# Patient Record
Sex: MALE | Race: WHITE | NOT HISPANIC OR LATINO | Employment: FULL TIME | URBAN - METROPOLITAN AREA
[De-identification: names, ages, dates, MRNs, and addresses within clinical notes are randomized per-mention and may not be internally consistent; named-entity substitution may affect disease eponyms.]

---

## 2017-04-25 ENCOUNTER — TRANSFERRED RECORDS (OUTPATIENT)
Dept: HEALTH INFORMATION MANAGEMENT | Facility: CLINIC | Age: 62
End: 2017-04-25

## 2017-05-30 ENCOUNTER — THERAPY VISIT (OUTPATIENT)
Dept: PHYSICAL THERAPY | Facility: CLINIC | Age: 62
End: 2017-05-30
Payer: COMMERCIAL

## 2017-05-30 DIAGNOSIS — M54.2 CERVICALGIA: Primary | ICD-10-CM

## 2017-05-30 PROCEDURE — 97140 MANUAL THERAPY 1/> REGIONS: CPT | Mod: GP | Performed by: PHYSICAL THERAPIST

## 2017-05-30 PROCEDURE — 97112 NEUROMUSCULAR REEDUCATION: CPT | Mod: GP | Performed by: PHYSICAL THERAPIST

## 2017-05-30 PROCEDURE — 97161 PT EVAL LOW COMPLEX 20 MIN: CPT | Mod: GP | Performed by: PHYSICAL THERAPIST

## 2017-05-30 NOTE — LETTER
[facility] ATHLETIC [facility] PHYSICAL THERAPY  [address]  [facility] [address]  [phone]    May 30, 2017    Re: Rasheed Zimmerman   :   1955  MRN:  [id number]   REFERRING PHYSICIAN:   Alvrao Santos    [facility] ATHLETIC [facility] PHYSICAL [facility]  Date of Initial Evaluation:  17  Visits:  Rxs Used: 1  Reason for Referral:  Cervicalgia    EVALUATION SUMMARY    Subjective:  Patient is a 61 year old male presenting with rehab cervical spine hpi. The history is provided by the patient. No  was used.   Rasheed Zimmerman is a 61 year old male with a cervical spine condition.  Condition occurred with:  Degenerative joint disease.  Condition occurred: for unknown reasons.  This is a chronic condition  Pt c/o neck pain of chronic nature (years) with recent flare.  States ordered PT after x-rays showed DDD 17.   Patient reports pain:  Cervical right side, cervical left side, central cervical spine, upper cervical spine, mid cervical spine and lower cervical spine.  Pain is described as shooting, stabbing and aching and is constant   Associated symptoms:  Headache and loss of motion/stiffness. Pain is the same all the time and relieved by rest, NSAID's and muscle relaxants.  Since onset symptoms are unchanged.  General health as reported by patient is good.  Pertinent medical history includes:  Osteoarthritis.  Medical allergies: no.  Other surgeries include:  Orthopedic surgery (R TKA).  Current medications:  Anti-inflammatory.  Current occupation is .  Primary job tasks include:  Prolonged standing, operating a machine and lifting.    Objective:  Cervical/Thoracic Evaluation  Arom wnl cervical: retraction mod loss +/-, rep NE during, increased ROM after.     AROM Cervical:  Flexion:            Min loss +/-  Extension:       Mod loss +/-  Rotation:         Left: min loss +/-     Right: min loss +/-  Side Bend:       Left: mod loss +/-     Right:  Mod/max loss +/-  Headaches: cervical  Cervical Myotomes:  normal  Cervical Dermatomes:  normal              Rasheed Zimmerman         Cervical Palpation:    Tenderness present at Left:    Upper Trap; Levator; Erector Spinae and Suboccipitals  Tenderness present at Right:    Upper Trap; Levator; Erector Spinae and Suboccipitals  Functional Tests:    Core strength and proprioception:  Fair scap stab-UT substitution        Assessment/Plan:    Patient is a 61 year old male with cervical complaints.    Patient has the following significant findings with corresponding treatment plan.                Diagnosis 1:  Neck pain  Pain -  hot/cold therapy, manual therapy, directional preference exercise and home program  Decreased ROM/flexibility - manual therapy and therapeutic exercise  Decreased joint mobility - manual therapy and therapeutic exercise  Decreased strength - therapeutic exercise and therapeutic activities  Impaired muscle performance - neuro re-education  Decreased function - therapeutic activities  Impaired posture - neuro re-education    Therapy Evaluation Codes:   1) History comprised of:   Personal factors that impact the plan of care:      None.    Comorbidity factors that impact the plan of care are:      Asthma and Osteoarthritis.     Medications impacting care: Anti-inflammatory.  2) Examination of Body Systems comprised of:   Body structures and functions that impact the plan of care:      Cervical spine.   Activity limitations that impact the plan of care are:      Driving, Lifting, Reading/Computer work, Working and Sleeping.  3) Clinical presentation characteristics are:   Stable/Uncomplicated.  4) Decision-Making    Low complexity using standardized patient assessment instrument and/or measureable assessment of functional outcome.  Cumulative Therapy Evaluation is: Low complexity.  Previous and current functional limitations:  (See Goal Flow Sheet for this  information)    Short term and Long term goals: (See Goal Flow Sheet for this information)   Communication ability:  Patient appears to be able to clearly communicate and understand verbal and written communication and follow directions correctly.  Treatment Explanation - The following has been discussed with the patient:   RX ordered/plan of care  Anticipated outcomes  Possible risks and side effects      Rasheed Zimmerman         This patient would benefit from PT intervention to resume normal activities.   Rehab potential is excellent.  Frequency:  1 X week, once daily  Duration:  for 8 weeks  Discharge Plan:  Achieve all LTG.  Independent in home treatment program.  Reach maximal therapeutic benefit.    Please refer to the daily flowsheet for treatment today, total treatment time and time spent performing 1:1 timed codes.     Thank you for your referral.    INQUIRIES  Therapist: Liberty Kirby, PT  [facility] PHYSICAL THERAP[facility]  [address] [place] [address]  Phone: [phone]  Fax: [phone]

## 2017-05-30 NOTE — PROGRESS NOTES
Subjective:    Patient is a 61 year old male presenting with rehab left ankle/foot hpi.                                      Pertinent medical history includes:  Osteoarthritis and asthma.    Other surgeries include:  Orthopedic surgery.  Current medications:  Other.  Current occupation is .    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                                Objective:    System    Physical Exam    General     ROS    Assessment/Plan:

## 2017-05-30 NOTE — PROGRESS NOTES
Subjective:    Patient is a 61 year old male presenting with rehab cervical spine hpi. The history is provided by the patient. No  was used.   Rasheed Zimmerman is a 61 year old male with a cervical spine condition.  Condition occurred with:  Degenerative joint disease.  Condition occurred: for unknown reasons.  This is a chronic condition  Pt c/o neck pain of chronic nature (years) with recent flare.  States ordered PT after x-rays showed DDD 4/25/17..    Patient reports pain:  Cervical right side, cervical left side, central cervical spine, upper cervical spine, mid cervical spine and lower cervical spine.    Pain is described as shooting, stabbing and aching and is constant   Associated symptoms:  Headache and loss of motion/stiffness. Pain is the same all the time.   and relieved by rest, NSAID's and muscle relaxants.  Since onset symptoms are unchanged.        General health as reported by patient is good.  Pertinent medical history includes:  Osteoarthritis.  Medical allergies: no.  Other surgeries include:  Orthopedic surgery (R TKA).  Current medications:  Anti-inflammatory.  Current occupation is   .    Primary job tasks include:  Prolonged standing, operating a machine and lifting.                                Objective:    System              Cervical/Thoracic Evaluation  Arom wnl cervical: retraction mod loss +/-, rep NE during, increased ROM after.     AROM:  AROM Cervical:    Flexion:            Min loss +/-  Extension:       Mod loss +/-  Rotation:         Left: min loss +/-     Right: min loss +/-  Side Bend:      Left: mod loss +/-     Right:  Mod/max loss +/-      Headaches: cervical  Cervical Myotomes:  normal                      Cervical Dermatomes:  normal                    Cervical Palpation:    Tenderness present at Left:    Upper Trap; Levator; Erector Spinae and Suboccipitals  Tenderness present at Right:    Upper Trap; Levator; Erector Spinae and  Suboccipitals  Functional Tests:    Core strength and proprioception:  Fair scap stab-UT substitution                                                General     ROS    Assessment/Plan:      Patient is a 61 year old male with cervical complaints.    Patient has the following significant findings with corresponding treatment plan.                Diagnosis 1:  Neck pain  Pain -  hot/cold therapy, manual therapy, directional preference exercise and home program  Decreased ROM/flexibility - manual therapy and therapeutic exercise  Decreased joint mobility - manual therapy and therapeutic exercise  Decreased strength - therapeutic exercise and therapeutic activities  Impaired muscle performance - neuro re-education  Decreased function - therapeutic activities  Impaired posture - neuro re-education    Therapy Evaluation Codes:   1) History comprised of:   Personal factors that impact the plan of care:      None.    Comorbidity factors that impact the plan of care are:      Asthma and Osteoarthritis.     Medications impacting care: Anti-inflammatory.  2) Examination of Body Systems comprised of:   Body structures and functions that impact the plan of care:      Cervical spine.   Activity limitations that impact the plan of care are:      Driving, Lifting, Reading/Computer work, Working and Sleeping.  3) Clinical presentation characteristics are:   Stable/Uncomplicated.  4) Decision-Making    Low complexity using standardized patient assessment instrument and/or measureable assessment of functional outcome.  Cumulative Therapy Evaluation is: Low complexity.    Previous and current functional limitations:  (See Goal Flow Sheet for this information)    Short term and Long term goals: (See Goal Flow Sheet for this information)     Communication ability:  Patient appears to be able to clearly communicate and understand verbal and written communication and follow directions correctly.  Treatment Explanation - The following has  been discussed with the patient:   RX ordered/plan of care  Anticipated outcomes  Possible risks and side effects  This patient would benefit from PT intervention to resume normal activities.   Rehab potential is excellent.    Frequency:  1 X week, once daily  Duration:  for 8 weeks  Discharge Plan:  Achieve all LTG.  Independent in home treatment program.  Reach maximal therapeutic benefit.    Please refer to the daily flowsheet for treatment today, total treatment time and time spent performing 1:1 timed codes.

## 2017-05-30 NOTE — MR AVS SNAPSHOT
After Visit Summary   5/30/2017    Rasheed Zimmerman    MRN: [id number]           Patient Information     Date Of Birth          1955        Visit Information        Provider Department      5/30/2017 3:00 PM Leif Kirby, PT [facility] Physical Therapy        Today's Diagnoses     Cervicalgia    -  1       Follow-ups after your visit        Your next 10 appointments already scheduled     Jun 05, 2017  4:10 PM CDT   NATHAN Spine with Leif Kirby PT   [facility] Physical Therapy ([facility])    [address]  [facility] [address]   [phone]            Jun 13, 2017  3:00 PM CDT   NATHAN Spine with Leif Kirby PT   [facility] Physical Therapy ([facility])    [address]  [facility] [address]   [phone]            Jun 19, 2017  3:40 PM CDT   NATHAN Spine with Alvaro Blackwell PT   [facility] Physical Therapy ([facility])    [address]  [facility] [address]   [phone]            Jun 26, 2017  3:30 PM CDT   NATHAN Spine with Leif Kirby PT   [facility] Physical Therapy ([facility])    [address]  [facility] [address]   [phone]              Who to contact     If you have questions or need follow up information about today's clinic visit or your schedule please contact [facility] ATHLETIC [facility] PHYSICAL THERAPY directly at [phone].  Normal or non-critical lab and imaging results will be communicated to you by MyChart, letter or phone within 4 business days after the clinic has received the results. If you do not hear from us within 7 days, please contact the clinic through MyChart or phone. If you have a critical or abnormal lab result, we will notify you by phone as soon as  possible.  Submit refill requests through [employer] or call your pharmacy and they will forward the refill request to us. Please allow 3 business days for your refill to be completed.          Additional Information About Your Visit        [employer] Information     [employer] gives you secure access to your electronic health record. If you see a primary care provider, you can also send messages to your care team and make appointments. If you have questions, please call your primary care clinic.  If you do not have a primary care provider, please call [phone] and they will assist you.        Care EveryWhere ID     This is your Care EveryWhere ID. This could be used by other organizations to access your [facility] medical records  [id number]         Blood Pressure from Last 3 Encounters:   08/03/16 124/84   05/06/16 138/86   01/30/16 120/80    Weight from Last 3 Encounters:   08/03/16 102.5 kg (226 lb)   05/06/16 98.4 kg (217 lb)   01/30/16 99.8 kg (220 lb)              We Performed the Following     HC PT EVAL, LOW COMPLEXITY     NATHAN INITIAL EVAL REPORT     MANUAL THER TECH,1+REGIONS,EA 15 MIN     NEUROMUSCULAR RE-EDUCATION        Primary Care Provider Office Phone # Fax #    Alvaro Fisher [phone]       [address] [address]        Thank you!     Thank you for choosing [facility] PHYSICAL THERAPY  for your care. Our goal is always to provide you with excellent care. Hearing back from our patients is one way we can continue to improve our services. Please take a few minutes to complete the written survey that you may receive in the mail after your visit with us. Thank you!             Your Updated Medication List - Protect others around you: Learn how to safely use, store and throw away your medicines at www.disposemymeds.org.          This list is accurate as of: 5/30/17  3:49 PM.  Always use your most  recent med list.                   Brand Name Dispense Instructions for use    * albuterol 108 (90 BASE) MCG/ACT Inhaler    PROAIR HFA/PROVENTIL HFA/VENTOLIN HFA     Inhale 2 puffs into the lungs every 6 hours       * albuterol (2.5 MG/3ML) 0.083% neb solution     1 vial    Take 1 vial (2.5 mg) by nebulization every 6 hours as needed for shortness of breath / dyspnea or wheezing Take one vial (2.5mg) by Nebulization once for 1 dose -       BREO ELLIPTA 200-25 MCG/INH oral inhaler   Generic drug:  fluticasone-vilanterol          doxycycline 100 MG capsule    VIBRAMYCIN    20 capsule    Take 1 capsule (100 mg) by mouth 2 times daily       flurbiprofen 100 MG tablet    ANSAID         IBU-200 PO          MONOKET PO          NASONEX 50 MCG/ACT spray   Generic drug:  mometasone     1    1 puff each nostril twice daily       simvastatin 20 MG tablet    ZOCOR    90 tablet    Take 1 tablet (20 mg) by mouth At Bedtime Due for fasting physical 08/2016       SINGULAIR 10 MG tablet   Generic drug:  montelukast     90    ONE TABLET DAILY       VITAMIN D PO      as directed       * Notice:  This list has 2 medication(s) that are the same as other medications prescribed for you. Read the directions carefully, and ask your doctor or other care provider to review them with you.

## 2017-06-15 ENCOUNTER — THERAPY VISIT (OUTPATIENT)
Dept: PHYSICAL THERAPY | Facility: CLINIC | Age: 62
End: 2017-06-15
Payer: COMMERCIAL

## 2017-06-15 DIAGNOSIS — M54.2 CERVICALGIA: ICD-10-CM

## 2017-06-15 PROCEDURE — 97110 THERAPEUTIC EXERCISES: CPT | Mod: GP | Performed by: PHYSICAL THERAPIST

## 2017-06-15 PROCEDURE — 97112 NEUROMUSCULAR REEDUCATION: CPT | Mod: GP | Performed by: PHYSICAL THERAPIST

## 2017-06-15 PROCEDURE — 97140 MANUAL THERAPY 1/> REGIONS: CPT | Mod: GP | Performed by: PHYSICAL THERAPIST

## 2017-06-22 ENCOUNTER — OFFICE VISIT (OUTPATIENT)
Dept: FAMILY MEDICINE | Facility: CLINIC | Age: 62
End: 2017-06-22
Payer: COMMERCIAL

## 2017-06-22 VITALS
SYSTOLIC BLOOD PRESSURE: 124 MMHG | BODY MASS INDEX: 31.78 KG/M2 | HEART RATE: 76 BPM | HEIGHT: 70 IN | DIASTOLIC BLOOD PRESSURE: 82 MMHG | TEMPERATURE: 98 F | RESPIRATION RATE: 14 BRPM | WEIGHT: 222 LBS | OXYGEN SATURATION: 96 %

## 2017-06-22 DIAGNOSIS — J20.9 ACUTE BRONCHITIS, UNSPECIFIED ORGANISM: Primary | ICD-10-CM

## 2017-06-22 LAB
DEPRECATED S PYO AG THROAT QL EIA: NORMAL
MICRO REPORT STATUS: NORMAL
SPECIMEN SOURCE: NORMAL

## 2017-06-22 PROCEDURE — 87081 CULTURE SCREEN ONLY: CPT | Performed by: FAMILY MEDICINE

## 2017-06-22 PROCEDURE — 99213 OFFICE O/P EST LOW 20 MIN: CPT | Performed by: FAMILY MEDICINE

## 2017-06-22 PROCEDURE — 87880 STREP A ASSAY W/OPTIC: CPT | Performed by: FAMILY MEDICINE

## 2017-06-22 RX ORDER — AZITHROMYCIN 250 MG/1
TABLET, FILM COATED ORAL
Qty: 6 TABLET | Refills: 0 | Status: SHIPPED | OUTPATIENT
Start: 2017-06-22 | End: 2017-11-13

## 2017-06-22 RX ORDER — CELECOXIB 200 MG/1
CAPSULE ORAL
Refills: 3 | COMMUNITY
Start: 2017-06-05 | End: 2021-06-18

## 2017-06-22 RX ORDER — PREDNISONE 20 MG/1
40 TABLET ORAL DAILY
Qty: 10 TABLET | Refills: 0 | Status: SHIPPED | OUTPATIENT
Start: 2017-06-22 | End: 2017-06-27

## 2017-06-22 NOTE — MR AVS SNAPSHOT
After Visit Summary   6/22/2017    Rasheed Zimmerman    MRN: [id number]           Patient Information     Date Of Birth          1955        Visit Information        Provider Department      6/22/2017 3:40 PM Siobhan Tapia, DO [facility]        Today's Diagnoses     Acute bronchitis, unspecified organism    -  1       Follow-ups after your visit        Your next 10 appointments already scheduled     Jun 29, 2017  3:40 PM CDT   NATHAN Spine with Leif Kirby, PT   [facility]    [address]  [facility] [id number] [phone]              Who to contact     If you have questions or need follow up information about today's clinic visit or your schedule please contact [facility] directly at [phone].  Normal or non-critical lab and imaging results will be communicated to you by MyChart, letter or phone within 4 business days after the clinic has received the results. If you do not hear from us within 7 days, please contact the clinic through MyChart or phone. If you have a critical or abnormal lab result, we will notify you by phone as soon as possible.  Submit refill requests through [employer] or call your pharmacy and they will forward the refill request to us. Please allow 3 business days for your refill to be completed.          Additional Information About Your Visit        MyChart Information     [employer] gives you secure access to your electronic health record. If you see a primary care provider, you can also send messages to your care team and make appointments. If you have questions, please call your primary care clinic.  If you do not have a primary care provider, please call [phone] and they will assist you.        Care EveryWhere ID     This is your Care EveryWhere ID. This could be used by other organizations to access your [facility]  "records  [id number]        Your Vitals Were     Pulse Temperature Respirations Height Pulse Oximetry BMI (Body Mass Index)    76 98  F (36.7  C) (Oral) 14 5' 10\" (1.778 m) 96% 31.85 kg/m2       Blood Pressure from Last 3 Encounters:   06/22/17 124/82   08/03/16 124/84   05/06/16 138/86    Weight from Last 3 Encounters:   06/22/17 222 lb (100.7 kg)   08/03/16 226 lb (102.5 kg)   05/06/16 217 lb (98.4 kg)              We Performed the Following     Beta strep group A culture     Strep, Rapid Screen          Today's Medication Changes          These changes are accurate as of: 6/22/17  4:10 PM.  If you have any questions, ask your nurse or doctor.               Start taking these medicines.        Dose/Directions    azithromycin 250 MG tablet   Commonly known as:  ZITHROMAX   Used for:  Acute bronchitis, unspecified organism   Started by:  Siobhan Tapia DO        Two tablets first day, then one tablet daily for four days.   Quantity:  6 tablet   Refills:  0       predniSONE 20 MG tablet   Commonly known as:  DELTASONE   Used for:  Acute bronchitis, unspecified organism   Started by:  Siobhan Tapia DO        Dose:  40 mg   Take 2 tablets (40 mg) by mouth daily for 5 days   Quantity:  10 tablet   Refills:  0         Stop taking these medicines if you haven't already. Please contact your care team if you have questions.     flurbiprofen 100 MG tablet   Commonly known as:  ANSAID   Stopped by:  Siobhan Tapia DO                Where to get your medicines      These medications were sent to [facility] [address] [place] [address] [address] AT [facility]  [address] [facility] [address]     Phone:  [phone]     azithromycin 250 MG tablet    predniSONE 20 MG tablet                Primary Care Provider Office Phone # Fax #    Avlaro Fisher [phone]       [address] [address]        Equal " Access to Services     [facility]: Hadii enedina marquez les Sainzali, waaxda luqadaha, qaybta kaalilia emyshiraeric, waxanisha esteban graciajosephwm davis. So [facility] [phone].    ATENCIÓN: Si anisala tricia, tiene a dickey disposición servicios gratuitos de asistencia lingüística. Llame al [phone].    We comply with applicable federal civil rights laws and [place] laws. We do not discriminate on the basis of race, color, national origin, age, disability sex, sexual orientation or gender identity.            Thank you!     Thank you for choosing [facility]  for your care. Our goal is always to provide you with excellent care. Hearing back from our patients is one way we can continue to improve our services. Please take a few minutes to complete the written survey that you may receive in the mail after your visit with us. Thank you!             Your Updated Medication List - Protect others around you: Learn how to safely use, store and throw away your medicines at www.disposemymeds.org.          This list is accurate as of: 6/22/17  4:10 PM.  Always use your most recent med list.                   Brand Name Dispense Instructions for use Diagnosis    * albuterol 108 (90 BASE) MCG/ACT Inhaler    PROAIR HFA/PROVENTIL HFA/VENTOLIN HFA     Inhale 2 puffs into the lungs every 6 hours        * albuterol (2.5 MG/3ML) 0.083% neb solution     1 vial    Take 1 vial (2.5 mg) by nebulization every 6 hours as needed for shortness of breath / dyspnea or wheezing Take one vial (2.5mg) by Nebulization once for 1 dose -    Asthmatic bronchitis, mild intermittent, uncomplicated, Pneumonitis       azithromycin 250 MG tablet    ZITHROMAX    6 tablet    Two tablets first day, then one tablet daily for four days.    Acute bronchitis, unspecified organism       BREO ELLIPTA 200-25 MCG/INH oral inhaler   Generic drug:  fluticasone-vilanterol       Asthmatic bronchitis, mild intermittent, uncomplicated, Pneumonitis        celecoxib 200 MG capsule    celeBREX     TK 1 C PO QD PRN        doxycycline 100 MG capsule    VIBRAMYCIN    20 capsule    Take 1 capsule (100 mg) by mouth 2 times daily    Acute recurrent maxillary sinusitis       IBU-200 PO       Asthmatic bronchitis, mild intermittent, uncomplicated, Pneumonitis       MONOKET PO           NASONEX 50 MCG/ACT spray   Generic drug:  mometasone     1    1 puff each nostril twice daily    Acute conjunctivitis, unspecified       predniSONE 20 MG tablet    DELTASONE    10 tablet    Take 2 tablets (40 mg) by mouth daily for 5 days    Acute bronchitis, unspecified organism       simvastatin 20 MG tablet    ZOCOR    90 tablet    Take 1 tablet (20 mg) by mouth At Bedtime Due for fasting physical 08/2016    Hyperlipidemia LDL goal <130       SINGULAIR 10 MG tablet   Generic drug:  montelukast     90    ONE TABLET DAILY        VITAMIN D PO      as directed        * Notice:  This list has 2 medication(s) that are the same as other medications prescribed for you. Read the directions carefully, and ask your doctor or other care provider to review them with you.

## 2017-06-22 NOTE — PROGRESS NOTES
SUBJECTIVE:                                                    Rasheed Zimmerman is a 61 year old male who presents to clinic today for the following health issues:      RESPIRATORY SYMPTOMS      Duration: 6 days    Description  nasal congestion, rhinorrhea, sore throat, cough, wheezing, fever and ear pain, a little    Severity: mild    Accompanying signs and symptoms: None    History (predisposing factors):  asthma    Precipitating or alleviating factors: None    Therapies tried and outcome:  rest and fluids and Ibuprofen did not help much    Fever of 101 on Sunday.           Problem list and histories reviewed & adjusted, as indicated.  Additional history: as documented    Patient Active Problem List   Diagnosis     Sinusitis, chronic     Thoracic or lumbosacral neuritis or radiculitis, unspecified     Disease of lung     Actinic keratosis     HYPERLIPIDEMIA LDL GOAL <130     Advanced directives, counseling/discussion     Moderate persistent asthma     Microscopic hematuria     Calculus of kidney     Cervicalgia     Past Surgical History:   Procedure Laterality Date     FASCIOTOMY LOWER EXTREMITY      right leg     PHACOEMULSIFICATION CLEAR CORNEA WITH STANDARD INTRAOCULAR LENS IMPLANT  8/23/2012    Procedure: PHACOEMULSIFICATION CLEAR CORNEA WITH STANDARD INTRAOCULAR LENS IMPLANT;  LEFT PHACOEMULSIFICATION CLEAR CORNEA WITH STANDARD INTRAOCULAR LENS IMPLANT ;  Surgeon: Miko Rodríguez MD;  Location: [facility]     PHACOEMULSIFICATION CLEAR CORNEA WITH STANDARD INTRAOCULAR LENS IMPLANT  11/15/2012    Procedure: PHACOEMULSIFICATION CLEAR CORNEA WITH STANDARD INTRAOCULAR LENS IMPLANT;  RIGHT PHACOEMULSIFICATION CLEAR CORNEA WITH STANDARD INTRAOCULAR LENS IMPLANT ;  Surgeon: Miko Rodríguez MD;  Location: [facility]     total knee         Social History   Substance Use Topics     Smoking status: Never Smoker     Smokeless tobacco: Never Used     Alcohol use No     Family History   Problem Relation Age of Onset  "    C.A.SWAPNA. Paternal Grandfather       86 YOA, [place]     DIABETES Paternal Grandfather      Neurologic Disorder Paternal Grandfather      zenobia gehrig's disease,  at 64 YOA     Alzheimer Disease Paternal Grandmother      alive at 90     Asthma Paternal Grandmother      Asthma Father            Reviewed and updated as needed this visit by clinical staff       Reviewed and updated as needed this visit by Provider         ROS:  Constitutional, HEENT, cardiovascular, pulmonary, gi and gu systems are negative, except as otherwise noted.    OBJECTIVE:                                                    /82 (BP Location: Right arm, Patient Position: Chair, Cuff Size: Adult Large)  Pulse 76  Temp 98  F (36.7  C) (Oral)  Resp 14  Ht 5' 10\" (1.778 m)  Wt 222 lb (100.7 kg)  SpO2 96%  BMI 31.85 kg/m2  Body mass index is 31.85 kg/(m^2).  GENERAL: healthy, alert and no distress  EYES: Eyes grossly normal to inspection, PERRL and conjunctivae and sclerae normal  HENT: ear canals and TM's normal, nose and mouth without ulcers or lesions  NECK: bilateral anterior cervical adenopathy  RESP: Diffuse mild exp wheeze.  Mild crackles in RLL.  Decreased breath sounds.    CV: regular rates and rhythm, normal S1 S2, no S3 or S4 and no murmur, click or rub    Results for orders placed or performed in visit on 17   Strep, Rapid Screen   Result Value Ref Range    Specimen Description Throat     Rapid Strep A Screen       NEGATIVE: No Group A streptococcal antigen detected by immunoassay, await   culture report.      Micro Report Status Pending         ASSESSMENT/PLAN:                                                      1. Acute bronchitis, unspecified organism  - azithromycin (ZITHROMAX) 250 MG tablet; Two tablets first day, then one tablet daily for four days.  Dispense: 6 tablet; Refill: 0  - predniSONE (DELTASONE) 20 MG tablet; Take 2 tablets (40 mg) by mouth daily for 5 days  Dispense: 10 tablet; Refill: 0  - " Beta strep group A culture    Follow up if symptoms are worsening or not improving    Siobhan Tapia, DO  [facility]

## 2017-06-23 LAB
BACTERIA SPEC CULT: NORMAL
MICRO REPORT STATUS: NORMAL
SPECIMEN SOURCE: NORMAL

## 2017-06-23 ASSESSMENT — ASTHMA QUESTIONNAIRES: ACT_TOTALSCORE: 21

## 2017-06-29 ENCOUNTER — THERAPY VISIT (OUTPATIENT)
Dept: PHYSICAL THERAPY | Facility: CLINIC | Age: 62
End: 2017-06-29
Payer: COMMERCIAL

## 2017-06-29 DIAGNOSIS — M54.2 CERVICALGIA: ICD-10-CM

## 2017-06-29 PROCEDURE — 97112 NEUROMUSCULAR REEDUCATION: CPT | Mod: GP | Performed by: PHYSICAL THERAPIST

## 2017-06-29 PROCEDURE — 97140 MANUAL THERAPY 1/> REGIONS: CPT | Mod: GP | Performed by: PHYSICAL THERAPIST

## 2017-06-29 PROCEDURE — 97110 THERAPEUTIC EXERCISES: CPT | Mod: GP | Performed by: PHYSICAL THERAPIST

## 2017-06-29 NOTE — MR AVS SNAPSHOT
After Visit Summary   6/29/2017    Rasheed Zimmerman    MRN: [id number]           Patient Information     Date Of Birth          1955        Visit Information        Provider Department      6/29/2017 3:40 PM Leif Kirby, PT [facility] Physical Therapy        Today's Diagnoses     Cervicalgia           Follow-ups after your visit        Your next 10 appointments already scheduled     Jul 14, 2017  3:00 PM CDT   [facility] with Leif Kirby PT   [facility] Physical Therapy ([facility])    [address]  [facility] [id number] [phone]              Who to contact     If you have questions or need follow up information about today's clinic visit or your schedule please contact [facility] directly at [phone].  Normal or non-critical lab and imaging results will be communicated to you by [employer], letter or phone within 4 business days after the clinic has received the results. If you do not hear from us within 7 days, please contact the clinic through [employer] or phone. If you have a critical or abnormal lab result, we will notify you by phone as soon as possible.  Submit refill requests through [employer] or call your pharmacy and they will forward the refill request to us. Please allow 3 business days for your refill to be completed.          Additional Information About Your Visit        MyChart Information     [employer] gives you secure access to your electronic health record. If you see a primary care provider, you can also send messages to your care team and make appointments. If you have questions, please call your primary care clinic.  If you do not have a primary care provider, please call [phone] and they will assist you.        Care EveryWhere ID     This is your Care EveryWhere ID. This could be used by other organizations to  access your [place] medical records  [id number]         Blood Pressure from Last 3 Encounters:   06/22/17 124/82   08/03/16 124/84   05/06/16 138/86    Weight from Last 3 Encounters:   06/22/17 100.7 kg (222 lb)   08/03/16 102.5 kg (226 lb)   05/06/16 98.4 kg (217 lb)              We Performed the Following     MANUAL THER TECH,1+REGIONS,EA 15 MIN     NEUROMUSCULAR RE-EDUCATION     THERAPEUTIC EXERCISES        Primary Care Provider Office Phone # Fax #    Alvaro Fisher [phone]       [facility] [id number] [place] [address]        Equal Access to Services     WILLIAM FRANK : Hadii enedina logano Sojolie, waaxda luqadaha, qaybta kaalmada ademichaelyaeric, cruzito davis. So [facility] [phone].    ATENCIÓN: Si habla español, tiene a dickey disposición servicios gratuitos de asistencia lingüística. Llame al [phone].    We comply with applicable federal civil rights laws and [place] laws. We do not discriminate on the basis of race, color, national origin, age, disability sex, sexual orientation or gender identity.            Thank you!     Thank you for choosing [facility]  for your care. Our goal is always to provide you with excellent care. Hearing back from our patients is one way we can continue to improve our services. Please take a few minutes to complete the written survey that you may receive in the mail after your visit with us. Thank you!             Your Updated Medication List - Protect others around you: Learn how to safely use, store and throw away your medicines at www.disposemymeds.org.          This list is accurate as of: 6/29/17  4:16 PM.  Always use your most recent med list.                   Brand Name Dispense Instructions for use Diagnosis    * albuterol 108 (90 BASE) MCG/ACT Inhaler    PROAIR HFA/PROVENTIL HFA/VENTOLIN HFA     Inhale 2 puffs into the lungs every  6 hours        * albuterol (2.5 MG/3ML) 0.083% neb solution     1 vial    Take 1 vial (2.5 mg) by nebulization every 6 hours as needed for shortness of breath / dyspnea or wheezing Take one vial (2.5mg) by Nebulization once for 1 dose -    Asthmatic bronchitis, mild intermittent, uncomplicated, Pneumonitis       azithromycin 250 MG tablet    ZITHROMAX    6 tablet    Two tablets first day, then one tablet daily for four days.    Acute bronchitis, unspecified organism       BREO ELLIPTA 200-25 MCG/INH oral inhaler   Generic drug:  fluticasone-vilanterol       Asthmatic bronchitis, mild intermittent, uncomplicated, Pneumonitis       celecoxib 200 MG capsule    celeBREX     TK 1 C PO QD PRN        doxycycline 100 MG capsule    VIBRAMYCIN    20 capsule    Take 1 capsule (100 mg) by mouth 2 times daily    Acute recurrent maxillary sinusitis       IBU-200 PO       Asthmatic bronchitis, mild intermittent, uncomplicated, Pneumonitis       MONOKET PO           NASONEX 50 MCG/ACT spray   Generic drug:  mometasone     1    1 puff each nostril twice daily    Acute conjunctivitis, unspecified       simvastatin 20 MG tablet    ZOCOR    90 tablet    Take 1 tablet (20 mg) by mouth At Bedtime Due for fasting physical 08/2016    Hyperlipidemia LDL goal <130       SINGULAIR 10 MG tablet   Generic drug:  montelukast     90    ONE TABLET DAILY        VITAMIN D PO      as directed        * Notice:  This list has 2 medication(s) that are the same as other medications prescribed for you. Read the directions carefully, and ask your doctor or other care provider to review them with you.

## 2017-07-28 NOTE — PROGRESS NOTES
Discharge Note    Progress reporting period is from initial eval to Jun 29, 2017.     Rasheed failed to return for next follow up visit and current status is unknown.  Please see information below for last relevant information on current status.  Patient seen for Rxs Used: 3 visits.  SUBJECTIVE  Subjective changes noted by patient:  Subjective: doing well, some soreness due to URI  .  Current pain level is Current Pain level: 4/10.     Previous pain level was  Initial Pain level: 6/10.   Changes in function:  Yes (See Goal flowsheet attached for changes in current functional level)  Adverse reaction to treatment or activity: None    OBJECTIVE  Changes noted in objective findings: Objective: hypertonicity sub occip, hypomobile C 2-6     ASSESSMENT/PLAN  Diagnosis: Neck pain   DIAGP:  The encounter diagnosis was Cervicalgia.  Updated problem list and treatment plan:   Pain - HEP  Decreased ROM/flexibility - HEP  Decreased function - HEP  Decreased strength - HEP  Impaired muscle performance - HEP  Impaired posture - HEP  STG/LTGs have been met or progress has been made towards goals:  Yes, please see goal flowsheet for most current information  Assessment of Progress: current status is unknown.  Last current status: Assessment of progress: Pt is progressing as expected   Self Management Plans:  HEP  I have re-evaluated this patient and find that the nature, scope, duration and intensity of the therapy is appropriate for the medical condition of the patient.  Rasheed continues to require the following intervention to meet STG and LTG's:  HEP.    Recommendations:  Discharge with current home program.  Patient to follow up with MD as needed.    Please refer to the daily flowsheet for treatment today, total treatment time and time spent performing 1:1 timed codes.

## 2017-09-08 DIAGNOSIS — E78.5 HYPERLIPIDEMIA LDL GOAL <130: ICD-10-CM

## 2017-09-08 RX ORDER — SIMVASTATIN 20 MG
20 TABLET ORAL AT BEDTIME
Qty: 30 TABLET | Refills: 0 | Status: SHIPPED | OUTPATIENT
Start: 2017-09-08 | End: 2017-11-05

## 2017-09-08 NOTE — TELEPHONE ENCOUNTER
Medication is being filled for 1 time refill only due to:  Patient needs labs lipids, Letter sent to schedule physical and labs. Patient needs to be seen because it has been more than one year since last visit.     Adrienne Moore RN, BS  Clinical Nurse Triage.

## 2017-09-08 NOTE — TELEPHONE ENCOUNTER
simvastatin (ZOCOR) 20 MG tablet     Last Written Prescription Date: 9/12/16  Last Fill Quantity: 90, # refills: 3  Last Office Visit with G, P or [facility] prescribing provider: 6/22/2017       Lab Results   Component Value Date    CHOL 171 08/01/2016     Lab Results   Component Value Date    HDL 49 08/01/2016     Lab Results   Component Value Date    LDL 97 08/01/2016     Lab Results   Component Value Date    TRIG 125 08/01/2016     Lab Results   Component Value Date    CHOLHDLRATIO 3.6 08/10/2015     OCLLETTE Peres  September 8, 2017  9:51 AM

## 2017-09-08 NOTE — LETTER
[address] [address]  Phone: [phone]    09/08/17    Rasheed Zimmerman  [address] [place] [address]      Dear Rasheed:     We recently received a call from your pharmacy requesting a refill of your medication.    A review of your chart indicates that an appointment is required with your provider for physical and fasting labs. Please call the clinic at [phone] to schedule your appointment.    We have authorized one refill of your medication to allow time for you to schedule your appointment.    Taking care of your health is important to us, and ongoing visits with your provider are vital to your care.  We look forward to seeing you in the near future.          Sincerely,      Alvaro Fisher MD/ravindra

## 2017-11-05 DIAGNOSIS — E78.5 HYPERLIPIDEMIA LDL GOAL <130: ICD-10-CM

## 2017-11-08 RX ORDER — SIMVASTATIN 20 MG
TABLET ORAL
Status: SHIPPED
Start: 2017-11-08 | End: 2021-02-25

## 2017-11-08 NOTE — TELEPHONE ENCOUNTER
Recent Labs   Lab Test  08/01/16   1458  08/10/15   1608  07/14/14   1522   CHOL  171  149  169   HDL  49  41  47   LDL  97  82  103   TRIG  125  129  96   CHOLHDLRATIO   --   3.6  3.6     Lab Results   Component Value Date    AST 20 08/01/2016     Lab Results   Component Value Date    ALT 30 08/01/2016     Due for annual labs, letter sent, pharmacy message sent, no appointment made, see pharmacy message sent    Yissel Giordano RN, BSN  Message handled by Nurse Triage.

## 2017-11-13 ENCOUNTER — OFFICE VISIT (OUTPATIENT)
Dept: FAMILY MEDICINE | Facility: CLINIC | Age: 62
End: 2017-11-13
Payer: COMMERCIAL

## 2017-11-13 VITALS
SYSTOLIC BLOOD PRESSURE: 132 MMHG | HEART RATE: 76 BPM | RESPIRATION RATE: 14 BRPM | OXYGEN SATURATION: 96 % | BODY MASS INDEX: 31.07 KG/M2 | DIASTOLIC BLOOD PRESSURE: 89 MMHG | TEMPERATURE: 97.9 F | HEIGHT: 70 IN | WEIGHT: 217 LBS

## 2017-11-13 DIAGNOSIS — Z12.5 SCREENING FOR PROSTATE CANCER: ICD-10-CM

## 2017-11-13 DIAGNOSIS — Z23 NEED FOR PROPHYLACTIC VACCINATION AND INOCULATION AGAINST INFLUENZA: ICD-10-CM

## 2017-11-13 DIAGNOSIS — Z11.59 ENCOUNTER FOR HEPATITIS C SCREENING TEST FOR LOW RISK PATIENT: ICD-10-CM

## 2017-11-13 DIAGNOSIS — Z00.00 ROUTINE GENERAL MEDICAL EXAMINATION AT A HEALTH CARE FACILITY: ICD-10-CM

## 2017-11-13 DIAGNOSIS — J45.20 MILD INTERMITTENT ASTHMA WITHOUT COMPLICATION: Primary | ICD-10-CM

## 2017-11-13 PROCEDURE — 36415 COLL VENOUS BLD VENIPUNCTURE: CPT | Performed by: FAMILY MEDICINE

## 2017-11-13 PROCEDURE — G0103 PSA SCREENING: HCPCS | Performed by: FAMILY MEDICINE

## 2017-11-13 PROCEDURE — 86803 HEPATITIS C AB TEST: CPT | Performed by: FAMILY MEDICINE

## 2017-11-13 PROCEDURE — 99396 PREV VISIT EST AGE 40-64: CPT | Mod: 25 | Performed by: FAMILY MEDICINE

## 2017-11-13 PROCEDURE — 80053 COMPREHEN METABOLIC PANEL: CPT | Performed by: FAMILY MEDICINE

## 2017-11-13 PROCEDURE — 80061 LIPID PANEL: CPT | Performed by: FAMILY MEDICINE

## 2017-11-13 PROCEDURE — 90686 IIV4 VACC NO PRSV 0.5 ML IM: CPT | Performed by: FAMILY MEDICINE

## 2017-11-13 PROCEDURE — 90471 IMMUNIZATION ADMIN: CPT | Performed by: FAMILY MEDICINE

## 2017-11-13 NOTE — PROGRESS NOTES
SUBJECTIVE:   CC: Rasheed Zimmerman is an 61 year old male who presents for preventative health visit.     Physical   Annual:     Getting at least 3 servings of Calcium per day::  NO    Bi-annual eye exam::  Yes    Dental care twice a year::  Yes    Sleep apnea or symptoms of sleep apnea::  None    Diet::  Other    Frequency of exercise::  2-3 days/week    Duration of exercise::  15-30 minutes    Taking medications regularly::  Yes    Medication side effects::  None    Additional concerns today::  No                Today's PHQ-2 Score: PHQ-2 ( 1999 [employer]) 11/13/2017   Q1: Little interest or pleasure in doing things 0   Q2: Feeling down, depressed or hopeless 0   PHQ-2 Score 0   Q1: Little interest or pleasure in doing things Not at all   Q2: Feeling down, depressed or hopeless Not at all   PHQ-2 Score 0       Abuse: Current or Past(Physical, Sexual or Emotional)- No  Do you feel safe in your environment - YES    Social History   Substance Use Topics     Smoking status: Never Smoker     Smokeless tobacco: Never Used     Alcohol use No     The patient does not drink >3 drinks per day nor >7 drinks per week.    Last PSA:   PSA   Date Value Ref Range Status   08/01/2016 0.46 0 - 4 ug/L Final     Comment:     Assay Method:  Chemiluminescence using Siemens Vista analyzer       Reviewed orders with patient. Reviewed health maintenance and updated orders accordingly - Yes  BP Readings from Last 3 Encounters:   11/13/17 132/89   06/22/17 124/82   08/03/16 124/84    Wt Readings from Last 3 Encounters:   11/13/17 217 lb (98.4 kg)   06/22/17 222 lb (100.7 kg)   08/03/16 226 lb (102.5 kg)                      Reviewed and updated as needed this visit by clinical staff         Reviewed and updated as needed this visit by Provider        He gets occasional heartburn and doesn't tolerate dairy well  Review of Systems  C: NEGATIVE for fever, chills, change in weight  I: NEGATIVE for worrisome rashes, moles or lesions  E: NEGATIVE  "for vision changes or irritation  ENT: NEGATIVE for ear, mouth and throat problems  R: NEGATIVE for significant cough or SOB  CV: NEGATIVE for chest pain, palpitations or peripheral edema  GI: NEGATIVE for nausea, abdominal pain, heartburn, or change in bowel habits   male: negative for dysuria, hematuria, decreased urinary stream, erectile dysfunction, urethral discharge  M: NEGATIVE for significant arthralgias or myalgia  N: NEGATIVE for weakness, dizziness or paresthesias  E: NEGATIVE for temperature intolerance, skin/hair changes  P: NEGATIVE for changes in mood or affect    OBJECTIVE:   /89 (BP Location: Right arm, Patient Position: Chair, Cuff Size: Adult Large)  Pulse 76  Temp 97.9  F (36.6  C) (Oral)  Resp 14  Ht 5' 10\" (1.778 m)  Wt 217 lb (98.4 kg)  SpO2 96%  BMI 31.14 kg/m2      Physical Exam  GENERAL: healthy, alert and no distress  EYES: Eyes grossly normal to inspection, PERRL and conjunctivae and sclerae normal  HENT: ear canals and TM's normal, nose and mouth without ulcers or lesions  NECK: no adenopathy, no asymmetry, masses, or scars and thyroid normal to palpation  RESP: lungs clear to auscultation - no rales, rhonchi or wheezes  CV: regular rate and rhythm, normal S1 S2, no S3 or S4, no murmur, click or rub, no peripheral edema and peripheral pulses strong  ABDOMEN: soft, nontender, no hepatosplenomegaly, no masses and bowel sounds normal  MS: no gross musculoskeletal defects noted, no edema  SKIN: no suspicious lesions or rashes  NEURO: Normal strength and tone, mentation intact and speech normal  PSYCH: mentation appears normal, affect normal/bright    ASSESSMENT/PLAN:   ..    COUNSELING:   Reviewed preventive health counseling, as reflected in patient instructions       Regular exercise       Healthy diet/nutrition       Vision screening    /89 (BP Location: Right arm, Patient Position: Chair, Cuff Size: Adult Large)  Pulse 76  Temp 97.9  F (36.6  C) (Oral)  Resp 14  " "Ht 5' 10\" (1.778 m)  Wt 217 lb (98.4 kg)  SpO2 96%  BMI 31.14 kg/m2       reports that he has never smoked. He has never used smokeless tobacco.  (J45.20) Mild intermittent asthma without complication  (primary encounter diagnosis)  Comment:   Plan: Asthma Action Plan (AAP)        Doing well    (Z11.59) Encounter for hepatitis C screening test for low risk patient  Comment:   Plan: Hepatitis C Screen Reflex to HCV RNA Quant and         Genotype            (Z00.00) Routine general medical examination at a health care facility  Comment:   Plan: Lipid panel reflex to direct LDL Fasting,         Comprehensive metabolic panel (BMP + Alb, Alk         Phos, ALT, AST, Total. Bili, TP)            (Z12.5) Screening for prostate cancer  Comment:   Plan: PSA, screen            (Z23) Need for prophylactic vaccination and inoculation against influenza  Comment:   Plan: FLU VAC, SPLIT VIRUS IM > 3 YO (QUADRIVALENT)         [53662]                Estimated body mass index is 31.85 kg/(m^2) as calculated from the following:    Height as of 6/22/17: 5' 10\" (1.778 m).    Weight as of 6/22/17: 222 lb (100.7 kg).   Weight management plan: Discussed healthy diet and exercise guidelines and patient will follow up in 6 months in clinic to re-evaluate.    Counseling Resources:  ATP IV Guidelines  Pooled Cohorts Equation Calculator  FRAX Risk Assessment  ICSI Preventive Guidelines  Dietary Guidelines for Americans, 2010  USDA's MyPlate  ASA Prophylaxis  Lung CA Screening    Alvaro Fisher MD  [facility]  Answers for HPI/ROS submitted by the patient on 11/13/2017   PHQ-2 Score: 0    "

## 2017-11-13 NOTE — LETTER
My Asthma Action Plan  Name: Rasheed Zimmerman   YOB: 1955  Date: 11/13/2017   My doctor: Alvaro Fisher MD   My clinic: [facility]        My Control Medicine: none current   My Rescue Medicine: Albuterol (Proair/Ventolin/Proventil) inhaler q4h   My Asthma Severity: intermittent  Avoid your asthma triggers: smoke and pollens               GREEN ZONE   Good Control    I feel good    No cough or wheeze    Can work, sleep and play without asthma symptoms       Take your asthma control medicine every day.     1. If exercise triggers your asthma, take your rescue medication    15 minutes before exercise or sports, and    During exercise if you have asthma symptoms  2. Spacer to use with inhaler: If you have a spacer, make sure to use it with your inhaler             YELLOW ZONE Getting Worse  I have ANY of these:    I do not feel good    Cough or wheeze    Chest feels tight    Wake up at night   1. Keep taking your Green Zone medications  2. Start taking your rescue medicine:    every 20 minutes for up to 1 hour. Then every 4 hours for 24-48 hours.  3. If you stay in the Yellow Zone for more than 12-24 hours, contact your doctor.  4. If you do not return to the Green Zone in 12-24 hours or you get worse, start taking your oral steroid medicine if prescribed by your provider.           RED ZONE Medical Alert - Get Help  I have ANY of these:    I feel awful    Medicine is not helping    Breathing getting harder    Trouble walking or talking    Nose opens wide to breathe       1. Take your rescue medicine NOW  2. If your provider has prescribed an oral steroid medicine, start taking it NOW  3. Call your doctor NOW  4. If you are still in the Red Zone after 20 minutes and you have not reached your doctor:    Take your rescue medicine again and    Call 911 or go to the emergency room right away    See your regular doctor within 2 weeks of an Emergency Room or Urgent Care visit for  follow-up treatment.        Electronically signed by: Alvaro Fisher, November 13, 2017    Annual Reminders:  Meet with Asthma Educator,  Flu Shot in the Fall, consider Pneumonia Vaccination for patients with asthma (aged 19 and older).    Pharmacy: [facility] [address] [place] [address] [address] AT [facility] [address]                    Asthma Triggers  How To Control Things That Make Your Asthma Worse    Triggers are things that make your asthma worse.  Look at the list below to help you find your triggers and what you can do about them.  You can help prevent asthma flare-ups by staying away from your triggers.      Trigger                                                          What you can do   Cigarette Smoke  Tobacco smoke can make asthma worse. Do not allow smoking in your home, car or around you.  Be sure no one smokes at a child s day care or school.  If you smoke, ask your health care provider for ways to help you quit.  Ask family members to quit too.  Ask your health care provider for a referral to Quit Plan to help you quit smoking, or call 1-432-117-PLAN.     Colds, Flu, Bronchitis  These are common triggers of asthma. Wash your hands often.  Don t touch your eyes, nose or mouth.  Get a flu shot every year.     Dust Mites  These are tiny bugs that live in cloth or carpet. They are too small to see. Wash sheets and blankets in hot water every week.   Encase pillows and mattress in dust mite proof covers.  Avoid having carpet if you can. If you have carpet, vacuum weekly.   Use a dust mask and HEPA vacuum.   Pollen and Outdoor Mold  Some people are allergic to trees, grass, or weed pollen, or molds. Try to keep your windows closed.  Limit time out doors when pollen count is high.   Ask you health care provider about taking medicine during allergy season.     Animal Dander  Some people are allergic to skin flakes, urine or saliva from pets with fur or feathers. Keep pets  with fur or feathers out of your home.    If you can t keep the pet outdoors, then keep the pet out of your bedroom.  Keep the bedroom door closed.  Keep pets off cloth furniture and away from stuffed toys.     Mice, Rats, and Cockroaches  Some people are allergic to the waste from these pests.   Cover food and garbage.  Clean up spills and food crumbs.  Store grease in the refrigerator.   Keep food out of the bedroom.   Indoor Mold  This can be a trigger if your home has high moisture. Fix leaking faucets, pipes, or other sources of water.   Clean moldy surfaces.  Dehumidify basement if it is damp and smelly.   Smoke, Strong Odors, and Sprays  These can reduce air quality. Stay away from strong odors and sprays, such as perfume, powder, hair spray, paints, smoke incense, paint, cleaning products, candles and new carpet.   Exercise or Sports  Some people with asthma have this trigger. Be active!  Ask your doctor about taking medicine before sports or exercise to prevent symptoms.    Warm up for 5-10 minutes before and after sports or exercise.     Other Triggers of Asthma  Cold air:  Cover your nose and mouth with a scarf.  Sometimes laughing or crying can be a trigger.  Some medicines and food can trigger asthma.

## 2017-11-13 NOTE — PROGRESS NOTES

## 2017-11-13 NOTE — MR AVS SNAPSHOT
After Visit Summary   11/13/2017    Rasheed Zimmerman    MRN: [id number]           Patient Information     Date Of Birth          1955        Visit Information        Provider Department      11/13/2017 3:15 PM Alvaro Fisher MD [facility]        Today's Diagnoses     Mild intermittent asthma without complication    -  1    Encounter for hepatitis C screening test for low risk patient        Routine general medical examination at a health care facility        Screening for prostate cancer           Follow-ups after your visit        Who to contact     If you have questions or need follow up information about today's clinic visit or your schedule please contact [facility] directly at [phone].  Normal or non-critical lab and imaging results will be communicated to you by MyChart, letter or phone within 4 business days after the clinic has received the results. If you do not hear from us within 7 days, please contact the clinic through ScanSafehart or phone. If you have a critical or abnormal lab result, we will notify you by phone as soon as possible.  Submit refill requests through [employer] or call your pharmacy and they will forward the refill request to us. Please allow 3 business days for your refill to be completed.          Additional Information About Your Visit        MyChart Information     [employer] gives you secure access to your electronic health record. If you see a primary care provider, you can also send messages to your care team and make appointments. If you have questions, please call your primary care clinic.  If you do not have a primary care provider, please call [phone] and they will assist you.        Care EveryWhere ID     This is your Care EveryWhere ID. This could be used by other organizations to access your [facility] medical records  [id number]        Your Vitals Were     Pulse Temperature Respirations Height Pulse  "Oximetry BMI (Body Mass Index)    76 97.9  F (36.6  C) (Oral) 14 5' 10\" (1.778 m) 96% 31.14 kg/m2       Blood Pressure from Last 3 Encounters:   11/13/17 132/89   06/22/17 124/82   08/03/16 124/84    Weight from Last 3 Encounters:   11/13/17 217 lb (98.4 kg)   06/22/17 222 lb (100.7 kg)   08/03/16 226 lb (102.5 kg)              We Performed the Following     Asthma Action Plan (AAP)     Comprehensive metabolic panel (BMP + Alb, Alk Phos, ALT, AST, Total. Bili, TP)     Hepatitis C Screen Reflex to HCV RNA Quant and Genotype     Lipid panel reflex to direct LDL Fasting     PSA, screen        Primary Care Provider Office Phone # Fax #    Alvaro Poncho Fisher [phone] [address] [facility] [address]        Equal Access to Services     WILLIAM FRANK : Hadii aad ku hadasho Soomaali, waaxda luqadaha, qaybta kaalmada adeegyada, waxay danielin haybonyn best plasencia . So [facility] [phone].    ATENCIÓN: Si florian clarke, tiene a dickey disposición servicios gratuitos de asistencia lingüística. Llame al [phone].    We comply with applicable federal civil rights laws and [place] laws. We do not discriminate on the basis of race, color, national origin, age, disability, sex, sexual orientation, or gender identity.            Thank you!     Thank you for choosing [facility]  for your care. Our goal is always to provide you with excellent care. Hearing back from our patients is one way we can continue to improve our services. Please take a few minutes to complete the written survey that you may receive in the mail after your visit with us. Thank you!             Your Updated Medication List - Protect others around you: Learn how to safely use, store and throw away your medicines at www.disposemymeds.org.          This list is accurate as of: 11/13/17  3:54 PM.  Always use your most recent med list.                   Brand Name Dispense Instructions for use Diagnosis    * " albuterol 108 (90 BASE) MCG/ACT Inhaler    PROAIR HFA/PROVENTIL HFA/VENTOLIN HFA     Inhale 2 puffs into the lungs every 6 hours        * albuterol (2.5 MG/3ML) 0.083% neb solution     1 vial    Take 1 vial (2.5 mg) by nebulization every 6 hours as needed for shortness of breath / dyspnea or wheezing Take one vial (2.5mg) by Nebulization once for 1 dose -    Asthmatic bronchitis, mild intermittent, uncomplicated, Pneumonitis       BREO ELLIPTA 200-25 MCG/INH oral inhaler   Generic drug:  fluticasone-vilanterol       Asthmatic bronchitis, mild intermittent, uncomplicated, Pneumonitis       celecoxib 200 MG capsule    celeBREX     TK 1 C PO QD PRN        IBU-200 PO       Asthmatic bronchitis, mild intermittent, uncomplicated, Pneumonitis       MONOKET PO           NASONEX 50 MCG/ACT spray   Generic drug:  mometasone     1    1 puff each nostril twice daily    Acute conjunctivitis, unspecified       simvastatin 20 MG tablet    ZOCOR     TAKE 1 TABLET BY MOUTH EVERY NIGHT AT BEDTIME.    Hyperlipidemia LDL goal <130       SINGULAIR 10 MG tablet   Generic drug:  montelukast     90    ONE TABLET DAILY        VITAMIN D PO      as directed        * Notice:  This list has 2 medication(s) that are the same as other medications prescribed for you. Read the directions carefully, and ask your doctor or other care provider to review them with you.

## 2017-11-14 LAB
ALBUMIN SERPL-MCNC: 4 G/DL (ref 3.4–5)
ALP SERPL-CCNC: 73 U/L (ref 40–150)
ALT SERPL W P-5'-P-CCNC: 28 U/L (ref 0–70)
ANION GAP SERPL CALCULATED.3IONS-SCNC: 11 MMOL/L (ref 3–14)
AST SERPL W P-5'-P-CCNC: 29 U/L (ref 0–45)
BILIRUB SERPL-MCNC: 0.9 MG/DL (ref 0.2–1.3)
BUN SERPL-MCNC: 17 MG/DL (ref 7–30)
CALCIUM SERPL-MCNC: 8.9 MG/DL (ref 8.5–10.1)
CHLORIDE SERPL-SCNC: 106 MMOL/L (ref 94–109)
CHOLEST SERPL-MCNC: 196 MG/DL
CO2 SERPL-SCNC: 23 MMOL/L (ref 20–32)
CREAT SERPL-MCNC: 0.85 MG/DL (ref 0.66–1.25)
GFR SERPL CREATININE-BSD FRML MDRD: >90 ML/MIN/1.7M2
GLUCOSE SERPL-MCNC: 83 MG/DL (ref 70–99)
HCV AB SERPL QL IA: NONREACTIVE
HDLC SERPL-MCNC: 47 MG/DL
LDLC SERPL CALC-MCNC: 124 MG/DL
NONHDLC SERPL-MCNC: 149 MG/DL
POTASSIUM SERPL-SCNC: 4.2 MMOL/L (ref 3.4–5.3)
PROT SERPL-MCNC: 7.2 G/DL (ref 6.8–8.8)
PSA SERPL-ACNC: 0.8 UG/L (ref 0–4)
SODIUM SERPL-SCNC: 140 MMOL/L (ref 133–144)
TRIGL SERPL-MCNC: 123 MG/DL

## 2017-11-14 ASSESSMENT — ASTHMA QUESTIONNAIRES: ACT_TOTALSCORE: 25

## 2017-12-20 DIAGNOSIS — E78.5 HYPERLIPIDEMIA LDL GOAL <130: ICD-10-CM

## 2017-12-21 NOTE — TELEPHONE ENCOUNTER
Requested Prescriptions   Pending Prescriptions Disp Refills     simvastatin (ZOCOR) 20 MG tablet [Pharmacy Med Name: SIMVASTATIN 20MG TABLETS] 90 tablet 0    Last Written Prescription Date:  9/8/17  Last Fill Quantity: 30,  # refills: 0   Last Office Visit with [facility], [facility] or [facility] prescribing provider:  11/13/2017   Future Office Visit:      Sig: TAKE 1 TABLET BY MOUTH AT BEDTIME    Statins Protocol Passed    12/20/2017  4:35 PM       Passed - LDL on file in past 12 months    Recent Labs   Lab Test  11/13/17   1604   LDL  124*          Passed - No abnormal creatine kinase in past 12 months    No lab results found.         Passed - Recent or future visit with authorizing provider    Patient had office visit in the last year or has a visit in the next 30 days with authorizing provider.  See chart review.          Passed - Patient is age 18 or older

## 2017-12-22 RX ORDER — SIMVASTATIN 20 MG
TABLET ORAL
Qty: 90 TABLET | Refills: 3 | Status: SHIPPED | OUTPATIENT
Start: 2017-12-22 | End: 2018-12-24

## 2017-12-22 NOTE — TELEPHONE ENCOUNTER
Prescription approved per [facility] Refill Protocol.  Arely Keller RN

## 2018-12-24 DIAGNOSIS — E78.5 HYPERLIPIDEMIA LDL GOAL <130: ICD-10-CM

## 2018-12-24 NOTE — TELEPHONE ENCOUNTER
"Requested Prescriptions   Pending Prescriptions Disp Refills     simvastatin (ZOCOR) 20 MG tablet [Pharmacy Med Name: SIMVASTATIN 20MG TABLETS]  Last Written Prescription Date:  12/22/2017  Last Fill Quantity: 90 tablet,  # refills: 3   Last office visit: 11/13/2017 with prescribing provider:  Natalia   Future Office Visit:     90 tablet 0     Sig: TAKE 1 TABLET BY MOUTH AT BEDTIME    Statins Protocol Failed - 12/24/2018  3:25 AM       Failed - LDL on file in past 12 months    Recent Labs   Lab Test 11/13/17  1604   *            Failed - Recent (12 mo) or future (30 days) visit within the authorizing provider's specialty    Patient had office visit in the last 12 months or has a visit in the next 30 days with authorizing provider or within the authorizing provider's specialty.  See \"Patient Info\" tab in inbasket, or \"Choose Columns\" in Meds & Orders section of the refill encounter.             Passed - No abnormal creatine kinase in past 12 months    No lab results found.            Passed - Patient is age 18 or older          "

## 2018-12-24 NOTE — LETTER
December 26, 2018      Rasheed Zimmerman  [address] [place] [address]    We recently received a call from your pharmacy requesting a refill of your medication (simvastatin).     A review of your chart indicates that an appointment is required with your provider for annual visit and fasting labs. Please call the clinic at [phone] to schedule your appointment.     We have authorized one refill of your medication to allow time for you to schedule your appointment.     Taking care of your health is important to us, and ongoing visits with your provider are vital to your care.  We look forward to seeing you in the near future.              Sincerely,        Alvaro Fisher MD/Yissel SHEA

## 2018-12-26 DIAGNOSIS — E78.5 HYPERLIPIDEMIA LDL GOAL <130: ICD-10-CM

## 2018-12-26 RX ORDER — SIMVASTATIN 20 MG
TABLET ORAL
Qty: 30 TABLET | Refills: 0 | Status: SHIPPED | OUTPATIENT
Start: 2018-12-26 | End: 2019-01-14

## 2018-12-26 NOTE — TELEPHONE ENCOUNTER
Apt letter sent  Prescription approved per [facility] Refill Protocol.  Yissel Giordano RN, BSN

## 2018-12-28 RX ORDER — SIMVASTATIN 20 MG
TABLET ORAL
Qty: 90 TABLET | Refills: 0 | OUTPATIENT
Start: 2018-12-28

## 2018-12-28 NOTE — TELEPHONE ENCOUNTER
"Requested Prescriptions   Pending Prescriptions Disp Refills     simvastatin (ZOCOR) 20 MG tablet [Pharmacy Med Name: SIMVASTATIN 20MG TABLETS]  Medication may not be due for refill  Last Written Prescription Date:  12/26/2018  Last Fill Quantity: 30 tablet,  # refills: 0   Last office visit: 11/13/2017 with prescribing provider:  Natalia   Future Office Visit:     90 tablet 0     Sig: TAKE 1 TABLET BY MOUTH EVERY NIGHT AT BEDTIME    Statins Protocol Failed - 12/26/2018 12:46 PM       Failed - LDL on file in past 12 months    Recent Labs   Lab Test 11/13/17  1604   *            Failed - Recent (12 mo) or future (30 days) visit within the authorizing provider's specialty    Patient had office visit in the last 12 months or has a visit in the next 30 days with authorizing provider or within the authorizing provider's specialty.  See \"Patient Info\" tab in inbasket, or \"Choose Columns\" in Meds & Orders section of the refill encounter.             Passed - No abnormal creatine kinase in past 12 months    No lab results found.            Passed - Patient is age 18 or older          "

## 2019-01-14 ENCOUNTER — OFFICE VISIT (OUTPATIENT)
Dept: FAMILY MEDICINE | Facility: CLINIC | Age: 64
End: 2019-01-14
Payer: COMMERCIAL

## 2019-01-14 VITALS
SYSTOLIC BLOOD PRESSURE: 144 MMHG | DIASTOLIC BLOOD PRESSURE: 84 MMHG | HEIGHT: 69 IN | WEIGHT: 211.8 LBS | BODY MASS INDEX: 31.37 KG/M2 | OXYGEN SATURATION: 98 % | TEMPERATURE: 98 F | HEART RATE: 59 BPM

## 2019-01-14 DIAGNOSIS — Z00.00 WELL ADULT EXAM: ICD-10-CM

## 2019-01-14 DIAGNOSIS — Z13.220 SCREENING FOR LIPID DISORDERS: ICD-10-CM

## 2019-01-14 DIAGNOSIS — E78.5 HYPERLIPIDEMIA LDL GOAL <130: ICD-10-CM

## 2019-01-14 DIAGNOSIS — M15.0 PRIMARY OSTEOARTHRITIS INVOLVING MULTIPLE JOINTS: ICD-10-CM

## 2019-01-14 DIAGNOSIS — Z12.5 SCREENING FOR PROSTATE CANCER: Primary | ICD-10-CM

## 2019-01-14 PROCEDURE — 80061 LIPID PANEL: CPT | Performed by: FAMILY MEDICINE

## 2019-01-14 PROCEDURE — 80053 COMPREHEN METABOLIC PANEL: CPT | Performed by: FAMILY MEDICINE

## 2019-01-14 PROCEDURE — 99396 PREV VISIT EST AGE 40-64: CPT | Performed by: FAMILY MEDICINE

## 2019-01-14 PROCEDURE — 36415 COLL VENOUS BLD VENIPUNCTURE: CPT | Performed by: FAMILY MEDICINE

## 2019-01-14 PROCEDURE — G0103 PSA SCREENING: HCPCS | Performed by: FAMILY MEDICINE

## 2019-01-14 RX ORDER — SIMVASTATIN 20 MG
20 TABLET ORAL AT BEDTIME
Qty: 90 TABLET | Refills: 3 | Status: SHIPPED | OUTPATIENT
Start: 2019-01-14 | End: 2020-01-20

## 2019-01-14 ASSESSMENT — ENCOUNTER SYMPTOMS
HEADACHES: 0
EYE PAIN: 0
SHORTNESS OF BREATH: 0
ABDOMINAL PAIN: 0
ARTHRALGIAS: 1
HEMATURIA: 0
PALPITATIONS: 0
JOINT SWELLING: 0
DYSURIA: 0
DIZZINESS: 0
FREQUENCY: 0
MYALGIAS: 0
SORE THROAT: 0
FEVER: 0
PARESTHESIAS: 0
CHILLS: 0
HEMATOCHEZIA: 0
CONSTIPATION: 0
HEARTBURN: 0
NERVOUS/ANXIOUS: 0
DIARRHEA: 0
COUGH: 0
WEAKNESS: 0
NAUSEA: 0

## 2019-01-14 ASSESSMENT — MIFFLIN-ST. JEOR: SCORE: 1746.1

## 2019-01-14 NOTE — PROGRESS NOTES
"SUBJECTIVE:   CC: Rasheed Zimmerman is an 63 year old male who presents for preventative health visit.     Physical   Annual:     Getting at least 3 servings of Calcium per day:  Yes    Bi-annual eye exam:  Yes    Dental care twice a year:  Yes    Sleep apnea or symptoms of sleep apnea:  None    Diet:  Regular (no restrictions)    Frequency of exercise:  2-3 days/week    Duration of exercise:  30-45 minutes    Taking medications regularly:  Yes    Medication side effects:  None    Additional concerns today:  No    PHQ-2 Total Score: 0      /84 (BP Location: Right arm, Patient Position: Chair, Cuff Size: Adult Large)   Pulse 59   Temp 98  F (36.7  C) (Oral)   Ht 1.753 m (5' 9\")   Wt 96.1 kg (211 lb 12.8 oz)   SpO2 98%   BMI 31.28 kg/m            Today's PHQ-2 Score:   PHQ-2 ( 1999 Pfizer) 1/14/2019   Q1: Little interest or pleasure in doing things 0   Q2: Feeling down, depressed or hopeless 0   PHQ-2 Score 0   Q1: Little interest or pleasure in doing things Not at all   Q2: Feeling down, depressed or hopeless Not at all   PHQ-2 Score 0       Abuse: Current or Past(Physical, Sexual or Emotional)- No  Do you feel safe in your environment? Yes    Social History     Tobacco Use     Smoking status: Never Smoker     Smokeless tobacco: Never Used   Substance Use Topics     Alcohol use: No     Alcohol Use 1/14/2019   If you drink alcohol do you typically have greater than 3 drinks per day OR greater than 7 drinks per week? Not Applicable       Last PSA:   PSA   Date Value Ref Range Status   11/13/2017 0.80 0 - 4 ug/L Final     Comment:     Assay Method:  Chemiluminescence using Siemens Vista analyzer       Reviewed orders with patient. Reviewed health maintenance and updated orders accordingly - Yes  BP Readings from Last 3 Encounters:   01/14/19 153/87   11/13/17 132/89   06/22/17 124/82    Wt Readings from Last 3 Encounters:   01/14/19 96.1 kg (211 lb 12.8 oz)   11/13/17 98.4 kg (217 lb)   06/22/17 100.7 kg " "(222 lb)                    Reviewed and updated as needed this visit by clinical staff         Reviewed and updated as needed this visit by Provider            Review of Systems   Constitutional: Negative for chills and fever.   HENT: Negative for congestion, ear pain, hearing loss and sore throat.    Eyes: Negative for pain and visual disturbance.   Respiratory: Negative for cough and shortness of breath.    Cardiovascular: Negative for chest pain, palpitations and peripheral edema.   Gastrointestinal: Negative for abdominal pain, constipation, diarrhea, heartburn, hematochezia and nausea.   Genitourinary: Negative for discharge, dysuria, frequency, genital sores, hematuria, impotence and urgency.   Musculoskeletal: Positive for arthralgias. Negative for joint swelling and myalgias.   Skin: Negative for rash.   Neurological: Negative for dizziness, weakness, headaches and paresthesias.   Psychiatric/Behavioral: Negative for mood changes. The patient is not nervous/anxious.      CONSTITUTIONAL: NEGATIVE for fever, chills, change in weight  INTEGUMENTARY/SKIN: NEGATIVE for worrisome rashes, moles or lesions  EYES: NEGATIVE for vision changes or irritation  ENT: NEGATIVE for ear, mouth and throat problems  RESP: NEGATIVE for significant cough or SOB  CV: NEGATIVE for chest pain, palpitations or peripheral edema  GI: NEGATIVE for nausea, abdominal pain, heartburn, or change in bowel habits   male: negative for dysuria, hematuria, decreased urinary stream, erectile dysfunction, urethral discharge  MUSCULOSKELETAL: NEGATIVE for significant arthralgias or myalgia  NEURO: NEGATIVE for weakness, dizziness or paresthesias  ENDOCRINE: NEGATIVE for temperature intolerance, skin/hair changes  PSYCHIATRIC: NEGATIVE for changes in mood or affect    OBJECTIVE:   /84 (BP Location: Right arm, Patient Position: Chair, Cuff Size: Adult Large)   Pulse 59   Temp 98  F (36.7  C) (Oral)   Ht 1.753 m (5' 9\")   Wt 96.1 kg (211 " "lb 12.8 oz)   SpO2 98%   BMI 31.28 kg/m      Physical Exam  GENERAL: healthy, alert and no distress  EYES: Eyes grossly normal to inspection, PERRL and conjunctivae and sclerae normal  HENT: ear canals and TM's normal, nose and mouth without ulcers or lesions  NECK: no adenopathy, no asymmetry, masses, or scars and thyroid normal to palpation  RESP: lungs clear to auscultation - no rales, rhonchi or wheezes  CV: regular rate and rhythm, normal S1 S2, no S3 or S4, no murmur, click or rub, no peripheral edema and peripheral pulses strong  ABDOMEN: soft, nontender, no hepatosplenomegaly, no masses and bowel sounds normal  MS: no gross musculoskeletal defects noted, no edema  SKIN: no suspicious lesions or rashes  NEURO: Normal strength and tone, mentation intact and speech normal  PSYCH: mentation appears normal, affect normal/bright        ASSESSMENT/PLAN:   He gets hand pain and works with his hands for many years   Bp more labile than in the past:   Get Pharmacy check next month   COUNSELING:   Reviewed preventive health counseling, as reflected in patient instructions       Regular exercise       Healthy diet/nutrition work on weight and bp   .diuag    BP Readings from Last 1 Encounters:   11/13/17 132/89     Estimated body mass index is 31.14 kg/m  as calculated from the following:    Height as of 11/13/17: 1.778 m (5' 10\").    Weight as of 11/13/17: 98.4 kg (217 lb).  WELL ADULT EXAM     (Z12.5) Screening for prostate cancer  (primary encounter diagnosis)  Comment:   Plan: PSA, screen            (Z13.220) Screening for lipid disorders  Comment:   Plan: Lipid panel reflex to direct LDL Fasting            (M15.0) Primary osteoarthritis involving multiple joints  Comment:   Plan: Comprehensive metabolic panel            (E78.5) Hyperlipidemia LDL goal <130  Comment:   Plan: simvastatin (ZOCOR) 20 MG tablet               reports that  has never smoked. he has never used smokeless tobacco.      Counseling " Resources:  ATP IV Guidelines  Pooled Cohorts Equation Calculator  FRAX Risk Assessment  ICSI Preventive Guidelines  Dietary Guidelines for Americans, 2010  [employer]'s MyPlate  ASA Prophylaxis  Lung CA Screening    Alvaro Fisher MD  [facility]

## 2019-01-14 NOTE — LETTER
My Asthma Action Plan  Name: Rasheed Zimmerman   YOB: 1955  Date: 1/14/2019   My doctor: Alvaro Fisher MD   My clinic: [facility]        My Control Medicine: Breo Ellipta   My Rescue Medicine: Albuterol   My Asthma Severity: well controlled  Avoid your asthma triggers: illness               GREEN ZONE   Good Control    I feel good    No cough or wheeze    Can work, sleep and play without asthma symptoms       Take your asthma control medicine every day.     1. If exercise triggers your asthma, take your rescue medication    15 minutes before exercise or sports, and    During exercise if you have asthma symptoms  2. Spacer to use with inhaler: If you have a spacer, make sure to use it with your inhaler             YELLOW ZONE Getting Worse  I have ANY of these:    I do not feel good    Cough or wheeze    Chest feels tight    Wake up at night   1. Keep taking your Green Zone medications  2. Start taking your rescue medicine:    every 20 minutes for up to 1 hour. Then every 4 hours for 24-48 hours.  3. If you stay in the Yellow Zone for more than 12-24 hours, contact your doctor.  4. If you do not return to the Green Zone in 12-24 hours or you get worse, start taking your oral steroid medicine if prescribed by your provider.           RED ZONE Medical Alert - Get Help  I have ANY of these:    I feel awful    Medicine is not helping    Breathing getting harder    Trouble walking or talking    Nose opens wide to breathe       1. Take your rescue medicine NOW  2. If your provider has prescribed an oral steroid medicine, start taking it NOW  3. Call your doctor NOW  4. If you are still in the Red Zone after 20 minutes and you have not reached your doctor:    Take your rescue medicine again and    Call 911 or go to the emergency room right away    See your regular doctor within 2 weeks of an Emergency Room or Urgent Care visit for follow-up treatment.          Annual Reminders:   Meet with Asthma Educator,  Flu Shot in the Fall, consider Pneumonia Vaccination for patients with asthma (aged 19 and older).    Pharmacy: [facility] [address] [place] [address] [address] AT [facility] [address]                      Asthma Triggers  How To Control Things That Make Your Asthma Worse    Triggers are things that make your asthma worse.  Look at the list below to help you find your triggers and what you can do about them.  You can help prevent asthma flare-ups by staying away from your triggers.      Trigger                                                          What you can do   Cigarette Smoke  Tobacco smoke can make asthma worse. Do not allow smoking in your home, car or around you.  Be sure no one smokes at a child s day care or school.  If you smoke, ask your health care provider for ways to help you quit.  Ask family members to quit too.  Ask your health care provider for a referral to Quit Plan to help you quit smoking, or call 2-781-531-PLAN.     Colds, Flu, Bronchitis  These are common triggers of asthma. Wash your hands often.  Don t touch your eyes, nose or mouth.  Get a flu shot every year.     Dust Mites  These are tiny bugs that live in cloth or carpet. They are too small to see. Wash sheets and blankets in hot water every week.   Encase pillows and mattress in dust mite proof covers.  Avoid having carpet if you can. If you have carpet, vacuum weekly.   Use a dust mask and HEPA vacuum.   Pollen and Outdoor Mold  Some people are allergic to trees, grass, or weed pollen, or molds. Try to keep your windows closed.  Limit time out doors when pollen count is high.   Ask you health care provider about taking medicine during allergy season.     Animal Dander  Some people are allergic to skin flakes, urine or saliva from pets with fur or feathers. Keep pets with fur or feathers out of your home.    If you can t keep the pet outdoors, then keep the pet out of your bedroom.   Keep the bedroom door closed.  Keep pets off cloth furniture and away from stuffed toys.     Mice, Rats, and Cockroaches  Some people are allergic to the waste from these pests.   Cover food and garbage.  Clean up spills and food crumbs.  Store grease in the refrigerator.   Keep food out of the bedroom.   Indoor Mold  This can be a trigger if your home has high moisture. Fix leaking faucets, pipes, or other sources of water.   Clean moldy surfaces.  Dehumidify basement if it is damp and smelly.   Smoke, Strong Odors, and Sprays  These can reduce air quality. Stay away from strong odors and sprays, such as perfume, powder, hair spray, paints, smoke incense, paint, cleaning products, candles and new carpet.   Exercise or Sports  Some people with asthma have this trigger. Be active!  Ask your doctor about taking medicine before sports or exercise to prevent symptoms.    Warm up for 5-10 minutes before and after sports or exercise.     Other Triggers of Asthma  Cold air:  Cover your nose and mouth with a scarf.  Sometimes laughing or crying can be a trigger.  Some medicines and food can trigger asthma.

## 2019-01-15 LAB
ALBUMIN SERPL-MCNC: 4.3 G/DL (ref 3.4–5)
ALP SERPL-CCNC: 68 U/L (ref 40–150)
ALT SERPL W P-5'-P-CCNC: 29 U/L (ref 0–70)
ANION GAP SERPL CALCULATED.3IONS-SCNC: 9 MMOL/L (ref 3–14)
AST SERPL W P-5'-P-CCNC: 16 U/L (ref 0–45)
BILIRUB SERPL-MCNC: 1 MG/DL (ref 0.2–1.3)
BUN SERPL-MCNC: 20 MG/DL (ref 7–30)
CALCIUM SERPL-MCNC: 9.4 MG/DL (ref 8.5–10.1)
CHLORIDE SERPL-SCNC: 105 MMOL/L (ref 94–109)
CHOLEST SERPL-MCNC: 162 MG/DL
CO2 SERPL-SCNC: 24 MMOL/L (ref 20–32)
CREAT SERPL-MCNC: 0.95 MG/DL (ref 0.66–1.25)
GFR SERPL CREATININE-BSD FRML MDRD: 85 ML/MIN/{1.73_M2}
GLUCOSE SERPL-MCNC: 87 MG/DL (ref 70–99)
HDLC SERPL-MCNC: 55 MG/DL
LDLC SERPL CALC-MCNC: 88 MG/DL
NONHDLC SERPL-MCNC: 107 MG/DL
POTASSIUM SERPL-SCNC: 4.2 MMOL/L (ref 3.4–5.3)
PROT SERPL-MCNC: 7.4 G/DL (ref 6.8–8.8)
PSA SERPL-ACNC: 1.04 UG/L (ref 0–4)
SODIUM SERPL-SCNC: 138 MMOL/L (ref 133–144)
TRIGL SERPL-MCNC: 97 MG/DL

## 2019-01-15 ASSESSMENT — ASTHMA QUESTIONNAIRES: ACT_TOTALSCORE: 24

## 2019-05-01 ENCOUNTER — OFFICE VISIT (OUTPATIENT)
Dept: URGENT CARE | Facility: URGENT CARE | Age: 64
End: 2019-05-01
Payer: COMMERCIAL

## 2019-05-01 VITALS
OXYGEN SATURATION: 96 % | DIASTOLIC BLOOD PRESSURE: 80 MMHG | TEMPERATURE: 98.6 F | HEART RATE: 92 BPM | SYSTOLIC BLOOD PRESSURE: 120 MMHG

## 2019-05-01 DIAGNOSIS — J45.20 MILD INTERMITTENT ASTHMATIC BRONCHITIS WITHOUT COMPLICATION: Primary | ICD-10-CM

## 2019-05-01 DIAGNOSIS — R50.9 FEVER IN ADULT: ICD-10-CM

## 2019-05-01 DIAGNOSIS — R05.9 COUGH: ICD-10-CM

## 2019-05-01 LAB
BASOPHILS # BLD AUTO: 0 10E9/L (ref 0–0.2)
BASOPHILS NFR BLD AUTO: 0.6 %
DIFFERENTIAL METHOD BLD: ABNORMAL
EOSINOPHIL # BLD AUTO: 0 10E9/L (ref 0–0.7)
EOSINOPHIL NFR BLD AUTO: 0.2 %
ERYTHROCYTE [DISTWIDTH] IN BLOOD BY AUTOMATED COUNT: 14 % (ref 10–15)
FLUAV+FLUBV AG SPEC QL: NEGATIVE
FLUAV+FLUBV AG SPEC QL: NEGATIVE
HCT VFR BLD AUTO: 50.2 % (ref 40–53)
HGB BLD-MCNC: 17.6 G/DL (ref 13.3–17.7)
LYMPHOCYTES # BLD AUTO: 0.7 10E9/L (ref 0.8–5.3)
LYMPHOCYTES NFR BLD AUTO: 10.1 %
MCH RBC QN AUTO: 29.5 PG (ref 26.5–33)
MCHC RBC AUTO-ENTMCNC: 35.1 G/DL (ref 31.5–36.5)
MCV RBC AUTO: 84 FL (ref 78–100)
MONOCYTES # BLD AUTO: 1 10E9/L (ref 0–1.3)
MONOCYTES NFR BLD AUTO: 14.3 %
NEUTROPHILS # BLD AUTO: 5 10E9/L (ref 1.6–8.3)
NEUTROPHILS NFR BLD AUTO: 74.8 %
PLATELET # BLD AUTO: 153 10E9/L (ref 150–450)
RBC # BLD AUTO: 5.97 10E12/L (ref 4.4–5.9)
SPECIMEN SOURCE: NORMAL
WBC # BLD AUTO: 6.6 10E9/L (ref 4–11)

## 2019-05-01 PROCEDURE — 99214 OFFICE O/P EST MOD 30 MIN: CPT | Performed by: PHYSICIAN ASSISTANT

## 2019-05-01 PROCEDURE — 36415 COLL VENOUS BLD VENIPUNCTURE: CPT | Performed by: PHYSICIAN ASSISTANT

## 2019-05-01 PROCEDURE — 85025 COMPLETE CBC W/AUTO DIFF WBC: CPT | Performed by: PHYSICIAN ASSISTANT

## 2019-05-01 PROCEDURE — 87804 INFLUENZA ASSAY W/OPTIC: CPT | Mod: 59 | Performed by: PHYSICIAN ASSISTANT

## 2019-05-01 RX ORDER — METHYLPREDNISOLONE 4 MG
TABLET, DOSE PACK ORAL
Qty: 21 TABLET | Refills: 0 | Status: SHIPPED | OUTPATIENT
Start: 2019-05-01 | End: 2021-02-25

## 2019-05-01 RX ORDER — MONTELUKAST SODIUM 10 MG/1
10 TABLET ORAL AT BEDTIME
COMMUNITY
End: 2021-02-25

## 2019-05-01 RX ORDER — AZITHROMYCIN 250 MG/1
TABLET, FILM COATED ORAL
Qty: 6 TABLET | Refills: 0 | Status: SHIPPED | OUTPATIENT
Start: 2019-05-01 | End: 2019-05-06

## 2019-05-02 ASSESSMENT — ENCOUNTER SYMPTOMS
DIARRHEA: 0
WHEEZING: 1
SINUS PRESSURE: 0
FEVER: 1
SINUS PAIN: 0
FATIGUE: 1
VOMITING: 1
COUGH: 1
SORE THROAT: 0

## 2019-05-02 NOTE — PROGRESS NOTES
SUBJECTIVE:   Rasheed Zimmerman is a 63 year old male presenting with a chief complaint of   Chief Complaint   Patient presents with     Urgent Care     URI     Productive cough, colds, fatigue, myalgia, cold and hot flashes, HA, sore throat from coughing, wheezing. Sx x4 days. Pt had been Dx with asthma, albuterol isn't helping       He is an established patient of [facility].    URI Adult    Onset of symptoms was few days ago   Course of illness is worsening.    Severity moderate  Current and Associated symptoms: fever, cough - productive, wheezing, headache, and fatigue  Treatment measures tried include Inhaler (name: Albuterol).  Predisposing factors include HX of asthma. Uses Breo-Ellipta inhaler as maintenance inhaler and albuterol inhaler as rescue inhaler.        Review of Systems   Constitutional: Positive for fatigue and fever.   HENT: Negative for sinus pressure, sinus pain and sore throat.    Respiratory: Positive for cough and wheezing.    Gastrointestinal: Positive for vomiting (once today). Negative for diarrhea.       Past Medical History:   Diagnosis Date     Asthma      Microscopic hematuria 2013     Other and unspecified hyperlipidemia      Other chronic sinusitis      Polyarthropathy      Family History   Problem Relation Age of Onset     C.A.D. Paternal Grandfather          86 YOA, [place]     Diabetes Paternal Grandfather      Neurologic Disorder Paternal Grandfather         zenobia gehrig's disease,  at 64 YOA     Alzheimer Disease Paternal Grandmother         alive at 90     Asthma Paternal Grandmother      Asthma Father      Current Outpatient Medications   Medication Sig Dispense Refill     albuterol (2.5 MG/3ML) 0.083% nebulizer solution Take 1 vial (2.5 mg) by nebulization every 6 hours as needed for shortness of breath / dyspnea or wheezing Take one vial (2.5mg) by Nebulization once for 1 dose - 1 vial 1     albuterol (PROAIR HFA, PROVENTIL HFA, VENTOLIN HFA) 108 (90 BASE)  MCG/ACT inhaler Inhale 2 puffs into the lungs every 6 hours       azithromycin (ZITHROMAX Z-LIZA) 250 MG tablet Take 2 tablets today then 1 daily times 4 days 6 tablet 0     BREO ELLIPTA 200-25 MCG/INH AEPB   6     celecoxib (CELEBREX) 200 MG capsule TK 1 C PO QD PRN  3     Ibuprofen (IBU-200 PO)        methylPREDNISolone (MEDROL DOSEPAK) 4 MG tablet therapy pack Follow Package Directions 21 tablet 0     montelukast (SINGULAIR) 10 MG tablet Take 10 mg by mouth At Bedtime       NASONEX 50 MCG/ACT NA SUSP 1 puff each nostril twice daily 1 12     simvastatin (ZOCOR) 20 MG tablet TAKE 1 TABLET BY MOUTH EVERY NIGHT AT BEDTIME.       VITAMIN D PO as directed        simvastatin (ZOCOR) 20 MG tablet Take 1 tablet (20 mg) by mouth At Bedtime 90 tablet 3     Social History     Tobacco Use     Smoking status: Never Smoker     Smokeless tobacco: Never Used   Substance Use Topics     Alcohol use: No       OBJECTIVE  /80 (BP Location: Right arm, Patient Position: Chair, Cuff Size: Adult Large)   Pulse 92   Temp 98.6  F (37  C) (Oral)   SpO2 96%     Physical Exam   Constitutional: He appears well-developed and well-nourished. No distress.   HENT:   Head: Normocephalic and atraumatic.   Right Ear: Tympanic membrane normal.   Left Ear: Tympanic membrane normal.   Mouth/Throat: Oropharynx is clear and moist.   Eyes: Conjunctivae are normal.   Neck: Normal range of motion.   Cardiovascular: Regular rhythm and normal heart sounds.   Pulmonary/Chest: Effort normal. No respiratory distress. He has wheezes. He has no rales.   Mild diffuse expiratory wheezes and some rhonchi. No crackles.   Neurological: He is alert.   Skin: Skin is warm and dry.   Psychiatric: He has a normal mood and affect.   Nursing note and vitals reviewed.      Labs:  Results for orders placed or performed in visit on 05/01/19 (from the past 24 hour(s))   Influenza A/B antigen   Result Value Ref Range    Influenza A/B Agn Specimen Nasal     Influenza A  Negative NEG^Negative    Influenza B Negative NEG^Negative   CBC with platelets and differential   Result Value Ref Range    WBC 6.6 4.0 - 11.0 10e9/L    RBC Count 5.97 (H) 4.4 - 5.9 10e12/L    Hemoglobin 17.6 13.3 - 17.7 g/dL    Hematocrit 50.2 40.0 - 53.0 %    MCV 84 78 - 100 fl    MCH 29.5 26.5 - 33.0 pg    MCHC 35.1 31.5 - 36.5 g/dL    RDW 14.0 10.0 - 15.0 %    Platelet Count 153 150 - 450 10e9/L    % Neutrophils 74.8 %    % Lymphocytes 10.1 %    % Monocytes 14.3 %    % Eosinophils 0.2 %    % Basophils 0.6 %    Absolute Neutrophil 5.0 1.6 - 8.3 10e9/L    Absolute Lymphocytes 0.7 (L) 0.8 - 5.3 10e9/L    Absolute Monocytes 1.0 0.0 - 1.3 10e9/L    Absolute Eosinophils 0.0 0.0 - 0.7 10e9/L    Absolute Basophils 0.0 0.0 - 0.2 10e9/L    Diff Method Automated Method        X-Ray was not done.    ASSESSMENT:      ICD-10-CM    1. Mild intermittent asthmatic bronchitis without complication J45.20 azithromycin (ZITHROMAX Z-LIZA) 250 MG tablet     methylPREDNISolone (MEDROL DOSEPAK) 4 MG tablet therapy pack   2. Cough R05    3. Fever in adult R50.9 CBC with platelets and differential     Influenza A/B antigen          PLAN:    Asthmatic bronchitis: Zithromax Rx. Medrol dose pack Rx. Continue albuterol inhaler prn. CBC reassuring. Rapid influenza is negative. Follow up if any worsening symptoms. Patient agrees.    Followup:    If not improving or if condition worsens, follow up with your Primary Care Provider

## 2019-07-01 ENCOUNTER — OFFICE VISIT (OUTPATIENT)
Dept: URGENT CARE | Facility: URGENT CARE | Age: 64
End: 2019-07-01
Payer: COMMERCIAL

## 2019-07-01 VITALS
TEMPERATURE: 98.2 F | HEART RATE: 77 BPM | SYSTOLIC BLOOD PRESSURE: 120 MMHG | DIASTOLIC BLOOD PRESSURE: 72 MMHG | OXYGEN SATURATION: 96 %

## 2019-07-01 DIAGNOSIS — J45.901 EXACERBATION OF ASTHMA, UNSPECIFIED ASTHMA SEVERITY, UNSPECIFIED WHETHER PERSISTENT: Primary | ICD-10-CM

## 2019-07-01 PROCEDURE — 99214 OFFICE O/P EST MOD 30 MIN: CPT | Performed by: FAMILY MEDICINE

## 2019-07-01 RX ORDER — AZITHROMYCIN 250 MG/1
TABLET, FILM COATED ORAL
Qty: 6 TABLET | Refills: 0 | Status: SHIPPED | OUTPATIENT
Start: 2019-07-01 | End: 2019-07-06

## 2019-07-01 RX ORDER — PREDNISONE 20 MG/1
40 TABLET ORAL DAILY
Qty: 10 TABLET | Refills: 1 | Status: SHIPPED | OUTPATIENT
Start: 2019-07-01 | End: 2019-07-06

## 2019-07-01 RX ORDER — BENZONATATE 100 MG/1
100 CAPSULE ORAL 3 TIMES DAILY PRN
Qty: 21 CAPSULE | Refills: 1 | Status: SHIPPED | OUTPATIENT
Start: 2019-07-01 | End: 2021-02-25

## 2019-07-01 NOTE — PATIENT INSTRUCTIONS
For cough:   1) daytime take dextromethorphan (robitussin/dayquil)  2) take with tessalon (Every 8 hours as needed)      Prednisone 40mg a day for 5 days    Azithromycin antibiotic for 5 days    Albuterol 2 puffs every 4 hours as needed for shortness of breath/wheezing  -- use this with your spacer

## 2019-07-01 NOTE — PROGRESS NOTES
Subjective:   Rasheed Zimmerman is a 63 year old male who presents for   Chief Complaint   Patient presents with     Urgent Care     URI     Severe cold, productive cough, wheezing, fatigue. Sx x2 weeks. Pt has asthma. Pt has been taking prednisone and has 2 doses of 20mg- had been taking 40 mg for 5 day then 20mg for 2 days     2 weeks of symptoms - no antibiotic during this time. He has taken some prednisone 40mg for 5 days then 20mg for 2 days. No fevers. Cough is manageable and he is able to sleep with Nyquil. Denies sinus pressure. Albuteorl inhaler 2 puffs about 2-3 times a day. Helps a little. Adherent with is breo elipta.     About to go to trip to [place] and leaves in 2 days.       Patient Active Problem List    Diagnosis Date Noted     Mild intermittent asthma without complication 11/13/2017     Priority: Medium     Cervicalgia 05/30/2017     Priority: Medium     Calculus of kidney 01/10/2016     Priority: Medium     Microscopic hematuria 11/14/2013     Priority: Medium     Advanced directives, counseling/discussion 08/22/2011     Priority: Medium     Advance Directive Problem List Overview:   Name Relationship Phone    Primary Health Care Agent            Alternative Health Care Agent          Discussed advance care planning with patient; information given to patient to review. 8/22/2011        HYPERLIPIDEMIA LDL GOAL <130 10/31/2010     Priority: Medium     Actinic keratosis 12/26/2006     Priority: Medium     Disease of lung 06/01/2006     Priority: Medium     Problem list name updated by automated process. Provider to review       Thoracic or lumbosacral neuritis or radiculitis, unspecified 06/21/2005     Priority: Medium     Sinusitis, chronic 05/17/2004     Priority: Medium     Problem list name updated by automated process. Provider to review         Current Outpatient Medications   Medication     albuterol (2.5 MG/3ML) 0.083% nebulizer solution     albuterol (PROAIR HFA, PROVENTIL HFA, VENTOLIN  HFA) 108 (90 BASE) MCG/ACT inhaler     azithromycin (ZITHROMAX) 250 MG tablet     benzonatate (TESSALON) 100 MG capsule     BREO ELLIPTA 200-25 MCG/INH AEPB     celecoxib (CELEBREX) 200 MG capsule     Ibuprofen (IBU-200 PO)     methylPREDNISolone (MEDROL DOSEPAK) 4 MG tablet therapy pack     montelukast (SINGULAIR) 10 MG tablet     predniSONE (DELTASONE) 20 MG tablet     simvastatin (ZOCOR) 20 MG tablet     VITAMIN D PO     NASONEX 50 MCG/ACT NA SUSP     simvastatin (ZOCOR) 20 MG tablet     No current facility-administered medications for this visit.        ROS:  As above per HPI    Objective:   /72 (BP Location: Right arm, Patient Position: Chair, Cuff Size: Adult Large)   Pulse 77   Temp 98.2  F (36.8  C) (Oral)   SpO2 96% , There is no height or weight on file to calculate BMI.  Gen:  NAD, well-nourished, sitting in chair comfortably  HEENT: EOMI, sclera anicteric, Head normocephalic, ; nares patent; moist mucous membranes  Neck: trachea midline, no thyromegaly  CV:  Hemodynamically stable, RRR  Pulm:  no increased work of breathing , diffuse expiratory wheezes  Extrem: no cyanosis, edema or clubbing  Skin: no obvious rashes or abnormalities  Psych: Euthymic, linear thoughts, normal rate of speech      Assessment & Plan:   Rasheed ZEE Zimmerman, 63 year old male who presents with:  Exacerbation of asthma, unspecified asthma severity, unspecified whether persistent  Patient presenting with exacerbation of his asthma recommending another burst of steroids at 40 mg for 5 more days.  Proceed with azithromycin therapy given the duration of his symptoms and questionable crackles on lung exam although not prominent on this left side.  For cough I am recommending Tessalon with robittusin  - predniSONE (DELTASONE) 20 MG tablet  Dispense: 10 tablet; Refill: 1  - azithromycin (ZITHROMAX) 250 MG tablet  Dispense: 6 tablet; Refill: 0  - benzonatate (TESSALON) 100 MG capsule  Dispense: 21 capsule; Refill: 1      Alden  MD Gelacio   [facility]EDUL[facility]    The use of Dragon/App Press dictation services may have been used to construct the content in this note; any grammatical or spelling errors are non-intentional. Please contact the author of this note directly if you are in need of any clarification.

## 2019-08-19 ENCOUNTER — OFFICE VISIT (OUTPATIENT)
Dept: URGENT CARE | Facility: URGENT CARE | Age: 64
End: 2019-08-19
Payer: COMMERCIAL

## 2019-08-19 VITALS
TEMPERATURE: 99.1 F | BODY MASS INDEX: 31.25 KG/M2 | WEIGHT: 211 LBS | SYSTOLIC BLOOD PRESSURE: 120 MMHG | HEART RATE: 70 BPM | DIASTOLIC BLOOD PRESSURE: 76 MMHG | HEIGHT: 69 IN | OXYGEN SATURATION: 96 %

## 2019-08-19 DIAGNOSIS — S01.311A: Primary | ICD-10-CM

## 2019-08-19 PROCEDURE — 99213 OFFICE O/P EST LOW 20 MIN: CPT | Mod: 25 | Performed by: FAMILY MEDICINE

## 2019-08-19 PROCEDURE — 12013 RPR F/E/E/N/L/M 2.6-5.0 CM: CPT | Performed by: FAMILY MEDICINE

## 2019-08-19 RX ORDER — CEPHALEXIN 500 MG/1
500 CAPSULE ORAL 3 TIMES DAILY
Qty: 30 CAPSULE | Refills: 0 | Status: SHIPPED | OUTPATIENT
Start: 2019-08-19 | End: 2019-08-29

## 2019-08-19 ASSESSMENT — MIFFLIN-ST. JEOR: SCORE: 1742.47

## 2019-08-20 NOTE — PROGRESS NOTES
SUBJECTIVE:     Chief Complaint   Patient presents with     Laceration     right ear cut x 5pm today, , cut while working in the garage, applied bandage, cut on wood, took Ibuprofen at 630pm     Rasheed Zimmerman is a 63 year old male who presents to the clinic with a laceration on the right  pinna sustained 1 hour(s) ago.  This is a non-work related and accidental injury.    Mechanism of injury: hit by a piece of wood- he was breaking wood pieces to go in the garbage.    Associated symptoms: Denies loss of consciousness, vomiting or confusion.  Denies numbness, weakness, or loss of function  Last tetanus booster within 10 years: yes    Past Medical History:   Diagnosis Date     Asthma      Microscopic hematuria 11/14/2013     Other and unspecified hyperlipidemia      Other chronic sinusitis      Polyarthropathy      Patient Active Problem List   Diagnosis     Sinusitis, chronic     Thoracic or lumbosacral neuritis or radiculitis, unspecified     Disease of lung     Actinic keratosis     HYPERLIPIDEMIA LDL GOAL <130     Advanced directives, counseling/discussion     Microscopic hematuria     Calculus of kidney     Cervicalgia     Mild intermittent asthma without complication       ALLERGIES:  No known drug allergies      Current Outpatient Medications on File Prior to Visit:  albuterol (2.5 MG/3ML) 0.083% nebulizer solution Take 1 vial (2.5 mg) by nebulization every 6 hours as needed for shortness of breath / dyspnea or wheezing Take one vial (2.5mg) by Nebulization once for 1 dose -   albuterol (PROAIR HFA, PROVENTIL HFA, VENTOLIN HFA) 108 (90 BASE) MCG/ACT inhaler Inhale 2 puffs into the lungs every 6 hours   BREO ELLIPTA 200-25 MCG/INH AEPB    celecoxib (CELEBREX) 200 MG capsule TK 1 C PO QD PRN   Ibuprofen (IBU-200 PO)    montelukast (SINGULAIR) 10 MG tablet Take 10 mg by mouth At Bedtime   NASONEX 50 MCG/ACT NA SUSP 1 puff each nostril twice daily   simvastatin (ZOCOR) 20 MG tablet TAKE 1 TABLET BY MOUTH EVERY  "NIGHT AT BEDTIME.   VITAMIN D PO as directed    [] azithromycin (ZITHROMAX Z-LIZA) 250 MG tablet Take 2 tablets today then 1 daily times 4 days   [] azithromycin (ZITHROMAX) 250 MG tablet Take 2 tablets (500 mg) by mouth daily for 1 day, THEN 1 tablet (250 mg) daily for 4 days.   benzonatate (TESSALON) 100 MG capsule Take 1 capsule (100 mg) by mouth 3 times daily as needed for cough (Patient not taking: Reported on 2019)   methylPREDNISolone (MEDROL DOSEPAK) 4 MG tablet therapy pack Follow Package Directions (Patient not taking: Reported on 2019)   [] predniSONE (DELTASONE) 20 MG tablet Take 2 tablets (40 mg) by mouth daily for 5 days   simvastatin (ZOCOR) 20 MG tablet Take 1 tablet (20 mg) by mouth At Bedtime (Patient not taking: Reported on 2019)     No current facility-administered medications on file prior to visit.     Social History     Tobacco Use     Smoking status: Never Smoker     Smokeless tobacco: Never Used   Substance Use Topics     Alcohol use: No       Family History   Problem Relation Age of Onset     C.A.D. Paternal Grandfather          86 YOA, [place]     Diabetes Paternal Grandfather      Neurologic Disorder Paternal Grandfather         zenobia gehrig's disease,  at 64 YOA     Alzheimer Disease Paternal Grandmother         alive at 90     Asthma Paternal Grandmother      Asthma Father          ROS:  CONSTITUTIONAL:NEGATIVE for fever, chills,   EYES: NEGATIVE for vision changes or irritation  ENT/MOUTH: NEGATIVE for   mouth and throat problems  RESP:NEGATIVE for significant cough or SOB  GI: NEGATIVE for nausea, abdominal pain, , or change in bowel habits    EXAM:      VITALS: /76 (BP Location: Right arm, Patient Position: Chair, Cuff Size: Adult Regular)   Pulse 70   Temp 99.1  F (37.3  C) (Oral)   Ht 1.753 m (5' 9\")   Wt 95.7 kg (211 lb)   SpO2 96%   BMI 31.16 kg/m    Site of wound:  right   Pinna of ear in the bobbi  Size of laceration: 4 " centimeters  Depth of laceration 4 millimeters- to the level of the cartilege  Characteristics of the laceration: bleeding- mild, clean, straight and diagonal through the bobbi     Sensation to light touch intact: yes           EYES:   EOMI,   conjunctiva clear  HENT:     Nose and lips without  Swelling, ulcers, erythema or lesions  NECK:   normal pain free ROM  RESP:   no labored respirations, no tachypnea  EXTREMITIES:   Full ROM without expression of pain or limitation x 4 extremities  NEURO:   Normal strength and tone, ambulation without difficulty,   normal speech and mentation  SKIN:  no suspicious lesions or rashes  PSYCH:   mentation and affect appears normal and patient appearance--appropriately groomed       Assessment:  Laceration of pinna, right, initial encounter     - cephALEXin (KEFLEX) 500 MG capsule; Take 1 capsule (500 mg) by mouth 3 times daily for 10 days  - OTOLARYNGOLOGY REFERRAL    Discussed that the pinna has increased risk of infection and difficulty healing compared to other areas of the skin.  Also discussed the poor blood flow to the cartilage and risk of deformity with healing.    Given referral for ongoing management with ENT to assure complete healing      :  Patient gave verbal informed consent to have laceration repaired-  Discussed that I tried to do minimal infiltration of the skin with lidocaine of the pinna, to try to prevent separation of the skin from the cartilage        Symptomatic pain relief with acetaminophen and/ or ibuprofen  Patient was advised to monitor for signs of redness, swelling, streaking and have re-evaluation if the wound appears to be worsening  Keep wound clean and dry for the next 24-48 hours  Sutures out in 10 days  Apply anti-bacterial ointment over the wound         PROCEDURE NOTE:         Anesthesia:Wound was locally injected with 2 cc's of Lidocaine 1% plain -  Only injected in the areas of thickest skin - on the convex surfaces, none on the concave  surfaces of the bobbi of the pinna of the right ear  Prepped and draped in the usual sterile fashion  Wound and surrounding skin was cleaned with antiseptic soap and sterile water     Wound was explored for any foreign bodies and evaluated for  vessel  involvement.    Surrounding skin was painted with povidone prior to the closure  Closure was simple  Laceration was closed with 4 X 5.0 Nylon interrupted sutures with good hemostasis  Only sutured convex surfaces, not concave surfaces  Wound and local skin was again cleaned with  Sterile water and dried    Bandage was applied  Patient tolerated the procedure well

## 2019-09-06 ENCOUNTER — TRANSFERRED RECORDS (OUTPATIENT)
Dept: HEALTH INFORMATION MANAGEMENT | Facility: CLINIC | Age: 64
End: 2019-09-06

## 2019-09-30 ENCOUNTER — HEALTH MAINTENANCE LETTER (OUTPATIENT)
Age: 64
End: 2019-09-30

## 2019-12-26 ENCOUNTER — TELEPHONE (OUTPATIENT)
Dept: FAMILY MEDICINE | Facility: CLINIC | Age: 64
End: 2019-12-26

## 2019-12-26 NOTE — TELEPHONE ENCOUNTER
Summary:    Patient is due/failing the following:   COLONOSCOPY and PHYSICAL    Reviewed:  [x] CARE EVERYWHERE  [x] LAST OV NOTE INCLUDING ENDO  [x] FYI TAB  [x] LAST PANEL ENCOUNTER  [x] FUTURE APTS  [x] MYCHART STATUS   [x] IMMUNIZATIONS  Action needed:   Patient needs referral/order: colonoscopy    Type of outreach:    Phone, spoke to patient.  Pt moved not longer will be coming to [facility].                                                                               Juli Cruz

## 2020-01-19 DIAGNOSIS — E78.5 HYPERLIPIDEMIA LDL GOAL <130: ICD-10-CM

## 2020-01-20 RX ORDER — SIMVASTATIN 20 MG
20 TABLET ORAL AT BEDTIME
Qty: 90 TABLET | Refills: 0 | Status: SHIPPED | OUTPATIENT
Start: 2020-01-20 | End: 2021-02-25

## 2020-01-20 NOTE — TELEPHONE ENCOUNTER
"Medication is being filled for 1 time refill only due to:  Patient needs to be seen because it has been more than one year since last visit.   Rin refill given, per [facility] [place] protocol for [facility] sites.    Anaya Egan RN  [facility]      Requested Prescriptions   Pending Prescriptions Disp Refills     simvastatin (ZOCOR) 20 MG tablet [Pharmacy Med Name: SIMVASTATIN 20MG TABLETS] 90 tablet 3     Sig: TAKE 1 TABLET(20 MG) BY MOUTH AT BEDTIME       Statins Protocol Failed - 1/19/2020  3:35 AM        Failed - LDL on file in past 12 months     Recent Labs   Lab Test 01/14/19  1615   LDL 88             Failed - Recent (12 mo) or future (30 days) visit within the authorizing provider's specialty     Patient has had an office visit with the authorizing provider or a provider within the authorizing providers department within the previous 12 mos or has a future within next 30 days. See \"Patient Info\" tab in inbasket, or \"Choose Columns\" in Meds & Orders section of the refill encounter.              Passed - No abnormal creatine kinase in past 12 months     No lab results found.             Passed - Medication is active on med list        Passed - Patient is age 18 or older        Last Written Prescription Date:  1/14/19  Last Fill Quantity: 90,  # refills: 3   Last office visit: 1/14/2019 with prescribing provider:     Future Office Visit:      "

## 2020-03-22 ENCOUNTER — HEALTH MAINTENANCE LETTER (OUTPATIENT)
Age: 65
End: 2020-03-22

## 2021-01-15 ENCOUNTER — HEALTH MAINTENANCE LETTER (OUTPATIENT)
Age: 66
End: 2021-01-15

## 2021-02-25 ENCOUNTER — E-VISIT (OUTPATIENT)
Dept: URGENT CARE | Facility: URGENT CARE | Age: 66
End: 2021-02-25
Payer: COMMERCIAL

## 2021-02-25 ENCOUNTER — OFFICE VISIT (OUTPATIENT)
Dept: URGENT CARE | Facility: URGENT CARE | Age: 66
End: 2021-02-25
Attending: PHYSICIAN ASSISTANT
Payer: COMMERCIAL

## 2021-02-25 DIAGNOSIS — R06.2 WHEEZING: Primary | ICD-10-CM

## 2021-02-25 DIAGNOSIS — Z20.822 SUSPECTED COVID-19 VIRUS INFECTION: ICD-10-CM

## 2021-02-25 LAB
LABORATORY COMMENT REPORT: NORMAL
SARS-COV-2 RNA RESP QL NAA+PROBE: NEGATIVE
SARS-COV-2 RNA RESP QL NAA+PROBE: NORMAL
SPECIMEN SOURCE: NORMAL
SPECIMEN SOURCE: NORMAL

## 2021-02-25 PROCEDURE — 99421 OL DIG E/M SVC 5-10 MIN: CPT | Performed by: PHYSICIAN ASSISTANT

## 2021-02-25 PROCEDURE — U0005 INFEC AGEN DETEC AMPLI PROBE: HCPCS | Performed by: PHYSICIAN ASSISTANT

## 2021-02-25 PROCEDURE — U0003 INFECTIOUS AGENT DETECTION BY NUCLEIC ACID (DNA OR RNA); SEVERE ACUTE RESPIRATORY SYNDROME CORONAVIRUS 2 (SARS-COV-2) (CORONAVIRUS DISEASE [COVID-19]), AMPLIFIED PROBE TECHNIQUE, MAKING USE OF HIGH THROUGHPUT TECHNOLOGIES AS DESCRIBED BY CMS-2020-01-R: HCPCS | Performed by: PHYSICIAN ASSISTANT

## 2021-02-25 RX ORDER — ALBUTEROL SULFATE 90 UG/1
2 AEROSOL, METERED RESPIRATORY (INHALATION) EVERY 6 HOURS
Qty: 18 G | Refills: 0 | Status: SHIPPED | OUTPATIENT
Start: 2021-02-25 | End: 2021-03-21

## 2021-02-25 RX ORDER — ALBUTEROL SULFATE 90 UG/1
2 AEROSOL, METERED RESPIRATORY (INHALATION) EVERY 4 HOURS PRN
Qty: 1 INHALER | Refills: 0 | Status: SHIPPED | OUTPATIENT
Start: 2021-02-25 | End: 2021-02-26

## 2021-02-25 NOTE — PATIENT INSTRUCTIONS
Dear Rasheed Zimmerman,    Your symptoms show that you may have coronavirus (COVID-19). This illness can cause fever, cough and trouble breathing. Many people get a mild case and get better on their own. Some people can get very sick.    Will I be tested for COVID-19?  We would like to test you for Covid-19 virus. I have placed orders for this test.     To schedule: go to your [employer] home page and scroll down to the section that says  You have an appointment that needs to be scheduled  and click the large green button that says  Schedule Now  and follow the steps to find the next available openings.    If you are unable to complete these [employer] scheduling steps, please call [phone] to schedule your testing.     Return to work/school/ guidance:  Please let your workplace manager and staffing office know when your quarantine ends     We can t give you an exact date as it depends on the above. You can calculate this on your own or work with your manager/staffing office to calculate this. (For example if you were exposed on 10/4, you would have to quarantine for 14 full days. That would be through 10/18. You could return on 10/19.)      If you receive a positive COVID-19 test result, follow the guidance of the those who are giving you the results. Usually the return to work is 10 (or in some cases 20 days from symptom onset.) If you work at [employer], you must also be cleared by Employee Occupational Health and Safety to return to work.        If you receive a negative COVID-19 test result and did not have a high risk exposure to someone with a known positive COVID-19 test, you can return to work once you're free of fever for 24 hours without fever-reducing medication and your symptoms are improving or resolved.      If you receive a negative COVID-19 test and If you had a high risk exposure to someone who has tested positive for COVID-19 then you can return to work 14 days after your last contact  with the positive individual    Note: If you have ongoing exposure to the covid positive person, this quarantine period may be more than 14 days. (For example, if you are continued to be exposed to your child who tested positive and cannot isolate from them, then the quarantine of 7-14 days can't start until your child is no longer contagious. This is typically 10 days from onset of the child's symptoms. So the total duration may be 17-24 days in this case.)    Sign up for [employer].   We know it's scary to hear that you might have COVID-19. We want to track your symptoms to make sure you're okay over the next 2 weeks. Please look for an email from [employer]--this is a free, online program that we'll use to keep in touch. To sign up, follow the link in the email you will receive. Learn more at [email or web address]    How can I take care of myself?    Get lots of rest. Drink extra fluids (unless a doctor has told you not to)    Take Tylenol (acetaminophen) or ibuprofen for fever or pain. If you have liver or kidney problems, ask your family doctor if it's okay to take Tylenol o ibuprofen    If you have other health problems (like cancer, heart failure, an organ transplant or severe kidney disease): Call your specialty clinic if you don't feel better in the next 2 days.    Know when to call 911. Emergency warning signs include:  o Trouble breathing or shortness of breath  o Pain or pressure in the chest that doesn't go away  o Feeling confused like you haven't felt before, or not being able to wake up  o Bluish-colored lips or face    Where can I get more information?  [facility] - About COVID-19:   [email or web address]    CDC - What to Do If You're Sick:   www.cdc.gov/coronavirus/2019-ncov/about/steps-when-sick.html    February 25, 2021  RE:  Rasheed Zimmerman                                                                                                                  [id number]  [facility] [address]      To whom it may concern:    I evaluated Rasheed Zimmerman on February 25, 2021. Rasheed Zimmerman should be excused from work/school.     They should let their workplace manager and staffing office know when their quarantine ends.    We can not give an exact date as it depends on the information below. They can calculate this on their own or work with their manager/staffing office to calculate this. (For example if they were exposed on 10/04, they would have to quarantine for 14 full days. That would be through 10/18. They could return on 10/19.)    Quarantine Guidelines:      If patient receives a positive COVID-19 test result, they should follow the guidance of those who are giving the results. Usually the return to work is 10 (or in some cases 20 days from symptom onset.) If they work at [employer], they must be cleared by Employee Occupational Health and Safety to return to work.        If patient receives a negative COVID-19 test result and did not have a high risk exposure to someone with a known positive COVID-19 test, they can return to work once they're free of fever for 24 hours without fever-reducing medication and their symptoms are improving or resolved.      If patient receives a negative COVID-19 test and if they had a high risk exposure to someone who has tested positive for COVID-19 then they can return to work 14 days after their last contact with the positive individual    Note: If there is ongoing exposure to the covid positive person, this quarantine period may be longer than 14 days. (For example, if they are continually exposed to their child, who tested positive and cannot isolate from them, then the quarantine of 7-14 days can't start until their child is no longer contagious. This is typically 10 days from onset to the child's symptoms. So the total duration may be 17-24 days in this case.)     Sincerely,  Kofi Gibson PA-C

## 2021-02-26 ENCOUNTER — OFFICE VISIT (OUTPATIENT)
Dept: FAMILY MEDICINE | Facility: CLINIC | Age: 66
End: 2021-02-26
Payer: COMMERCIAL

## 2021-02-26 VITALS
BODY MASS INDEX: 31.79 KG/M2 | TEMPERATURE: 97.9 F | SYSTOLIC BLOOD PRESSURE: 155 MMHG | HEART RATE: 68 BPM | WEIGHT: 215.3 LBS | DIASTOLIC BLOOD PRESSURE: 97 MMHG

## 2021-02-26 DIAGNOSIS — J45.41 MODERATE PERSISTENT ASTHMA WITH EXACERBATION: ICD-10-CM

## 2021-02-26 DIAGNOSIS — R03.0 ELEVATED BLOOD PRESSURE READING WITHOUT DIAGNOSIS OF HYPERTENSION: ICD-10-CM

## 2021-02-26 DIAGNOSIS — J20.9 ACUTE BRONCHITIS WITH COEXISTING CONDITION REQUIRING PROPHYLACTIC TREATMENT: Primary | ICD-10-CM

## 2021-02-26 DIAGNOSIS — E78.5 HYPERLIPIDEMIA LDL GOAL <130: ICD-10-CM

## 2021-02-26 PROCEDURE — 99214 OFFICE O/P EST MOD 30 MIN: CPT | Performed by: INTERNAL MEDICINE

## 2021-02-26 RX ORDER — PREDNISONE 20 MG/1
40 TABLET ORAL DAILY
Qty: 10 TABLET | Refills: 0 | Status: SHIPPED | OUTPATIENT
Start: 2021-02-26 | End: 2021-03-25

## 2021-02-26 RX ORDER — AZITHROMYCIN 250 MG/1
TABLET, FILM COATED ORAL
Qty: 6 TABLET | Refills: 0 | Status: SHIPPED | OUTPATIENT
Start: 2021-02-26 | End: 2021-03-25

## 2021-02-26 NOTE — PROGRESS NOTES
Patient complains of symptoms 6 days ago, tired, runny nose with yellow phlegm, fatigue, weak.  Sinus pressure, moving into chest now.   Watery eyes, denies shortness of breath.  Cough is waking him up a couple of times per night,     Denies fever, feels chilled sometimes.  Denies chest pain or heaviness on chest.      Patient had negative Covid test yesterday.

## 2021-02-26 NOTE — PROGRESS NOTES
Assessment & Plan     Rasheed was seen today for nasal congestion.    Diagnoses and all orders for this visit:    Acute bronchitis with coexisting condition requiring prophylactic treatment    Moderate persistent asthma with exacerbation  -     predniSONE (DELTASONE) 20 MG tablet; Take 2 tablets (40 mg) by mouth daily  -     azithromycin (ZITHROMAX) 250 MG tablet; 2 tablets on day one, then 1 tablet daily for 4 more days.    Elevated blood pressure reading without diagnosis of hypertension  -     Basic metabolic panel; Future  -     TSH; Future    Hyperlipidemia LDL goal <130  -     Lipid panel reflex to direct LDL Fasting; Future       Acute bronchitis with asthma exacerbation.  Z-Kenneth and prednisone.  He has albuterol inhaler to use on as-needed basis.    Elevated blood pressure: During an acute illness.  We will have him return in 3 weeks for physical and recheck.  Fasting labs were ordered        Return in about 3 weeks (around 3/19/2021) for Physical Exam, Fasting Lab appointment.    Chiara Waterman MD PhD  [facility] ADRIANA Wilkins is a 65 year old who presents for the following health issues     HPI     Patient is new to our clinic.  He has a history of moderate persistent asthma.  In the past has had asthma exacerbation after a URI episode.  Also has history of sinus infection.  He developed respiratory symptoms 5 days ago.  Typical for his sinus pressure infection/asthma exacerbation.  He has had increased mild productive cough with yellowish sputum.  This is typical of asthma exacerbation in the past.  He did do an ED visit yesterday and had Covid tested, which came back negative.  In the past when he has asthma exacerbation he needed to go on prednisone.  He feels his chest is tight now, can use prednisone.    His blood pressure is elevated today.  No prior diagnosis of hypertension.  Patient has history of hyperlipidemia, was on simvastatin in the past, but has not taken it for  over a year.  He has not seen a doctor after he moved to [facility] last year.    Acute Illness  Acute illness concerns:   Onset/Duration: Sunday  Symptoms:  Fever: no  Chills/Sweats: no  Headache (location?): YES frontal sinus headache  Sinus Pressure: YES  Conjunctivitis:  no  Ear Pain: no  Rhinorrhea: YES  Congestion: YES  Sore Throat: YES  Cough: YES-productive of yellow sputum  Wheeze: YES  Decreased Appetite: no  Nausea: no  Vomiting: no  Diarrhea: no  Dysuria/Freq.: no  Dysuria or Hematuria: YES  Fatigue/Achiness: YES  Sick/Strep Exposure: YES  Therapies tried and outcome: albuterol inhaler      Review of Systems   Constitutional, HEENT, cardiovascular, pulmonary, gi and gu systems are negative, except as otherwise noted.      Objective    BP (!) 155/97 (BP Location: Right arm, Patient Position: Sitting, Cuff Size: Adult Regular)   Pulse 68   Temp 97.9  F (36.6  C) (Oral)   Wt 97.7 kg (215 lb 4.8 oz)   BMI 31.79 kg/m    Body mass index is 31.79 kg/m .  Physical Exam   GENERAL: healthy, alert and no distress  EYES: Eyes grossly normal to inspection, PERRL and conjunctivae and sclerae normal  RESP: expiratory wheezes L lower posterior and otherwise clear  CV: regular rate and rhythm, normal S1 S2, no S3 or S4, no murmur, click or rub, no peripheral edema and peripheral pulses strong  PSYCH: mentation appears normal, affect normal/bright    Office Visit on 02/25/2021   Component Date Value Ref Range Status     COVID-19 Virus PCR to [facility] - So* 02/25/2021 Nasopharyngeal   Final     COVID-19 Virus PCR to [facility] - Re* 02/25/2021 Test received-See reflex to [facility] test SARS CoV2 (COVID-19) Virus RT-PCR   Final     SARS-CoV-2 Virus Specimen Source 02/25/2021 Nasopharyngeal   Final     SARS-CoV-2 PCR Result 02/25/2021 NEGATIVE   Final    SARS-CoV2 (COVID-19) RNA not detected, presumed negative.     SARS-CoV-2 PCR Comment 02/25/2021    Final                    Value:Testing was performed using the [employer]  SARS-CoV-2 Assay on the [facility] Instrument System.   Additional information about this Emergency Use Authorization (EUA) assay can be found via   the Lab Guide.      Comment: This test should be ordered for the detection of SARS-CoV-2 in individuals who   meet SARS-CoV-2 clinical and/or epidemiological criteria. Test performance is   unknown in asymptomatic patients.  This test is for in vitro diagnostic use under the FDA EUA for laboratories   certified under CLIA to perform high complexity testing. This test has not   been FDA cleared or approved.  A negative result does not rule out the presence of PCR inhibitors in the   specimen or target RNA in concentration below the limit of detection for the   assay. The possibility of a false negative should be considered if the   patient's recent exposure or clinical presentation suggests COVID-19.  This test was validated by the [facility]ious [facility]   Diagnostic Laboratory. This laboratory is certified under the Clinical   Laboratory Improvement Amendments of 1988 (CLIA-88) as qualified to perform   high complexity laboratory testing.

## 2021-03-11 ENCOUNTER — IMMUNIZATION (OUTPATIENT)
Dept: PEDIATRICS | Facility: CLINIC | Age: 66
End: 2021-03-11
Payer: COMMERCIAL

## 2021-03-11 PROCEDURE — 91300 PR COVID VAC PFIZER DIL RECON 30 MCG/0.3 ML IM: CPT

## 2021-03-11 PROCEDURE — 0001A PR COVID VAC PFIZER DIL RECON 30 MCG/0.3 ML IM: CPT

## 2021-03-12 DIAGNOSIS — R03.0 ELEVATED BLOOD PRESSURE READING WITHOUT DIAGNOSIS OF HYPERTENSION: ICD-10-CM

## 2021-03-12 DIAGNOSIS — E78.5 HYPERLIPIDEMIA LDL GOAL <130: ICD-10-CM

## 2021-03-12 LAB
ANION GAP SERPL CALCULATED.3IONS-SCNC: 6 MMOL/L (ref 3–14)
BUN SERPL-MCNC: 14 MG/DL (ref 7–30)
CALCIUM SERPL-MCNC: 9.1 MG/DL (ref 8.5–10.1)
CHLORIDE SERPL-SCNC: 108 MMOL/L (ref 94–109)
CHOLEST SERPL-MCNC: 243 MG/DL
CO2 SERPL-SCNC: 26 MMOL/L (ref 20–32)
CREAT SERPL-MCNC: 0.95 MG/DL (ref 0.66–1.25)
GFR SERPL CREATININE-BSD FRML MDRD: 84 ML/MIN/{1.73_M2}
GLUCOSE SERPL-MCNC: 83 MG/DL (ref 70–99)
HDLC SERPL-MCNC: 56 MG/DL
LDLC SERPL CALC-MCNC: 163 MG/DL
NONHDLC SERPL-MCNC: 187 MG/DL
POTASSIUM SERPL-SCNC: 3.9 MMOL/L (ref 3.4–5.3)
SODIUM SERPL-SCNC: 140 MMOL/L (ref 133–144)
TRIGL SERPL-MCNC: 119 MG/DL
TSH SERPL DL<=0.005 MIU/L-ACNC: 0.88 MU/L (ref 0.4–4)

## 2021-03-12 PROCEDURE — 80061 LIPID PANEL: CPT | Performed by: INTERNAL MEDICINE

## 2021-03-12 PROCEDURE — 36415 COLL VENOUS BLD VENIPUNCTURE: CPT | Performed by: INTERNAL MEDICINE

## 2021-03-12 PROCEDURE — 80048 BASIC METABOLIC PNL TOTAL CA: CPT | Performed by: INTERNAL MEDICINE

## 2021-03-12 PROCEDURE — 84443 ASSAY THYROID STIM HORMONE: CPT | Performed by: INTERNAL MEDICINE

## 2021-03-13 NOTE — RESULT ENCOUNTER NOTE
Dear Franky,   Your recent test results showed the following:  -- electrolytes panel is normal.  -- lipid panel elevated. We'll discuss these at your visit on 3/25/2021.     Please call or Mychart to our office if you have further questions.     Chiara Waterman MD-PhD

## 2021-03-13 NOTE — RESULT ENCOUNTER NOTE
Dear Franky,   Your recent test result are within acceptable range or at baseline. Please continue with your current plan of care.       Please call or Mychart to our office if you have further questions.     Chiara Waterman MD-PhD

## 2021-03-20 ENCOUNTER — HEALTH MAINTENANCE LETTER (OUTPATIENT)
Age: 66
End: 2021-03-20

## 2021-03-21 DIAGNOSIS — R06.2 WHEEZING: ICD-10-CM

## 2021-03-21 RX ORDER — ALBUTEROL SULFATE 90 UG/1
AEROSOL, METERED RESPIRATORY (INHALATION)
Qty: 18 G | Refills: 0 | Status: SHIPPED | OUTPATIENT
Start: 2021-03-21 | End: 2021-04-27

## 2021-03-25 ENCOUNTER — OFFICE VISIT (OUTPATIENT)
Dept: FAMILY MEDICINE | Facility: CLINIC | Age: 66
End: 2021-03-25
Payer: COMMERCIAL

## 2021-03-25 VITALS
RESPIRATION RATE: 14 BRPM | SYSTOLIC BLOOD PRESSURE: 136 MMHG | HEIGHT: 70 IN | TEMPERATURE: 97.8 F | HEART RATE: 69 BPM | BODY MASS INDEX: 30.78 KG/M2 | OXYGEN SATURATION: 96 % | WEIGHT: 215 LBS | DIASTOLIC BLOOD PRESSURE: 94 MMHG

## 2021-03-25 DIAGNOSIS — Z12.11 SPECIAL SCREENING FOR MALIGNANT NEOPLASMS, COLON: ICD-10-CM

## 2021-03-25 DIAGNOSIS — Z12.5 SCREENING PSA (PROSTATE SPECIFIC ANTIGEN): ICD-10-CM

## 2021-03-25 DIAGNOSIS — Z11.4 SCREENING FOR HIV (HUMAN IMMUNODEFICIENCY VIRUS): ICD-10-CM

## 2021-03-25 DIAGNOSIS — Z12.11 SCREEN FOR COLON CANCER: ICD-10-CM

## 2021-03-25 DIAGNOSIS — E78.5 HYPERLIPIDEMIA LDL GOAL <130: ICD-10-CM

## 2021-03-25 DIAGNOSIS — Z00.00 ROUTINE HISTORY AND PHYSICAL EXAMINATION OF ADULT: Primary | ICD-10-CM

## 2021-03-25 DIAGNOSIS — R03.0 ELEVATED BLOOD PRESSURE READING WITHOUT DIAGNOSIS OF HYPERTENSION: ICD-10-CM

## 2021-03-25 DIAGNOSIS — Z11.4 ENCOUNTER FOR SCREENING FOR HIV: ICD-10-CM

## 2021-03-25 DIAGNOSIS — J45.40 MODERATE PERSISTENT ASTHMA IN ADULT WITHOUT COMPLICATION: ICD-10-CM

## 2021-03-25 PROCEDURE — 99214 OFFICE O/P EST MOD 30 MIN: CPT | Mod: 25 | Performed by: INTERNAL MEDICINE

## 2021-03-25 PROCEDURE — 87389 HIV-1 AG W/HIV-1&-2 AB AG IA: CPT | Performed by: INTERNAL MEDICINE

## 2021-03-25 PROCEDURE — 99397 PER PM REEVAL EST PAT 65+ YR: CPT | Performed by: INTERNAL MEDICINE

## 2021-03-25 PROCEDURE — 36415 COLL VENOUS BLD VENIPUNCTURE: CPT | Performed by: INTERNAL MEDICINE

## 2021-03-25 PROCEDURE — G0103 PSA SCREENING: HCPCS | Performed by: INTERNAL MEDICINE

## 2021-03-25 RX ORDER — SIMVASTATIN 20 MG
20 TABLET ORAL AT BEDTIME
Qty: 90 TABLET | Refills: 3 | Status: SHIPPED | OUTPATIENT
Start: 2021-03-25 | End: 2021-06-24

## 2021-03-25 ASSESSMENT — MIFFLIN-ST. JEOR: SCORE: 1758.54

## 2021-03-25 ASSESSMENT — ACTIVITIES OF DAILY LIVING (ADL): CURRENT_FUNCTION: NO ASSISTANCE NEEDED

## 2021-03-25 NOTE — PROGRESS NOTES
"SUBJECTIVE:   Rasheed Zimmerman is a 65 year old male who presents for Preventive Visit.    HPI:  Patient is new to our healthcare system.  He has a history of moderate persistent asthma, sees a lung specialist.  He is on Breo Ellipta twice a day, albuterol as needed.    History of hyperlipidemia on simvastatin, has been off for some time.    Patient has been advised of split billing requirements and indicates understanding: Yes   Are you in the first 12 months of your Medicare coverage?  No    Healthy Habits:    In general, how would you rate your overall health?  Very good    Frequency of exercise:  2-3 days/week    Duration of exercise:  30-45 minutes    Do you usually eat at least 4 servings of fruit and vegetables a day, include whole grains    & fiber and avoid regularly eating high fat or \"junk\" foods?  No    Taking medications regularly:  No    Barriers to taking medications:  None    Medication side effects:  None    Ability to successfully perform activities of daily living:  No assistance needed    Home Safety:  No safety concerns identified    Hearing Impairment:  No hearing concerns    In the past 6 months, have you been bothered by leaking of urine?  No    In general, how would you rate your overall mental or emotional health?  Very good      PHQ-2 Total Score:    Additional concerns today:  No    Do you feel safe in your environment? Yes    Have you ever done Advance Care Planning? (For example, a Health Directive, POLST, or a discussion with a medical provider or your loved ones about your wishes): No, advance care planning information given to patient to review.  Patient plans to discuss their wishes with loved ones or provider.         Fall risk  Fallen 2 or more times in the past year?: No  Any fall with injury in the past year?: No    Cognitive Screening   1) Repeat 3 items (Leader, Season, Table)    2) Clock draw: NORMAL  3) 3 item recall: Recalls 3 objects  Results: 3 items recalled: COGNITIVE " "IMPAIRMENT LESS LIKELY    Mini-CogTM Copyright S Randy. Licensed by the author for use in [facility]; reprinted with permission (candi@.[facility]). All rights reserved.      Do you have sleep apnea, excessive snoring or daytime drowsiness?: no    Reviewed and updated as needed this visit by clinical staff  Tobacco  Allergies  Meds   Med Hx  Surg Hx  Fam Hx  Soc Hx        Reviewed and updated as needed this visit by Provider                Social History     Tobacco Use     Smoking status: Never Smoker     Smokeless tobacco: Never Used   Substance Use Topics     Alcohol use: No     If you drink alcohol do you typically have >3 drinks per day or >7 drinks per week? No    No flowsheet data found.        -------------------------------------    Current providers sharing in care for this patient include:   Patient Care Team:  Chiara Waterman MD PhD as PCP - General (Internal Medicine - Pediatrics)  Chiara Waterman MD PhD as Assigned PCP    The following health maintenance items are reviewed in Epic and correct as of today:  Health Maintenance Due   Topic Date Due     Pneumococcal Vaccine: Pediatrics (0 to 5 Years) and At-Risk Patients (6 to 64 Years) (1 of 2 - PPSV23) Never done     Pneumococcal Vaccine: 65+ Years (1 of 2 - PPSV23) Never done     ZOSTER IMMUNIZATION (1 of 2) Never done     COLORECTAL CANCER SCREENING  07/29/2019     Labs reviewed in EPIC        Review of Systems  Constitutional, HEENT, cardiovascular, pulmonary, gi and gu systems are negative, except as otherwise noted.    OBJECTIVE:   BP (!) 136/94   Pulse 69   Temp 97.8  F (36.6  C) (Oral)   Resp 14   Ht 1.765 m (5' 9.5\")   Wt 97.5 kg (215 lb)   SpO2 96%   BMI 31.29 kg/m   Estimated body mass index is 31.29 kg/m  as calculated from the following:    Height as of this encounter: 1.765 m (5' 9.5\").    Weight as of this encounter: 97.5 kg (215 lb).  Physical Exam  GENERAL: healthy, alert and no distress  EYES: Eyes grossly normal to " inspection, PERRL and conjunctivae and sclerae normal  HENT: ear canals and TM's normal, nose and mouth without ulcers or lesions  NECK: no adenopathy, no asymmetry, masses, or scars and thyroid normal to palpation  RESP: lungs clear to auscultation - no rales, rhonchi or wheezes  CV: regular rate and rhythm, normal S1 S2, no S3 or S4, no murmur, click or rub, no peripheral edema and peripheral pulses strong  ABDOMEN: soft, nontender, no hepatosplenomegaly, no masses and bowel sounds normal  MS: no gross musculoskeletal defects noted, no edema  SKIN: no suspicious lesions or rashes  NEURO: Normal strength and tone, mentation intact and speech normal  PSYCH: mentation appears normal, affect normal/bright    Diagnostic Test Results:  Results for orders placed or performed in visit on 03/25/21   HIV Antigen Antibody Combo     Status: None   Result Value Ref Range    HIV Antigen Antibody Combo Nonreactive NR^Nonreactive       Prostate spec antigen screen     Status: None   Result Value Ref Range    PSA 1.25 0 - 4 ug/L     Orders Only on 03/12/2021   Component Date Value Ref Range Status     TSH 03/12/2021 0.88  0.40 - 4.00 mU/L Final     Sodium 03/12/2021 140  133 - 144 mmol/L Final     Potassium 03/12/2021 3.9  3.4 - 5.3 mmol/L Final     Chloride 03/12/2021 108  94 - 109 mmol/L Final     Carbon Dioxide 03/12/2021 26  20 - 32 mmol/L Final     Anion Gap 03/12/2021 6  3 - 14 mmol/L Final     Glucose 03/12/2021 83  70 - 99 mg/dL Final    Fasting specimen     Urea Nitrogen 03/12/2021 14  7 - 30 mg/dL Final     Creatinine 03/12/2021 0.95  0.66 - 1.25 mg/dL Final     GFR Estimate 03/12/2021 84  >60 mL/min/[1.73_m2] Final    Comment: Non  GFR Calc  Starting 12/18/2018, serum creatinine based estimated GFR (eGFR) will be   calculated using the Chronic Kidney Disease Epidemiology Collaboration   (CKD-EPI) equation.       GFR Estimate If Black 03/12/2021 >90  >60 mL/min/[1.73_m2] Final    Comment:   GFR Calc  Starting 12/18/2018, serum creatinine based estimated GFR (eGFR) will be   calculated using the Chronic Kidney Disease Epidemiology Collaboration   (CKD-EPI) equation.       Calcium 03/12/2021 9.1  8.5 - 10.1 mg/dL Final     Cholesterol 03/12/2021 243* <200 mg/dL Final    Desirable:       <200 mg/dl     Triglycerides 03/12/2021 119  <150 mg/dL Final    Fasting specimen     HDL Cholesterol 03/12/2021 56  >39 mg/dL Final     LDL Cholesterol Calculated 03/12/2021 163* <100 mg/dL Final    Comment: Above desirable:  100-129 mg/dl  Borderline High:  130-159 mg/dL  High:             160-189 mg/dL  Very high:       >189 mg/dl       Non HDL Cholesterol 03/12/2021 187* <130 mg/dL Final    Comment: Above Desirable:  130-159 mg/dl  Borderline high:  160-189 mg/dl  High:             190-219 mg/dl  Very high:       >219 mg/dl         ASSESSMENT / PLAN:   Rasheed was seen today for physical.    Diagnoses and all orders for this visit:    Routine history and physical examination of adult    Screening for HIV (human immunodeficiency virus)    Screen for colon cancer    Hyperlipidemia LDL goal <130  -    Not at goal. Restarted simvastatin   -  simvastatin (ZOCOR) 20 MG tablet; Take 1 tablet (20 mg) by mouth At Bedtime    Moderate persistent asthma in adult without complication   - doing well. ACT 25. Follows with pulmoanry.     Special screening for malignant neoplasms, colon  -     GASTROENTEROLOGY ADULT REF PROCEDURE ONLY; Future    Encounter for screening for HIV  -     HIV Antigen Antibody Combo    Screening PSA (prostate specific antigen)  -     Prostate spec antigen screen    Elevated blood pressure reading without diagnosis of hypertension   - given DASH diet   - follow up in 3 months.       Return for follow up in 3 months to recheck BP and lipids.     Patient has been advised of split billing requirements and indicates understanding: No  COUNSELING:  Reviewed preventive health counseling, as reflected in patient  "instructions    Estimated body mass index is 31.29 kg/m  as calculated from the following:    Height as of this encounter: 1.765 m (5' 9.5\").    Weight as of this encounter: 97.5 kg (215 lb).    Weight management plan: Discussed healthy diet and exercise guidelines    He reports that he has never smoked. He has never used smokeless tobacco.      Appropriate preventive services were discussed with this patient, including applicable screening as appropriate for cardiovascular disease, diabetes, osteopenia/osteoporosis, and glaucoma.  As appropriate for age/gender, discussed screening for colorectal cancer, prostate cancer, breast cancer, and cervical cancer. Checklist reviewing preventive services available has been given to the patient.    Reviewed patients plan of care and provided an AVS. The Intermediate Care Plan ( asthma action plan, low back pain action plan, and migraine action plan) for Rasheed meets the Care Plan requirement. This Care Plan has been established and reviewed with the Patient.    Counseling Resources:  ATP IV Guidelines  Pooled Cohorts Equation Calculator  Breast Cancer Risk Calculator  Breast Cancer: Medication to Reduce Risk  FRAX Risk Assessment  ICSI Preventive Guidelines  Dietary Guidelines for Americans, 2010  USDA's MyPlate  ASA Prophylaxis  Lung CA Screening    Chiara Waterman MD PhD  [facility]    Identified Health Risks:    "

## 2021-03-26 LAB
HIV 1+2 AB+HIV1 P24 AG SERPL QL IA: NONREACTIVE
PSA SERPL-ACNC: 1.25 UG/L (ref 0–4)

## 2021-03-26 ASSESSMENT — ASTHMA QUESTIONNAIRES: ACT_TOTALSCORE: 25

## 2021-03-29 ENCOUNTER — TELEPHONE (OUTPATIENT)
Dept: FAMILY MEDICINE | Facility: CLINIC | Age: 66
End: 2021-03-29

## 2021-03-29 DIAGNOSIS — Z12.11 SPECIAL SCREENING FOR MALIGNANT NEOPLASMS, COLON: Primary | ICD-10-CM

## 2021-03-29 NOTE — TELEPHONE ENCOUNTER
[facility]    Phone Message    May a detailed message be left on voicemail: yes     Reason for Call: Order(s): Other: Colonoscopy  Reason for requested: Special screening for malignant neoplasms, colon [Z12.11]  Date needed: ASAP  Provider name: Chiara Waterman    Sedation type is missing from the order. Please add to order. Thank you.    Action Taken: Message routed to:  Other: [facility]    Travel Screening: Not Applicable

## 2021-03-30 NOTE — TELEPHONE ENCOUNTER
Called pt and LVM asking him to call back to discuss what type of sedation he needs/requests. I do not see that he has an appt set up for the colonoscopy yet so I assume that the patient called needing this.

## 2021-03-30 NOTE — TELEPHONE ENCOUNTER
Patient states last colonoscopy was 12 years ago, doesn't recall exactly what he had. Remembers being awake then slowly falling asleep for the procedure  Would be ok trying something like prescription for Valium if possible    Nathaly GREENN, RN

## 2021-03-30 NOTE — TELEPHONE ENCOUNTER
No options to choose sedation on the order.  What sedation is being considered or patient requested?

## 2021-04-01 ENCOUNTER — IMMUNIZATION (OUTPATIENT)
Dept: PEDIATRICS | Facility: CLINIC | Age: 66
End: 2021-04-01
Attending: INTERNAL MEDICINE
Payer: COMMERCIAL

## 2021-04-01 PROCEDURE — 91300 PR COVID VAC PFIZER DIL RECON 30 MCG/0.3 ML IM: CPT

## 2021-04-01 PROCEDURE — 0002A PR COVID VAC PFIZER DIL RECON 30 MCG/0.3 ML IM: CPT

## 2021-04-27 DIAGNOSIS — R06.2 WHEEZING: ICD-10-CM

## 2021-04-27 RX ORDER — ALBUTEROL SULFATE 90 UG/1
AEROSOL, METERED RESPIRATORY (INHALATION)
Qty: 18 G | Refills: 0 | Status: SHIPPED | OUTPATIENT
Start: 2021-04-27 | End: 2022-02-10

## 2021-04-30 DIAGNOSIS — Z11.59 ENCOUNTER FOR SCREENING FOR OTHER VIRAL DISEASES: ICD-10-CM

## 2021-05-06 RX ORDER — SODIUM, POTASSIUM,MAG SULFATES 17.5-3.13G
2 SOLUTION, RECONSTITUTED, ORAL ORAL SEE ADMIN INSTRUCTIONS
Qty: 354 ML | Refills: 0 | Status: SHIPPED | OUTPATIENT
Start: 2021-05-06 | End: 2021-06-18

## 2021-05-10 DIAGNOSIS — Z11.59 ENCOUNTER FOR SCREENING FOR OTHER VIRAL DISEASES: ICD-10-CM

## 2021-05-10 LAB
SARS-COV-2 RNA RESP QL NAA+PROBE: NORMAL
SPECIMEN SOURCE: NORMAL

## 2021-05-10 PROCEDURE — U0005 INFEC AGEN DETEC AMPLI PROBE: HCPCS | Performed by: INTERNAL MEDICINE

## 2021-05-10 PROCEDURE — U0003 INFECTIOUS AGENT DETECTION BY NUCLEIC ACID (DNA OR RNA); SEVERE ACUTE RESPIRATORY SYNDROME CORONAVIRUS 2 (SARS-COV-2) (CORONAVIRUS DISEASE [COVID-19]), AMPLIFIED PROBE TECHNIQUE, MAKING USE OF HIGH THROUGHPUT TECHNOLOGIES AS DESCRIBED BY CMS-2020-01-R: HCPCS | Performed by: INTERNAL MEDICINE

## 2021-05-11 LAB
LABORATORY COMMENT REPORT: NORMAL
SARS-COV-2 RNA RESP QL NAA+PROBE: NEGATIVE
SPECIMEN SOURCE: NORMAL

## 2021-05-13 ENCOUNTER — HOSPITAL ENCOUNTER (OUTPATIENT)
Facility: AMBULATORY SURGERY CENTER | Age: 66
Discharge: HOME | End: 2021-05-13
Attending: INTERNAL MEDICINE | Admitting: INTERNAL MEDICINE
Payer: COMMERCIAL

## 2021-05-13 VITALS
TEMPERATURE: 98.5 F | SYSTOLIC BLOOD PRESSURE: 138 MMHG | OXYGEN SATURATION: 98 % | HEART RATE: 63 BPM | RESPIRATION RATE: 16 BRPM | DIASTOLIC BLOOD PRESSURE: 90 MMHG

## 2021-05-13 DIAGNOSIS — Z12.11 COLON CANCER SCREENING: Primary | ICD-10-CM

## 2021-05-13 LAB — COLONOSCOPY: NORMAL

## 2021-05-13 PROCEDURE — G8918 PT W/O PREOP ORDER IV AB PRO: HCPCS

## 2021-05-13 PROCEDURE — 45380 COLONOSCOPY AND BIOPSY: CPT | Mod: XS

## 2021-05-13 PROCEDURE — 45385 COLONOSCOPY W/LESION REMOVAL: CPT

## 2021-05-13 PROCEDURE — 88305 TISSUE EXAM BY PATHOLOGIST: CPT | Mod: GC | Performed by: PATHOLOGY

## 2021-05-13 PROCEDURE — G8907 PT DOC NO EVENTS ON DISCHARG: HCPCS

## 2021-05-13 RX ORDER — ONDANSETRON 2 MG/ML
4 INJECTION INTRAMUSCULAR; INTRAVENOUS EVERY 6 HOURS PRN
Status: DISCONTINUED | OUTPATIENT
Start: 2021-05-13 | End: 2021-05-14 | Stop reason: HOSPADM

## 2021-05-13 RX ORDER — PROCHLORPERAZINE MALEATE 5 MG
5 TABLET ORAL EVERY 6 HOURS PRN
Status: DISCONTINUED | OUTPATIENT
Start: 2021-05-13 | End: 2021-05-14 | Stop reason: HOSPADM

## 2021-05-13 RX ORDER — NALOXONE HYDROCHLORIDE 0.4 MG/ML
0.4 INJECTION, SOLUTION INTRAMUSCULAR; INTRAVENOUS; SUBCUTANEOUS
Status: DISCONTINUED | OUTPATIENT
Start: 2021-05-13 | End: 2021-05-14 | Stop reason: HOSPADM

## 2021-05-13 RX ORDER — ONDANSETRON 4 MG/1
4 TABLET, ORALLY DISINTEGRATING ORAL EVERY 6 HOURS PRN
Status: DISCONTINUED | OUTPATIENT
Start: 2021-05-13 | End: 2021-05-14 | Stop reason: HOSPADM

## 2021-05-13 RX ORDER — NALOXONE HYDROCHLORIDE 0.4 MG/ML
0.2 INJECTION, SOLUTION INTRAMUSCULAR; INTRAVENOUS; SUBCUTANEOUS
Status: DISCONTINUED | OUTPATIENT
Start: 2021-05-13 | End: 2021-05-14 | Stop reason: HOSPADM

## 2021-05-13 RX ORDER — LIDOCAINE 40 MG/G
CREAM TOPICAL
Status: DISCONTINUED | OUTPATIENT
Start: 2021-05-13 | End: 2021-05-14 | Stop reason: HOSPADM

## 2021-05-13 RX ORDER — FENTANYL CITRATE 50 UG/ML
INJECTION, SOLUTION INTRAMUSCULAR; INTRAVENOUS PRN
Status: DISCONTINUED | OUTPATIENT
Start: 2021-05-13 | End: 2021-05-13 | Stop reason: HOSPADM

## 2021-05-13 RX ORDER — FLUMAZENIL 0.1 MG/ML
0.2 INJECTION, SOLUTION INTRAVENOUS
Status: ACTIVE | OUTPATIENT
Start: 2021-05-13 | End: 2021-05-13

## 2021-05-13 RX ORDER — ONDANSETRON 2 MG/ML
4 INJECTION INTRAMUSCULAR; INTRAVENOUS
Status: DISCONTINUED | OUTPATIENT
Start: 2021-05-13 | End: 2021-05-14 | Stop reason: HOSPADM

## 2021-05-17 LAB — COPATH REPORT: NORMAL

## 2021-06-18 ENCOUNTER — OFFICE VISIT (OUTPATIENT)
Dept: FAMILY MEDICINE | Facility: CLINIC | Age: 66
End: 2021-06-18
Payer: COMMERCIAL

## 2021-06-18 VITALS
OXYGEN SATURATION: 96 % | HEART RATE: 72 BPM | SYSTOLIC BLOOD PRESSURE: 132 MMHG | WEIGHT: 206.5 LBS | RESPIRATION RATE: 16 BRPM | BODY MASS INDEX: 30.06 KG/M2 | TEMPERATURE: 97.9 F | DIASTOLIC BLOOD PRESSURE: 84 MMHG

## 2021-06-18 DIAGNOSIS — Z78.9 HISTORY OF MEASLES, MUMPS, RUBELLA (MMR) VACCINATION UNKNOWN: ICD-10-CM

## 2021-06-18 DIAGNOSIS — J45.40 MODERATE PERSISTENT ASTHMA IN ADULT WITHOUT COMPLICATION: ICD-10-CM

## 2021-06-18 DIAGNOSIS — M47.22 OSTEOARTHRITIS OF SPINE WITH RADICULOPATHY, CERVICAL REGION: ICD-10-CM

## 2021-06-18 DIAGNOSIS — E78.5 HYPERLIPIDEMIA LDL GOAL <130: Primary | ICD-10-CM

## 2021-06-18 DIAGNOSIS — Z23 NEED FOR PNEUMOCOCCAL VACCINE: ICD-10-CM

## 2021-06-18 LAB
CHOLEST SERPL-MCNC: 175 MG/DL
HDLC SERPL-MCNC: 51 MG/DL
LDLC SERPL CALC-MCNC: 106 MG/DL
NONHDLC SERPL-MCNC: 124 MG/DL
TRIGL SERPL-MCNC: 90 MG/DL

## 2021-06-18 PROCEDURE — 80061 LIPID PANEL: CPT | Performed by: INTERNAL MEDICINE

## 2021-06-18 PROCEDURE — 90670 PCV13 VACCINE IM: CPT | Performed by: INTERNAL MEDICINE

## 2021-06-18 PROCEDURE — 86735 MUMPS ANTIBODY: CPT | Mod: 90 | Performed by: INTERNAL MEDICINE

## 2021-06-18 PROCEDURE — 90471 IMMUNIZATION ADMIN: CPT | Performed by: INTERNAL MEDICINE

## 2021-06-18 PROCEDURE — 86762 RUBELLA ANTIBODY: CPT | Performed by: INTERNAL MEDICINE

## 2021-06-18 PROCEDURE — 99000 SPECIMEN HANDLING OFFICE-LAB: CPT | Performed by: INTERNAL MEDICINE

## 2021-06-18 PROCEDURE — 86765 RUBEOLA ANTIBODY: CPT | Performed by: INTERNAL MEDICINE

## 2021-06-18 PROCEDURE — 36415 COLL VENOUS BLD VENIPUNCTURE: CPT | Performed by: INTERNAL MEDICINE

## 2021-06-18 PROCEDURE — 99214 OFFICE O/P EST MOD 30 MIN: CPT | Mod: 25 | Performed by: INTERNAL MEDICINE

## 2021-06-18 RX ORDER — CELECOXIB 200 MG/1
200 CAPSULE ORAL DAILY
Qty: 90 CAPSULE | Refills: 3 | Status: SHIPPED | OUTPATIENT
Start: 2021-06-18 | End: 2022-04-12

## 2021-06-18 NOTE — LETTER
My Asthma Action Plan    Name: Rasheed Zimmerman   YOB: 1955  Date: 6/18/2021   My doctor: Chiara Waterman MD PhD   My clinic: [facility]        My Control Medicine: Fluticasone furoate + vilanterol (Breo Ellipta)  -  200/25mcg 1 puff daily  My Rescue Medicine: Albuterol (Proair/Ventolin/Proventil HFA) 2-4 puffs EVERY 4 HOURS as needed. Use a spacer if recommended by your provider.  My Oral Steroid Medicine: none My Asthma Severity:   Moderate Persistent  Know your asthma triggers: upper respiratory infections, dust mites and mold               GREEN ZONE   Good Control    I feel good    No cough or wheeze    Can work, sleep and play without asthma symptoms       Take your asthma control medicine every day.     1. If exercise triggers your asthma, take your rescue medication    15 minutes before exercise or sports, and    During exercise if you have asthma symptoms  2. Spacer to use with inhaler: If you have a spacer, make sure to use it with your inhaler             YELLOW ZONE Getting Worse  I have ANY of these:    I do not feel good    Cough or wheeze    Chest feels tight    Wake up at night   1. Keep taking your Green Zone medications  2. Start taking your rescue medicine:    every 20 minutes for up to 1 hour. Then every 4 hours for 24-48 hours.  3. If you stay in the Yellow Zone for more than 12-24 hours, contact your doctor.  4. If you do not return to the Green Zone in 12-24 hours or you get worse, start taking your oral steroid medicine if prescribed by your provider.           RED ZONE Medical Alert - Get Help  I have ANY of these:    I feel awful    Medicine is not helping    Breathing getting harder    Trouble walking or talking    Nose opens wide to breathe       1. Take your rescue medicine NOW  2. If your provider has prescribed an oral steroid medicine, start taking it NOW  3. Call your doctor NOW  4. If you are still in the Red Zone after 20 minutes and you have not  reached your doctor:    Take your rescue medicine again and    Call 911 or go to the emergency room right away    See your regular doctor within 2 weeks of an Emergency Room or Urgent Care visit for follow-up treatment.          Annual Reminders:  Meet with Asthma Educator,  Flu Shot in the Fall, consider Pneumonia Vaccination for patients with asthma (aged 19 and older).    Pharmacy: [id number] - [facility] [address] AT [facility] [address]    Electronically signed by Chiara Waterman MD PhD   Date: 06/18/21                      Asthma Triggers  How To Control Things That Make Your Asthma Worse    Triggers are things that make your asthma worse.  Look at the list below to help you find your triggers and what you can do about them.  You can help prevent asthma flare-ups by staying away from your triggers.      Trigger                                                          What you can do   Cigarette Smoke  Tobacco smoke can make asthma worse. Do not allow smoking in your home, car or around you.  Be sure no one smokes at a child s day care or school.  If you smoke, ask your health care provider for ways to help you quit.  Ask family members to quit too.  Ask your health care provider for a referral to Quit Plan to help you quit smoking, or call 0-545-392-PLAN.     Colds, Flu, Bronchitis  These are common triggers of asthma. Wash your hands often.  Don t touch your eyes, nose or mouth.  Get a flu shot every year.     Dust Mites  These are tiny bugs that live in cloth or carpet. They are too small to see. Wash sheets and blankets in hot water every week.   Encase pillows and mattress in dust mite proof covers.  Avoid having carpet if you can. If you have carpet, vacuum weekly.   Use a dust mask and HEPA vacuum.   Pollen and Outdoor Mold  Some people are allergic to trees, grass, or weed pollen, or molds. Try to keep your windows closed.  Limit time out doors when pollen count  is high.   Ask you health care provider about taking medicine during allergy season.     Animal Dander  Some people are allergic to skin flakes, urine or saliva from pets with fur or feathers. Keep pets with fur or feathers out of your home.    If you can t keep the pet outdoors, then keep the pet out of your bedroom.  Keep the bedroom door closed.  Keep pets off cloth furniture and away from stuffed toys.     Mice, Rats, and Cockroaches   Some people are allergic to the waste from these pests.   Cover food and garbage.  Clean up spills and food crumbs.  Store grease in the refrigerator.   Keep food out of the bedroom.   Indoor Mold  This can be a trigger if your home has high moisture. Fix leaking faucets, pipes, or other sources of water.   Clean moldy surfaces.  Dehumidify basement if it is damp and smelly.   Smoke, Strong Odors, and Sprays  These can reduce air quality. Stay away from strong odors and sprays, such as perfume, powder, hair spray, paints, smoke incense, paint, cleaning products, candles and new carpet.   Exercise or Sports  Some people with asthma have this trigger. Be active!  Ask your doctor about taking medicine before sports or exercise to prevent symptoms.    Warm up for 5-10 minutes before and after sports or exercise.     Other Triggers of Asthma  Cold air:  Cover your nose and mouth with a scarf.  Sometimes laughing or crying can be a trigger.  Some medicines and food can trigger asthma.

## 2021-06-18 NOTE — PROGRESS NOTES
Assessment & Plan     Rasheed was seen today for lipids.    Diagnoses and all orders for this visit:    Hyperlipidemia LDL goal <130  -     Lipid panel reflex to direct LDL Fasting    Need for pneumococcal vaccine  -     PCV13, IM (6+ WK) - [id number]    History of measles, mumps, rubella (MMR) vaccination unknown  -     Rubeola Antibody IgG  -     Rubella Antibody IgG Quantitative  -     Mumps Immune Status, IgG    Moderate persistent asthma in adult without complication  -     BREO ELLIPTA 200-25 MCG/INH Inhaler; Inhale 1 puff into the lungs daily    Osteoarthritis of spine with radiculopathy, cervical region  Comments:  affecting the right side.   Orders:  -     celecoxib (CELEBREX) 200 MG capsule; Take 1 capsule (200 mg) by mouth daily    Other orders  -     REVIEW OF HEALTH MAINTENANCE PROTOCOL ORDERS      I agree to take over the prescription of Celebrex and Breo Ellipta inhaler.  ACT score is 25 today.  AAP updated.    Check MMR titers.  If positive, does not need another booster dose.    He is eligible for Shingrix.  We will have patient check with his insurance for coverage.  If this is covered, patient will return for nurse visit for Shingrix.    Return in about 9 months (around 3/18/2022) for Physical Exam.    Chiara Waterman MD PhD  [facility]    Manjinder Wilkins is a 65 year old who presents for the following health issues  accompanied by his spouse:    Patient was seen 3 months ago for physical.  At that time he had elevated blood pressure hyperlipidemia.  Simvastatin was started.  He wanted to pursue DASH diet and exercise.  Before starting on blood pressure medication.  He had lost some weight by following a DASH diet.  His blood pressure is now normal.    He has a history of moderate persistent asthma on Breo Ellipta.  His asthma has been completely controlled.  He would like me to take over the refill of Breo.  He does not want to go see his pulmonologist anymore.    He also  sees  For neck arthritis, diagnosed with DJD.  Sometimes he has radiculopathy towards the right arm.  Currently there is no flareup.  He was given Celebrex 200 mg daily.  That has helped control his pain.  He was told that when he has a bad flareup he can take it twice a day.  He has not been needing to take that.  He would like me to take over the refill of Celebrex as well.    He brought in an immunization record to review.  Last DTaP on the record was from 1988.  He indicated that he had 1 dose of MMR.       Hyperlipidemia Follow-Up      Are you regularly taking any medication or supplement to lower your cholesterol?   Yes- simvastatin    Are you having muscle aches or other side effects that you think could be caused by your cholesterol lowering medication?  No      How many servings of fruits and vegetables do you eat daily?  4 or more    On average, how many sweetened beverages do you drink each day (Examples: soda, juice, sweet tea, etc.  Do NOT count diet or artificially sweetened beverages)?   0    How many days per week do you exercise enough to make your heart beat faster? 3 or less    How many minutes a day do you exercise enough to make your heart beat faster? 30 - 60    How many days per week do you miss taking your medication? 0        Review of Systems   Constitutional, HEENT, cardiovascular, pulmonary, gi and gu systems are negative, except as otherwise noted.      Objective    /84   Pulse 72   Temp 97.9  F (36.6  C) (Oral)   Resp 16   Wt 93.7 kg (206 lb 8 oz)   SpO2 96%   BMI 30.06 kg/m    Body mass index is 30.06 kg/m .  Physical Exam   GENERAL: healthy, alert and no distress  PSYCH: mentation appears normal, affect normal/bright    Results for orders placed or performed in visit on 06/18/21 (from the past 24 hour(s))   Lipid panel reflex to direct LDL Fasting   Result Value Ref Range    Cholesterol 175 <200 mg/dL    Triglycerides 90 <150 mg/dL    HDL Cholesterol 51 >39 mg/dL    LDL  Cholesterol Calculated 106 (H) <100 mg/dL    Non HDL Cholesterol 124 <130 mg/dL

## 2021-06-19 ASSESSMENT — ASTHMA QUESTIONNAIRES: ACT_TOTALSCORE: 25

## 2021-06-19 NOTE — RESULT ENCOUNTER NOTE
Dear Franky,   Your recent test results showed the following:  --Lipid panel much improved compared to 2 months ago.  LDL the bad cholesterol is still borderline elevated.  --I recommend you continue with simvastatin at the current dose.  Continue to work on following diet, working on weight loss.  We can recheck next year at your annual physical.    Please call or Mychart to our office if you have further questions.     Chiara Waterman MD-PhD

## 2021-06-21 LAB
MEV IGG SER QL IA: >8 AI (ref 0–0.8)
MUV IGG SER QL IA: 4.9 AI (ref 0–0.8)
RUBV IGG SERPL IA-ACNC: 158 IU/ML

## 2021-06-24 DIAGNOSIS — E78.5 HYPERLIPIDEMIA LDL GOAL <130: ICD-10-CM

## 2021-06-24 DIAGNOSIS — E78.5 HYPERLIPIDEMIA LDL GOAL <100: Primary | ICD-10-CM

## 2021-06-24 RX ORDER — SIMVASTATIN 40 MG
40 TABLET ORAL AT BEDTIME
Qty: 90 TABLET | Refills: 2 | Status: SHIPPED | OUTPATIENT
Start: 2021-06-24 | End: 2022-04-12

## 2021-06-24 NOTE — RESULT ENCOUNTER NOTE
Dear Franky,   Your recent test results showed the following:  -- you have antibody titers for measles mumps and rubella. You do not need booster dose for MMR.     Please call or Mychart to our office if you have further questions.     Chiara Waterman MD-PhD

## 2021-06-24 NOTE — RESULT ENCOUNTER NOTE
Dear Franky,   Your recent test results showed the following:  -- lipid panel improved but the bad cholesterol LDL is still elevated. This puts your 10 year heart risk at 12%, which is considered high. I recommend increasing simvastatin to 40 mg and recheck lipids in 3 months. The goal is to get LDL < 100.     Please call or Mychart to our office if you have further questions.     Chiara Waterman MD-PhD

## 2021-10-24 ENCOUNTER — HEALTH MAINTENANCE LETTER (OUTPATIENT)
Age: 66
End: 2021-10-24

## 2022-02-10 ENCOUNTER — OFFICE VISIT (OUTPATIENT)
Dept: FAMILY MEDICINE | Facility: CLINIC | Age: 67
End: 2022-02-10
Payer: COMMERCIAL

## 2022-02-10 VITALS
HEART RATE: 70 BPM | TEMPERATURE: 97.8 F | OXYGEN SATURATION: 96 % | SYSTOLIC BLOOD PRESSURE: 150 MMHG | HEIGHT: 70 IN | WEIGHT: 214 LBS | BODY MASS INDEX: 30.64 KG/M2 | RESPIRATION RATE: 16 BRPM | DIASTOLIC BLOOD PRESSURE: 98 MMHG

## 2022-02-10 DIAGNOSIS — J45.40 MODERATE PERSISTENT ASTHMA IN ADULT WITHOUT COMPLICATION: Primary | ICD-10-CM

## 2022-02-10 DIAGNOSIS — R05.9 COUGH: ICD-10-CM

## 2022-02-10 DIAGNOSIS — R03.0 ELEVATED BP WITHOUT DIAGNOSIS OF HYPERTENSION: ICD-10-CM

## 2022-02-10 DIAGNOSIS — R06.2 WHEEZING: ICD-10-CM

## 2022-02-10 PROCEDURE — U0003 INFECTIOUS AGENT DETECTION BY NUCLEIC ACID (DNA OR RNA); SEVERE ACUTE RESPIRATORY SYNDROME CORONAVIRUS 2 (SARS-COV-2) (CORONAVIRUS DISEASE [COVID-19]), AMPLIFIED PROBE TECHNIQUE, MAKING USE OF HIGH THROUGHPUT TECHNOLOGIES AS DESCRIBED BY CMS-2020-01-R: HCPCS | Performed by: PHYSICIAN ASSISTANT

## 2022-02-10 PROCEDURE — U0005 INFEC AGEN DETEC AMPLI PROBE: HCPCS | Performed by: PHYSICIAN ASSISTANT

## 2022-02-10 PROCEDURE — 99214 OFFICE O/P EST MOD 30 MIN: CPT | Performed by: PHYSICIAN ASSISTANT

## 2022-02-10 RX ORDER — AZITHROMYCIN 250 MG/1
TABLET, FILM COATED ORAL
Qty: 6 TABLET | Refills: 0 | Status: SHIPPED | OUTPATIENT
Start: 2022-02-10 | End: 2022-02-15

## 2022-02-10 RX ORDER — PREDNISONE 20 MG/1
20 TABLET ORAL 2 TIMES DAILY
Qty: 10 TABLET | Refills: 0 | Status: SHIPPED | OUTPATIENT
Start: 2022-02-10 | End: 2022-02-15

## 2022-02-10 RX ORDER — ALBUTEROL SULFATE 90 UG/1
AEROSOL, METERED RESPIRATORY (INHALATION)
Qty: 18 G | Refills: 1 | Status: SHIPPED | OUTPATIENT
Start: 2022-02-10

## 2022-02-10 ASSESSMENT — MIFFLIN-ST. JEOR: SCORE: 1749.01

## 2022-02-10 ASSESSMENT — ASTHMA QUESTIONNAIRES: ACT_TOTALSCORE: 20

## 2022-02-10 NOTE — PROGRESS NOTES
"Assessment & Plan     1. Moderate persistent asthma in adult without complication    2. Cough    3. Wheezing    4. Elevated BP without diagnosis of hypertension      Screened for COVID today.   Start prednisone and continue albuterol. If COVID test negative start antibiotics. If positive monitor for worsening symptoms.   Patient advised to follow up with PCP to recheck blood pressure when feeling better.              BMI:   Estimated body mass index is 31.15 kg/m  as calculated from the following:    Height as of this encounter: 1.765 m (5' 9.5\").    Weight as of this encounter: 97.1 kg (214 lb).           Return in about 4 weeks (around 3/10/2022) for BP Recheck.    Brittny Adams PA-C  [facility]     Rasheed Zimmerman is a 66 year old male who presents to clinic today for the following health issues accompanied by his none:    HPI     Asthma Follow-Up    Was ACT completed today?    Yes    ACT Total Scores 2/10/2022   ACT TOTAL SCORE -   ASTHMA ER VISITS -   ASTHMA HOSPITALIZATIONS -   ACT TOTAL SCORE (Goal Greater than or Equal to 20) 20   In the past 12 months, how many times did you visit the emergency room for your asthma without being admitted to the hospital? 0   In the past 12 months, how many times were you hospitalized overnight because of your asthma? 0            Please describe any recent triggers for your asthma: perfume and chemicals     Have you had any Emergency Room Visits, Urgent Care Visits, or Hospital Admissions since your last office visit?  No      Acute Illness  Acute illness concerns: cough  Onset/Duration: 3 weeks   Symptoms:  Fever: no  Chills/Sweats: no  Headache (location?): no  Sinus Pressure: YES  Conjunctivitis:  no  Ear Pain: no  Rhinorrhea: no  Congestion: no  Sore Throat: Yes- comes and goes.   Cough: YES-productive of yellow sputum  Wheeze: YEs  Decreased Appetite: no  Nausea: no  Vomiting: no  Diarrhea: no  Dysuria/Freq.: no  Dysuria or " "Hematuria: no  Fatigue/Achiness: YES. Fatigued. No body aches.   Therapies tried and outcome: Dayquil syrup  and cough drops       Patient has asthma.   Using albuterol more the last 3-4 days. Only minimal help.   Has a nebulizer but hasn't used for awhile.   Does use his ellipta daily.     Does better when he is more active. The worst time is at night. Trying to sleep sitting up- triggers cough.   Uses cough drops  Feels tight in his chest- asthma related.       Patient has 2 dose COVID vaccine.   Has not had a covid test. Had COVID 9/2021.         Review of Systems   Constitutional, HEENT, cardiovascular, pulmonary, gi and gu systems are negative, except as otherwise noted.      Objective    BP (!) 150/98   Pulse 70   Temp 97.8  F (36.6  C) (Oral)   Resp 16   Ht 1.765 m (5' 9.5\")   Wt 97.1 kg (214 lb)   SpO2 96%   BMI 31.15 kg/m    Body mass index is 31.15 kg/m .  Physical Exam   GENERAL: healthy, alert and no distress  HENT: ear canals and TM's normal, nose and mouth without ulcers or lesions  NECK: no adenopathy, no asymmetry, masses, or scars and thyroid normal to palpation  RESP: lungs clear to auscultation - no rales, rhonchi or wheezes, dry cough.   CV: regular rate and rhythm, normal S1 S2, no S3 or S4, no murmur,           "

## 2022-02-10 NOTE — PATIENT INSTRUCTIONS
Start prednisone today.   Start zithromax if the COVID testing is negative.     Follow up with PCP on your blood pressure in the next month.

## 2022-02-11 LAB — SARS-COV-2 RNA RESP QL NAA+PROBE: NEGATIVE

## 2022-03-23 ENCOUNTER — ANCILLARY PROCEDURE (OUTPATIENT)
Dept: GENERAL RADIOLOGY | Facility: CLINIC | Age: 67
End: 2022-03-23
Attending: PHYSICIAN ASSISTANT
Payer: COMMERCIAL

## 2022-03-23 ENCOUNTER — OFFICE VISIT (OUTPATIENT)
Dept: URGENT CARE | Facility: URGENT CARE | Age: 67
End: 2022-03-23
Payer: COMMERCIAL

## 2022-03-23 VITALS
BODY MASS INDEX: 30.58 KG/M2 | WEIGHT: 213.6 LBS | TEMPERATURE: 97.6 F | HEART RATE: 66 BPM | DIASTOLIC BLOOD PRESSURE: 90 MMHG | OXYGEN SATURATION: 97 % | SYSTOLIC BLOOD PRESSURE: 142 MMHG | HEIGHT: 70 IN

## 2022-03-23 DIAGNOSIS — S90.851A FOREIGN BODY IN RIGHT FOOT, INITIAL ENCOUNTER: ICD-10-CM

## 2022-03-23 DIAGNOSIS — S90.851A FOREIGN BODY IN RIGHT FOOT, INITIAL ENCOUNTER: Primary | ICD-10-CM

## 2022-03-23 DIAGNOSIS — I10 HYPERTENSION, UNSPECIFIED TYPE: ICD-10-CM

## 2022-03-23 DIAGNOSIS — L02.619 CELLULITIS AND ABSCESS OF FOOT EXCLUDING TOE: ICD-10-CM

## 2022-03-23 DIAGNOSIS — L03.119 CELLULITIS AND ABSCESS OF FOOT EXCLUDING TOE: ICD-10-CM

## 2022-03-23 PROCEDURE — 99213 OFFICE O/P EST LOW 20 MIN: CPT | Mod: 25 | Performed by: PHYSICIAN ASSISTANT

## 2022-03-23 PROCEDURE — 73630 X-RAY EXAM OF FOOT: CPT | Mod: RT | Performed by: RADIOLOGY

## 2022-03-23 PROCEDURE — 87070 CULTURE OTHR SPECIMN AEROBIC: CPT | Performed by: PHYSICIAN ASSISTANT

## 2022-03-23 PROCEDURE — 87077 CULTURE AEROBIC IDENTIFY: CPT | Performed by: PHYSICIAN ASSISTANT

## 2022-03-23 PROCEDURE — 10060 I&D ABSCESS SIMPLE/SINGLE: CPT | Performed by: PHYSICIAN ASSISTANT

## 2022-03-23 RX ORDER — FLUTICASONE PROPIONATE 50 MCG
1 SPRAY, SUSPENSION (ML) NASAL DAILY
COMMUNITY

## 2022-03-23 ASSESSMENT — ENCOUNTER SYMPTOMS
ARTHRALGIAS: 1
NAUSEA: 0
SINUS PRESSURE: 0
EYE PAIN: 0
SINUS PAIN: 0
CONSTITUTIONAL NEGATIVE: 1
COUGH: 0
HEADACHES: 0
ALLERGIC/IMMUNOLOGIC NEGATIVE: 1
EYE DISCHARGE: 0
SORE THROAT: 0
CHILLS: 0
EYE REDNESS: 0
EYE ITCHING: 0
DIARRHEA: 0
NEUROLOGICAL NEGATIVE: 1
MYALGIAS: 0
DYSURIA: 0
FEVER: 0
RESPIRATORY NEGATIVE: 1
RHINORRHEA: 0
VOMITING: 0
ABDOMINAL PAIN: 0
WHEEZING: 0
GASTROINTESTINAL NEGATIVE: 1
PALPITATIONS: 0
SHORTNESS OF BREATH: 0
CHEST TIGHTNESS: 0
HEMATURIA: 0
FREQUENCY: 0
CARDIOVASCULAR NEGATIVE: 1

## 2022-03-23 NOTE — PROGRESS NOTES
"Chief Complaint:    Chief Complaint   Patient presents with     Foreign Body     Right foot          Medical Decision Making:    Vital signs reviewed by Rasheed Mayfield PA-C  BP (!) 142/90   Pulse 66   Temp 97.6  F (36.4  C) (Tympanic)   Ht 1.778 m (5' 10\")   Wt 96.9 kg (213 lb 9.6 oz)   SpO2 97%   BMI 30.65 kg/m      Differential Diagnosis:  Foot abscess, cellulitis, foreign body.     ASSESSMENT:     1. Foreign body in right foot, initial encounter    2. Cellulitis and abscess of foot excluding toe    3. Hypertension, unspecified type           PLAN:    Patient is in no acute distress. VSS. First /102, second /90, still above BP goal.  XR of the R foot was negative for any acute foreign body.      I&D    Verbal consent was obtained.  The area was cleaned with alcohol and anesthetized with 1% lidocaine.  Number 11 blade was used to make a small incision.  Small amount of blood and white purulent material expressed.  Sterile dressing applied.  Patient tolerated this procedure well.  Wound swab collected for culture.    Rx Augmentin for infection of furuncle. Orthopedic referral placed for patient to follow-up for possible foreign body.  Worrisome symptoms discussed with instructions to go to the ED.  Patient verbalized understanding and agreed with this plan.    Labs:     Results for orders placed or performed in visit on 03/23/22   XR Foot Right G/E 3 Views     Status: None    Narrative    EXAM: XR FOOT RIGHT G/E 3 VIEWS  LOCATION: [facility] [place]  DATE/TIME: 3/23/2022 7:18 PM    INDICATION: Right foot pain.  COMPARISON: None.      Impression    IMPRESSION: No fracture or foreign body. Moderate-sized plantar calcaneal spur. Mild degenerative changes throughout the midfoot.       Current Meds:    Current Outpatient Medications:      albuterol (VENTOLIN HFA) 108 (90 Base) MCG/ACT inhaler, INHALE 2 PUFFS INTO THE LUNGS EVERY 6 HOURS, Disp: 18 g, Rfl: 1     " amoxicillin-clavulanate (AUGMENTIN) 875-125 MG tablet, Take 1 tablet by mouth 2 times daily for 7 days, Disp: 14 tablet, Rfl: 0     BREO ELLIPTA 200-25 MCG/INH Inhaler, INHALE 1 PUFF INTO THE LUNGS DAILY, Disp: 28 each, Rfl: 8     celecoxib (CELEBREX) 200 MG capsule, Take 1 capsule (200 mg) by mouth daily, Disp: 90 capsule, Rfl: 3     fluticasone (FLONASE) 50 MCG/ACT nasal spray, Spray 1 spray into both nostrils daily, Disp: , Rfl:      simvastatin (ZOCOR) 40 MG tablet, Take 1 tablet (40 mg) by mouth At Bedtime, Disp: 90 tablet, Rfl: 2     VITAMIN D PO, Take 2,000 Int'l Units/day by mouth  (Patient not taking: Reported on 2/10/2022), Disp: , Rfl:     Allergies:  Allergies   Allergen Reactions     Dust Mites        SUBJECTIVE    HPI: Rasheed Zimmerman is an 66 year old male who presents for evaluation and treatment of a swollen and painful left foot for the last 3 weeks. The patient's wife has drained it a couple of times by sticking a needle in it and allowing for pus to come out. The patient does not know of any acute instance that may have caused this in the first place. The patient would like to know if a sliver needs to be removed. The patient characterizes the pain as an aching pain at rest and a sharp pain when it is touched. The patient rates the pain at a 2/10 right now. Bacitracin, soaked in epsome salts and hydrogen peroxide for treatment. Prior to removing pus the first time, the pain was radiating across his foot and the base of his foot was swaollen as well. The patient is still able to walk on his foot.    ROS:      Review of Systems   Constitutional: Negative.  Negative for chills and fever.   HENT: Negative.  Negative for ear discharge, ear pain, rhinorrhea, sinus pressure, sinus pain and sore throat.    Eyes: Negative for pain, discharge, redness and itching.   Respiratory: Negative.  Negative for cough, chest tightness, shortness of breath and wheezing.    Cardiovascular: Negative.  Negative for  chest pain and palpitations.   Gastrointestinal: Negative.  Negative for abdominal pain, diarrhea, nausea and vomiting.   Genitourinary: Negative for dysuria, frequency, hematuria and urgency.   Musculoskeletal: Positive for arthralgias. Negative for myalgias.   Skin: Negative for rash.   Allergic/Immunologic: Negative.  Negative for immunocompromised state.   Neurological: Negative.  Negative for headaches.        Family History   Family History   Problem Relation Age of Onset     C.A.D. Paternal Grandfather          86 YOA, MI     Diabetes Paternal Grandfather      Neurologic Disorder Paternal Grandfather         zenobia gehrig's disease,  at 64 YOA     Alzheimer Disease Paternal Grandmother         alive at 90     Asthma Paternal Grandmother      Asthma Father      Asthma Son        Social History  Social History     Socioeconomic History     Marital status:      Spouse name: Not on file     Number of children: Not on file     Years of education: Not on file     Highest education level: Not on file   Occupational History     Not on file   Tobacco Use     Smoking status: Never Smoker     Smokeless tobacco: Never Used   Vaping Use     Vaping Use: Never used   Substance and Sexual Activity     Alcohol use: No     Drug use: No     Sexual activity: Yes     Partners: Female   Other Topics Concern     Parent/sibling w/ CABG, MI or angioplasty before 65F 55M? Not Asked   Social History Narrative     Not on file     Social Determinants of Health     Financial Resource Strain: Not on file   Food Insecurity: Not on file   Transportation Needs: Not on file   Physical Activity: Not on file   Stress: Not on file   Social Connections: Not on file   Intimate Partner Violence: Not on file   Housing Stability: Not on file        Surgical History:  Past Surgical History:   Procedure Laterality Date     COLONOSCOPY N/A 2021    Procedure: Colonoscopy, With Polypectomy And Biopsy;  Surgeon: Laurie Garcia,  "DO;  Location: [facility] OR     COLONOSCOPY WITH CO2 INSUFFLATION N/A 5/13/2021    Procedure: COLONOSCOPY, WITH CO2 INSUFFLATION;  Surgeon: Laurie Garcia DO;  Location: [facility] OR     FASCIOTOMY LOWER EXTREMITY      right leg     PHACOEMULSIFICATION CLEAR CORNEA WITH STANDARD INTRAOCULAR LENS IMPLANT  8/23/2012    Procedure: PHACOEMULSIFICATION CLEAR CORNEA WITH STANDARD INTRAOCULAR LENS IMPLANT;  LEFT PHACOEMULSIFICATION CLEAR CORNEA WITH STANDARD INTRAOCULAR LENS IMPLANT ;  Surgeon: Miko Rodríguez MD;  Location: [facility]     PHACOEMULSIFICATION CLEAR CORNEA WITH STANDARD INTRAOCULAR LENS IMPLANT  11/15/2012    Procedure: PHACOEMULSIFICATION CLEAR CORNEA WITH STANDARD INTRAOCULAR LENS IMPLANT;  RIGHT PHACOEMULSIFICATION CLEAR CORNEA WITH STANDARD INTRAOCULAR LENS IMPLANT ;  Surgeon: Miko Rodríguez MD;  Location: [facility]     total knee          Problem List:  Patient Active Problem List   Diagnosis     Sinusitis, chronic     Thoracic or lumbosacral neuritis or radiculitis, unspecified     Moderate persistent asthma in adult without complication     Actinic keratosis     Hyperlipidemia LDL goal <130     Advanced directives, counseling/discussion     Moderate persistent asthma     Microscopic hematuria     Calculus of kidney     Cervicalgia     Mild intermittent asthma without complication           OBJECTIVE:     Vital signs noted and reviewed by Rasheed Mayfield PA-C  BP (!) 142/90   Pulse 66   Temp 97.6  F (36.4  C) (Tympanic)   Ht 1.778 m (5' 10\")   Wt 96.9 kg (213 lb 9.6 oz)   SpO2 97%   BMI 30.65 kg/m       PEFR:    Physical Exam  Vitals and nursing note reviewed.   Constitutional:       General: He is not in acute distress.     Appearance: He is well-developed. He is not ill-appearing, toxic-appearing or diaphoretic.   HENT:      Head: Normocephalic and atraumatic.      Right Ear: Hearing, tympanic membrane, ear canal and external ear normal. Tympanic membrane is not perforated, erythematous, retracted " or bulging.      Left Ear: Hearing, tympanic membrane, ear canal and external ear normal. Tympanic membrane is not perforated, erythematous, retracted or bulging.      Nose: Nose normal. No mucosal edema, congestion or rhinorrhea.      Mouth/Throat:      Pharynx: No oropharyngeal exudate or posterior oropharyngeal erythema.      Tonsils: No tonsillar exudate or tonsillar abscesses. 0 on the right. 0 on the left.   Eyes:      Pupils: Pupils are equal, round, and reactive to light.   Cardiovascular:      Rate and Rhythm: Normal rate and regular rhythm.      Pulses:           Dorsalis pedis pulses are 2+ on the right side and 2+ on the left side.        Posterior tibial pulses are 2+ on the right side and 2+ on the left side.      Heart sounds: Normal heart sounds, S1 normal and S2 normal. Heart sounds not distant. No murmur heard.    No friction rub. No gallop.   Pulmonary:      Effort: Pulmonary effort is normal. No respiratory distress.      Breath sounds: Normal breath sounds. No decreased breath sounds, wheezing, rhonchi or rales.   Abdominal:      General: Bowel sounds are normal. There is no distension.      Palpations: Abdomen is soft.      Tenderness: There is no abdominal tenderness.   Musculoskeletal:      Cervical back: Normal range of motion and neck supple.      Right foot: Normal range of motion. No deformity, bunion, Charcot foot, foot drop or prominent metatarsal heads.      Left foot: Normal range of motion. No deformity, bunion, Charcot foot, foot drop or prominent metatarsal heads.        Feet:    Feet:      Right foot:      Protective Sensation: 3 sites tested. 3 sites sensed.      Skin integrity: Skin integrity normal.      Toenail Condition: Right toenails are abnormally thick.      Left foot:      Protective Sensation: 3 sites tested. 3 sites sensed.      Skin integrity: Skin integrity normal.      Toenail Condition: Left toenails are normal.      Comments: Area of redness and tenderness to  palpation.   Lymphadenopathy:      Cervical: No cervical adenopathy.   Skin:     General: Skin is warm and dry.      Findings: No rash.   Neurological:      Mental Status: He is alert.      Cranial Nerves: No cranial nerve deficit.   Psychiatric:         Attention and Perception: He is attentive.         Speech: Speech normal.         Behavior: Behavior normal. Behavior is cooperative.         Thought Content: Thought content normal.         Judgment: Judgment normal.             Rasheed Mayfield PA-C  3/23/2022, 6:59 PM

## 2022-03-24 NOTE — PATIENT INSTRUCTIONS
Patient Education     Foreign Object Under the Skin (Not Removed)  Very small particles that remain under the skin usually don t cause problems or need further treatment. But sometimes they can cause an infection. Sometimes they work their way to the surface on their own without any problems. If you see this happening, you can remove any particles with tweezers. Be careful not to dig up the skin and make things worse.   You may need to see a surgeon if the object is large and couldn't be removed.. The surgeon can assess the injury and treat it. Sometimes a surgeon uses X-rays or ultrasound to guide them in removing the object.   Home care  Wound care    Keep the wound clean and dry.    If there is a dressing or bandage, change it when it gets wet or dirty. Otherwise, leave it on for the first 24 hours, then change it once a day or as often as you were instructed.    If stitches or staples were used, clean the wound every day:  ? After taking off the dressing, wash the area gently with soap and water.  ? Apply a thin layer of antibiotic ointment to the cut. This will keep the wound clean and make it easier to remove the stitches. If it is oozing a lot, you can put a nonstick dressing over it. Then reapply the bandage or dressing as you were instructed.  ? You can get it wet, just like when you clean it. This means you can shower as usual for the first 24 hours. But don't soak the area in water (no baths or swimming) until the stitches or staples are taken out.    If surgical tape or strips were used, keep the area clean and dry. If it becomes wet, blot it dry with a towel.  Medicine    You can take over-the-counter medicine for pain, unless you were given a different pain medicine to use. Talk with your healthcare provider before using these medicines if you have chronic liver or kidney disease, ever had a stomach ulcer or digestive bleeding, or are taking blood-thinner medicines.    If you were given antibiotics,  take them until they are used up. It's important to finish the antibiotics even if the wound looks better to make sure the infection clears.    Follow-up care  Follow up your healthcare provider, or as advised. Keep in mind the following:     Watch for any signs of infection, such as increasing pain, redness, swelling, or pus drainage. If this happens, don t wait for your scheduled visit. See your healthcare provider sooner.    Stitches or staples are usually taken out within 5 to 14 days. This varies depending on what part of your body they are on, and the type of wound. The healthcare provider will tell you how long they should be left in.    If surgical tape or strips were used, they are usually left on for 7 to 10 days. You can remove them after that unless you were told otherwise. If you try to remove them, and it is too difficult, soaking can help. If the edges of the cut pull apart, then stop removing the tape, and follow up with your healthcare provider.    If X-rays were taken, you will be told if there are new findings that may affect your care.  When to seek medical advice  Call your healthcare provider right away if any of these occur:     Fever of 100.4 F (38 C) or higher, or as directed by your healthcare provider    Increasing pain in the wound    Redness, swelling or pus coming from the wound  [employer] last reviewed this educational content on 8/1/2019 2000-2021 [employer]. All rights reserved. This information is not intended as a substitute for professional medical care. Always follow your healthcare professional's instructions.           Patient Education     Abscess (Incision & Drainage)  An abscess is sometimes called a boil. It happens when bacteria get trapped under the skin and start to grow. Pus forms inside the abscess as the body responds to the bacteria. An abscess can happen with an insect bite, ingrown hair, blocked oil gland, pimple, cyst, or puncture wound.   Your  healthcare provider has drained the pus from your abscess. If the abscess pocket was large, your healthcare provider may have put in gauze packing. Your provider will need to remove or replace it on your next visit. . You may not need antibiotics to treat a simple abscess, unless the infection is spreading into the skin around the wound (cellulitis).   The wound will take about 1 to 2 weeks to heal, depending on the size of the abscess. Healthy tissue will grow from the bottom and sides of the opening until it seals over.   Home care  These tips can help your wound heal:    The wound may drain for the first 2 days. Cover the wound with a clean dry dressing. Change the dressing if it becomes soaked with blood or pus.    If a gauze packing was placed inside the abscess pocket, you may be told to remove it yourself. You may do this in the shower. Once the packing is removed, you should wash the area in the shower, or clean the area as directed by your provider. Continue to do this until the skin opening has closed. Make sure you wash your hands after changing the packing or cleaning the wound.    If you were prescribed antibiotics, take them as directed until they are all gone.    You may use acetaminophen or ibuprofen to control pain, unless another pain medicine was prescribed. If you have liver disease or ever had a stomach ulcer, talk with your healthcare provider before using these medicines.  Follow-up care  Follow up with your healthcare provider, or as advised. If a gauze packing was put in your wound, it should be removed in 1 to 2 days. Check your wound every day for any signs that the infection is getting worse. The signs are listed below.   When to seek medical advice  Call your healthcare provider right away if any of these occur:    Increasing redness or swelling    Red streaks in the skin leading away from the wound    Increasing local pain or swelling    Continued pus draining from the wound 2 days  after treatment    Fever of 100.4 F (38 C) or higher, or as directed by your healthcare provider    Boil returns when you are at home  Sean last reviewed this educational content on 8/1/2019 2000-2021 [employer]. All rights reserved. This information is not intended as a substitute for professional medical care. Always follow your healthcare professional's instructions.

## 2022-03-26 LAB — BACTERIA ABSC ANAEROBE+AEROBE CULT: ABNORMAL

## 2022-04-12 ENCOUNTER — OFFICE VISIT (OUTPATIENT)
Dept: FAMILY MEDICINE | Facility: CLINIC | Age: 67
End: 2022-04-12
Payer: COMMERCIAL

## 2022-04-12 VITALS
BODY MASS INDEX: 29.8 KG/M2 | DIASTOLIC BLOOD PRESSURE: 86 MMHG | RESPIRATION RATE: 20 BRPM | TEMPERATURE: 97 F | HEART RATE: 70 BPM | HEIGHT: 70 IN | SYSTOLIC BLOOD PRESSURE: 134 MMHG | OXYGEN SATURATION: 100 % | WEIGHT: 208.13 LBS

## 2022-04-12 DIAGNOSIS — Z23 NEED FOR SHINGLES VACCINE: ICD-10-CM

## 2022-04-12 DIAGNOSIS — M47.22 OSTEOARTHRITIS OF SPINE WITH RADICULOPATHY, CERVICAL REGION: ICD-10-CM

## 2022-04-12 DIAGNOSIS — J45.40 MODERATE PERSISTENT ASTHMA IN ADULT WITHOUT COMPLICATION: Primary | ICD-10-CM

## 2022-04-12 DIAGNOSIS — Z83.49 FAMILY HISTORY OF HASHIMOTO THYROIDITIS: ICD-10-CM

## 2022-04-12 DIAGNOSIS — Z23 HIGH PRIORITY FOR 2019-NCOV VACCINE: ICD-10-CM

## 2022-04-12 DIAGNOSIS — N20.0 CALCULUS OF KIDNEY: ICD-10-CM

## 2022-04-12 DIAGNOSIS — Z12.5 SCREENING FOR PROSTATE CANCER: ICD-10-CM

## 2022-04-12 DIAGNOSIS — R39.9 LOWER URINARY TRACT SYMPTOMS (LUTS): ICD-10-CM

## 2022-04-12 DIAGNOSIS — E78.5 HYPERLIPIDEMIA LDL GOAL <130: ICD-10-CM

## 2022-04-12 LAB
ALBUMIN UR-MCNC: NEGATIVE MG/DL
ANION GAP SERPL CALCULATED.3IONS-SCNC: 3 MMOL/L (ref 3–14)
APPEARANCE UR: CLEAR
AST SERPL W P-5'-P-CCNC: 19 U/L (ref 0–45)
BACTERIA #/AREA URNS HPF: ABNORMAL /HPF
BILIRUB UR QL STRIP: NEGATIVE
BUN SERPL-MCNC: 14 MG/DL (ref 7–30)
CALCIUM SERPL-MCNC: 8.9 MG/DL (ref 8.5–10.1)
CHLORIDE BLD-SCNC: 106 MMOL/L (ref 94–109)
CHOLEST SERPL-MCNC: 149 MG/DL
CO2 SERPL-SCNC: 27 MMOL/L (ref 20–32)
COLOR UR AUTO: YELLOW
CREAT SERPL-MCNC: 0.79 MG/DL (ref 0.66–1.25)
FASTING STATUS PATIENT QL REPORTED: YES
GFR SERPL CREATININE-BSD FRML MDRD: >90 ML/MIN/1.73M2
GLUCOSE BLD-MCNC: 93 MG/DL (ref 70–99)
GLUCOSE UR STRIP-MCNC: NEGATIVE MG/DL
HDLC SERPL-MCNC: 53 MG/DL
HGB UR QL STRIP: ABNORMAL
KETONES UR STRIP-MCNC: NEGATIVE MG/DL
LDLC SERPL CALC-MCNC: 79 MG/DL
LEUKOCYTE ESTERASE UR QL STRIP: NEGATIVE
MUCOUS THREADS #/AREA URNS LPF: PRESENT /LPF
NITRATE UR QL: NEGATIVE
NONHDLC SERPL-MCNC: 96 MG/DL
PH UR STRIP: 6 [PH] (ref 5–7)
POTASSIUM BLD-SCNC: 4.2 MMOL/L (ref 3.4–5.3)
PSA SERPL-MCNC: 1.26 UG/L (ref 0–4)
RBC #/AREA URNS AUTO: ABNORMAL /HPF
SODIUM SERPL-SCNC: 136 MMOL/L (ref 133–144)
SP GR UR STRIP: 1.02 (ref 1–1.03)
TRIGL SERPL-MCNC: 87 MG/DL
TSH SERPL DL<=0.005 MIU/L-ACNC: 0.64 MU/L (ref 0.4–4)
UROBILINOGEN UR STRIP-ACNC: 0.2 E.U./DL
WBC #/AREA URNS AUTO: ABNORMAL /HPF

## 2022-04-12 PROCEDURE — 81001 URINALYSIS AUTO W/SCOPE: CPT | Performed by: NURSE PRACTITIONER

## 2022-04-12 PROCEDURE — 36415 COLL VENOUS BLD VENIPUNCTURE: CPT | Performed by: NURSE PRACTITIONER

## 2022-04-12 PROCEDURE — 84450 TRANSFERASE (AST) (SGOT): CPT | Performed by: NURSE PRACTITIONER

## 2022-04-12 PROCEDURE — 0054A COVID-19,PF,PFIZER (12+ YRS): CPT | Performed by: NURSE PRACTITIONER

## 2022-04-12 PROCEDURE — 84443 ASSAY THYROID STIM HORMONE: CPT | Performed by: NURSE PRACTITIONER

## 2022-04-12 PROCEDURE — 80061 LIPID PANEL: CPT | Performed by: NURSE PRACTITIONER

## 2022-04-12 PROCEDURE — 90471 IMMUNIZATION ADMIN: CPT | Performed by: NURSE PRACTITIONER

## 2022-04-12 PROCEDURE — 80048 BASIC METABOLIC PNL TOTAL CA: CPT | Performed by: NURSE PRACTITIONER

## 2022-04-12 PROCEDURE — G0103 PSA SCREENING: HCPCS | Performed by: NURSE PRACTITIONER

## 2022-04-12 PROCEDURE — 90750 HZV VACC RECOMBINANT IM: CPT | Performed by: NURSE PRACTITIONER

## 2022-04-12 PROCEDURE — 99214 OFFICE O/P EST MOD 30 MIN: CPT | Mod: 25 | Performed by: NURSE PRACTITIONER

## 2022-04-12 PROCEDURE — 99397 PER PM REEVAL EST PAT 65+ YR: CPT | Mod: 25 | Performed by: NURSE PRACTITIONER

## 2022-04-12 PROCEDURE — 91305 COVID-19,PF,PFIZER (12+ YRS): CPT | Performed by: NURSE PRACTITIONER

## 2022-04-12 RX ORDER — CELECOXIB 200 MG/1
200 CAPSULE ORAL DAILY
Qty: 90 CAPSULE | Refills: 3 | Status: SHIPPED | OUTPATIENT
Start: 2022-04-12 | End: 2023-05-08

## 2022-04-12 ASSESSMENT — PAIN SCALES - GENERAL: PAINLEVEL: NO PAIN (0)

## 2022-04-12 ASSESSMENT — ASTHMA QUESTIONNAIRES: ACT_TOTALSCORE: 25

## 2022-04-12 NOTE — NURSING NOTE
Prior to immunization administration, verified patients identity using patient s name and date of birth. Please see Immunization Activity for additional information.     Screening Questionnaire for Adult Immunization    Are you sick today?   No   Do you have allergies to medications, food, a vaccine component or latex?   No   Have you ever had a serious reaction after receiving a vaccination?   No   Do you have a long-term health problem with heart, lung, kidney, or metabolic disease (e.g., diabetes), asthma, a blood disorder, no spleen, complement component deficiency, a cochlear implant, or a spinal fluid leak?  Are you on long-term aspirin therapy?   No   Do you have cancer, leukemia, HIV/AIDS, or any other immune system problem?   No   Do you have a parent, brother, or sister with an immune system problem?   No   In the past 3 months, have you taken medications that affect  your immune system, such as prednisone, other steroids, or anticancer drugs; drugs for the treatment of rheumatoid arthritis, Crohn s disease, or psoriasis; or have you had radiation treatments?   No   Have you had a seizure, or a brain or other nervous system problem?   No   During the past year, have you received a transfusion of blood or blood    products, or been given immune (gamma) globulin or antiviral drug?   No   For women: Are you pregnant or is there a chance you could become       pregnant during the next month?   No   Have you received any vaccinations in the past 4 weeks?   No     Immunization questionnaire answers were all negative.        Per orders of LOIDA Morton, injection of Shingrix and pfizer booster given by Radha Murrell RN. Patient instructed to remain in clinic for 15 minutes afterwards, and to report any adverse reaction to me immediately.       Screening performed by Radha Murrell RN on 4/12/2022 at 2:12 PM.

## 2022-04-12 NOTE — LETTER
My Asthma Action Plan    Name: Rasheed Zimmerman   YOB: 1955  Date: 4/12/2022   My doctor: NOHEMI Duarte CNP   My clinic: [facility]        My Control Medicine: Fluticasone furoate + vilanterol (Breo Ellipta)  -  200/25mcg one puff daily  My Rescue Medicine: Albuterol (Proair/Ventolin/Proventil HFA) 2-4 puffs EVERY 4 HOURS as needed. Use a spacer if recommended by your provider.  My Oral Steroid Medicine: none My Asthma Severity:   Mild Persistent  Know your asthma triggers: upper respiratory infections  perfume and chemicals             GREEN ZONE   Good Control    I feel good    No cough or wheeze    Can work, sleep and play without asthma symptoms       Take your asthma control medicine every day.     1. If exercise triggers your asthma, take your rescue medication    15 minutes before exercise or sports, and    During exercise if you have asthma symptoms  2. Spacer to use with inhaler: If you have a spacer, make sure to use it with your inhaler             YELLOW ZONE Getting Worse  I have ANY of these:    I do not feel good    Cough or wheeze    Chest feels tight    Wake up at night   1. Keep taking your Green Zone medications  2. Start taking your rescue medicine:    every 20 minutes for up to 1 hour. Then every 4 hours for 24-48 hours.  3. If you stay in the Yellow Zone for more than 12-24 hours, contact your doctor.  4. If you do not return to the Green Zone in 12-24 hours or you get worse, start taking your oral steroid medicine if prescribed by your provider.           RED ZONE Medical Alert - Get Help  I have ANY of these:    I feel awful    Medicine is not helping    Breathing getting harder    Trouble walking or talking    Nose opens wide to breathe       1. Take your rescue medicine NOW  2. If your provider has prescribed an oral steroid medicine, start taking it NOW  3. Call your doctor NOW  4. If you are still in the Red Zone after 20 minutes and  you have not reached your doctor:    Take your rescue medicine again and    Call 911 or go to the emergency room right away    See your regular doctor within 2 weeks of an Emergency Room or Urgent Care visit for follow-up treatment.          Annual Reminders:  Meet with Asthma Educator,  Flu Shot in the Fall, consider Pneumonia Vaccination for patients with asthma (aged 19 and older).    Pharmacy: [id number] - [facility] [address] AT [facility] [address]    Electronically signed by NOHEMI Duarte CNP   Date: 04/12/22                      Asthma Triggers  How To Control Things That Make Your Asthma Worse    Triggers are things that make your asthma worse.  Look at the list below to help you find your triggers and what you can do about them.  You can help prevent asthma flare-ups by staying away from your triggers.      Trigger                                                          What you can do   Cigarette Smoke  Tobacco smoke can make asthma worse. Do not allow smoking in your home, car or around you.  Be sure no one smokes at a child s day care or school.  If you smoke, ask your health care provider for ways to help you quit.  Ask family members to quit too.  Ask your health care provider for a referral to Quit Plan to help you quit smoking, or call 8-632-391-PLAN.     Colds, Flu, Bronchitis  These are common triggers of asthma. Wash your hands often.  Don t touch your eyes, nose or mouth.  Get a flu shot every year.     Dust Mites  These are tiny bugs that live in cloth or carpet. They are too small to see. Wash sheets and blankets in hot water every week.   Encase pillows and mattress in dust mite proof covers.  Avoid having carpet if you can. If you have carpet, vacuum weekly.   Use a dust mask and HEPA vacuum.   Pollen and Outdoor Mold  Some people are allergic to trees, grass, or weed pollen, or molds. Try to keep your windows closed.  Limit time out  doors when pollen count is high.   Ask you health care provider about taking medicine during allergy season.     Animal Dander  Some people are allergic to skin flakes, urine or saliva from pets with fur or feathers. Keep pets with fur or feathers out of your home.    If you can t keep the pet outdoors, then keep the pet out of your bedroom.  Keep the bedroom door closed.  Keep pets off cloth furniture and away from stuffed toys.     Mice, Rats, and Cockroaches   Some people are allergic to the waste from these pests.   Cover food and garbage.  Clean up spills and food crumbs.  Store grease in the refrigerator.   Keep food out of the bedroom.   Indoor Mold  This can be a trigger if your home has high moisture. Fix leaking faucets, pipes, or other sources of water.   Clean moldy surfaces.  Dehumidify basement if it is damp and smelly.   Smoke, Strong Odors, and Sprays  These can reduce air quality. Stay away from strong odors and sprays, such as perfume, powder, hair spray, paints, smoke incense, paint, cleaning products, candles and new carpet.   Exercise or Sports  Some people with asthma have this trigger. Be active!  Ask your doctor about taking medicine before sports or exercise to prevent symptoms.    Warm up for 5-10 minutes before and after sports or exercise.     Other Triggers of Asthma  Cold air:  Cover your nose and mouth with a scarf.  Sometimes laughing or crying can be a trigger.  Some medicines and food can trigger asthma.

## 2022-04-12 NOTE — PROGRESS NOTES
SUBJECTIVE:   CC: Rasheed Zimmerman is an 66 year old male who presents for preventative health visit.       Patient has been advised of split billing requirements and indicates understanding: Yes  Healthy Habits:     Taking medications regularly:  0    PHQ-2 Total Score: 0  History of Present Illness       Reason for visit:  Yearly check up    He eats 0-1 servings of fruits and vegetables daily.He consumes 0 sweetened beverage(s) daily.He exercises with enough effort to increase his heart rate 10 to 19 minutes per day.  He exercises with enough effort to increase his heart rate 3 or less days per week.   He is taking medications regularly.        Hyperlipidemia Follow-Up      Are you regularly taking any medication or supplement to lower your cholesterol?   Yes- Zocor 40 mg daily    Are you having muscle aches or other side effects that you think could be caused by your cholesterol lowering medication?  No    Asthma Follow-Up    Was ACT completed today?    Yes    ACT Total Scores 4/12/2022   ACT TOTAL SCORE -   ASTHMA ER VISITS -   ASTHMA HOSPITALIZATIONS -   ACT TOTAL SCORE (Goal Greater than or Equal to 20) 25   In the past 12 months, how many times did you visit the emergency room for your asthma without being admitted to the hospital? 0   In the past 12 months, how many times were you hospitalized overnight because of your asthma? 0          How many days per week do you miss taking your asthma controller medication?  0    Please describe any recent triggers for your asthma: upper respiratory infections    Have you had any Emergency Room Visits, Urgent Care Visits, or Hospital Admissions since your last office visit?  No    Nocturia- getting up 2-3 times/noc to void, voidingin smaller amounts with feeling of incomplete emptying, no dysuria, or urgency, endorses [facility] ED as well.    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     Today's PHQ-2 Score:   PHQ-2 ( 1999 [employer]) 4/12/2022    Q1: Little interest or pleasure in doing things 0   Q2: Feeling down, depressed or hopeless 0   PHQ-2 Score 0   PHQ-2 Total Score (12-17 Years)- Positive if 3 or more points; Administer PHQ-A if positive -   Q1: Little interest or pleasure in doing things Not at all   Q2: Feeling down, depressed or hopeless Not at all   PHQ-2 Score 0       Abuse: Current or Past(Physical, Sexual or Emotional)- No  Do you feel safe in your environment? Yes        Social History     Tobacco Use     Smoking status: Never Smoker     Smokeless tobacco: Never Used   Substance Use Topics     Alcohol use: No     If you drink alcohol do you typically have >3 drinks per day or >7 drinks per week? No    Alcohol Use 4/12/2022   Prescreen: >3 drinks/day or >7 drinks/week? -   Prescreen: >3 drinks/day or >7 drinks/week? No     Last PSA:   PSA   Date Value Ref Range Status   03/25/2021 1.25 0 - 4 ug/L Final     Comment:     Assay Method:  Chemiluminescence using Siemens Vista analyzer     Prostate Specific Antigen Screen   Date Value Ref Range Status   04/12/2022 1.26 0.00 - 4.00 ug/L Final       Reviewed orders with patient. Reviewed health maintenance and updated orders accordingly - Yes  Labs reviewed in EPIC  BP Readings from Last 3 Encounters:   04/12/22 134/86   03/23/22 (!) 142/90   02/10/22 (!) 150/98    Wt Readings from Last 3 Encounters:   04/12/22 94.4 kg (208 lb 2 oz)   03/23/22 96.9 kg (213 lb 9.6 oz)   02/10/22 97.1 kg (214 lb)                  Patient Active Problem List   Diagnosis     Sinusitis, chronic     Thoracic or lumbosacral neuritis or radiculitis, unspecified     Moderate persistent asthma in adult without complication     Actinic keratosis     Hyperlipidemia LDL goal <130     Advanced directives, counseling/discussion     Moderate persistent asthma     Microscopic hematuria     Calculus of kidney     Cervicalgia     Mild intermittent asthma without complication     Past Surgical History:   Procedure Laterality Date      COLONOSCOPY N/A 2021    Procedure: Colonoscopy, With Polypectomy And Biopsy;  Surgeon: Laurie Garcia DO;  Location: [facility]     COLONOSCOPY WITH CO2 INSUFFLATION N/A 2021    Procedure: COLONOSCOPY, WITH CO2 INSUFFLATION;  Surgeon: Laurie Garcia DO;  Location: [facility]     FASCIOTOMY LOWER EXTREMITY      right leg     PHACOEMULSIFICATION CLEAR CORNEA WITH STANDARD INTRAOCULAR LENS IMPLANT  2012    Procedure: PHACOEMULSIFICATION CLEAR CORNEA WITH STANDARD INTRAOCULAR LENS IMPLANT;  LEFT PHACOEMULSIFICATION CLEAR CORNEA WITH STANDARD INTRAOCULAR LENS IMPLANT ;  Surgeon: Miko Rodríguez MD;  Location:  [facility]     PHACOEMULSIFICATION CLEAR CORNEA WITH STANDARD INTRAOCULAR LENS IMPLANT  11/15/2012    Procedure: PHACOEMULSIFICATION CLEAR CORNEA WITH STANDARD INTRAOCULAR LENS IMPLANT;  RIGHT PHACOEMULSIFICATION CLEAR CORNEA WITH STANDARD INTRAOCULAR LENS IMPLANT ;  Surgeon: Miko Rodríguez MD;  Location:  [facility]     total knee         Social History     Tobacco Use     Smoking status: Never Smoker     Smokeless tobacco: Never Used   Substance Use Topics     Alcohol use: No     Family History   Problem Relation Age of Onset     C.A.D. Paternal Grandfather          86 YOA, [place]     Diabetes Paternal Grandfather      Neurologic Disorder Paternal Grandfather         zenobia gehrig's disease,  at 64 YOA     Alzheimer Disease Paternal Grandmother         alive at 90     Asthma Paternal Grandmother      Asthma Father      Asthma Son            Reviewed and updated as needed this visit by clinical staff   Tobacco  Allergies  Meds  Problems  Med Hx  Surg Hx  Fam Hx  Soc   Hx          Reviewed and updated as needed this visit by Provider   Tobacco  Allergies  Meds  Problems              Past Medical History:   Diagnosis Date     Asthma      Hyperlipidemia      Hypertension      Microscopic hematuria 2013     Other and unspecified hyperlipidemia      Other chronic sinusitis       "Polyarthropathy       Past Surgical History:   Procedure Laterality Date     COLONOSCOPY N/A 5/13/2021    Procedure: Colonoscopy, With Polypectomy And Biopsy;  Surgeon: Laurie Garcia DO;  [facility]: [facility]     COLONOSCOPY WITH CO2 INSUFFLATION N/A 5/13/2021    Procedure: COLONOSCOPY, WITH CO2 INSUFFLATION;  Surgeon: Laurie Garcia DO;  [facility]: [facility]     FASCIOTOMY LOWER EXTREMITY      right leg     PHACOEMULSIFICATION CLEAR CORNEA WITH STANDARD INTRAOCULAR LENS IMPLANT  8/23/2012    Procedure: PHACOEMULSIFICATION CLEAR CORNEA WITH STANDARD INTRAOCULAR LENS IMPLANT;  LEFT PHACOEMULSIFICATION CLEAR CORNEA WITH STANDARD INTRAOCULAR LENS IMPLANT ;  Surgeon: Miko Rodríguez MD;  [facility]     PHACOEMULSIFICATION CLEAR CORNEA WITH STANDARD INTRAOCULAR LENS IMPLANT  11/15/2012    Procedure: PHACOEMULSIFICATION CLEAR CORNEA WITH STANDARD INTRAOCULAR LENS IMPLANT;  RIGHT PHACOEMULSIFICATION CLEAR CORNEA WITH STANDARD INTRAOCULAR LENS IMPLANT ;  Surgeon: Miko Rodríguez MD;  Location: [facility]     total knee         Review of Systems  CONSTITUTIONAL: NEGATIVE for fever, chills, change in weight  INTEGUMENTARY/SKIN: NEGATIVE for worrisome rashes, moles or lesions  EYES: NEGATIVE for vision changes or irritation  ENT: NEGATIVE for ear, mouth and throat problems  RESP: NEGATIVE for significant cough or SOB  CV: NEGATIVE for chest pain, palpitations or peripheral edema  GI: NEGATIVE for nausea, abdominal pain, heartburn, or change in bowel habits   male: negative for dysuria, hematuria, decreased urinary stream, erectile dysfunction, urethral discharge  MUSCULOSKELETAL: NEGATIVE for significant arthralgias or myalgia  NEURO: NEGATIVE for weakness, dizziness or paresthesias  ENDOCRINE: NEGATIVE for temperature intolerance, skin/hair changes  PSYCHIATRIC: NEGATIVE for changes in mood or affect    OBJECTIVE:   /86   Pulse 70   Temp 97  F (36.1  C)   Resp 20   Ht 1.77 m (5' 9.69\")   Wt 94.4 kg " (208 lb 2 oz)   SpO2 100%   BMI 30.13 kg/m      Physical Exam  GENERAL: healthy, alert and no distress  EYES: Eyes grossly normal to inspection, PERRL and conjunctivae and sclerae normal  HENT: ear canals and TM's normal, nose and mouth without ulcers or lesions  NECK: no adenopathy, no asymmetry, masses, or scars and thyroid normal to palpation  RESP: lungs clear to auscultation - no rales, rhonchi or wheezes  CV: regular rate and rhythm, normal S1 S2, no S3 or S4, no murmur, click or rub, no peripheral edema and peripheral pulses strong  ABDOMEN: soft, nontender, no hepatosplenomegaly, no masses and bowel sounds normal   (male): normal male genitalia without lesions or urethral discharge, no hernia  MS: no gross musculoskeletal defects noted, no edema  SKIN: no suspicious lesions or rashes  NEURO: Normal strength and tone, mentation intact and speech normal  PSYCH: mentation appears normal, affect normal/bright  LYMPH: no cervical, supraclavicular, axillary, or inguinal adenopathy    Diagnostic Test Results:  Labs reviewed in Epic  No results found for this or any previous visit (from the past 24 hour(s)).    ASSESSMENT/PLAN:   (J45.40) Moderate persistent asthma in adult without complication  (primary encounter diagnosis)  Comment: STable  Plan: Continue current management    (E78.5) Hyperlipidemia LDL goal <130  Comment:   Plan: Lipid panel reflex to direct LDL Fasting, AST            (N20.0) Calculus of kidney  Comment: checking labs, referring to Urology  Plan: Basic metabolic panel  (Ca, Cl, CO2, Creat,         Gluc, K, Na, BUN), UA Macro with Reflex to         Micro and Culture - lab collect            (Z83.49) Family history of Hashimoto thyroiditis  Comment: Checking TSH, no s/s hypo/hyperthyroidism  Plan: TSH with free T4 reflex            (Z12.5) Screening for prostate cancer  Comment:   Plan: PSA, screen            (Z23) High priority for 2019-nCoV vaccine  Comment:   Plan: COVID-19,PF,PFIZER (12+  "Yrs NARVAEZ LABEL)            (Z23) Need for shingles vaccine  Comment:   Plan: ZOSTER VACCINE RECOMBINANT ADJUVANTED IM NJX              Patient has been advised of split billing requirements and indicates understanding: Yes    COUNSELING:   Reviewed preventive health counseling, as reflected in patient instructions    Estimated body mass index is 30.13 kg/m  as calculated from the following:    Height as of this encounter: 1.77 m (5' 9.69\").    Weight as of this encounter: 94.4 kg (208 lb 2 oz).     Weight management plan: Discussed healthy diet and exercise guidelines    He reports that he has never smoked. He has never used smokeless tobacco.      Counseling Resources:  ATP IV Guidelines  Pooled Cohorts Equation Calculator  FRAX Risk Assessment  ICSI Preventive Guidelines  Dietary Guidelines for Americans, 2010  USDA's MyPlate  ASA Prophylaxis  Lung CA Screening    [facility]  "

## 2022-04-21 NOTE — PROGRESS NOTES
Patient unable to connect for video visit despite multiple attempts made through [employer] and [employer]. Appointment converted to telephone.    Name: Rasheed Zimmerman    MRN: [id number]   YOB: 1955                 Chief Complaint:   Lower urinary tract symptoms         Assessment and Plan:   66 year old male with LUTS characterized by nocturia, slow stream, and urinary hesitancy. Unable to complete LIEN today given the nature of the virtual visit, though suspect BPH may be a likely culprit. We therefore discussed trial of alpha blocker therapy to which he is amenable.  -Start alfuzosin 10 mg once daily. Side effects discussed.  -Limit fluids before bed.  -Follow up in 6-8 weeks to recheck. If no improvement, consider further evaluation with voiding diaries, PVR, exam.     Kate Romero PA-C  April 22, 2022          History of Present Illness:   Rasheed Zimmerman is a 66 year old male with PMH of nephrolithiasis seen today via virtual visit in consultation from NOHEMI Delacruz, CNP for evaluation of lower urinary tract symptoms. Franky describes nocturia 4-5 times per night, slow stream, some urinary hesitancy, and feeling of incomplete bladder emptying. Daytime symptoms are less bothersome. He started taking an OTC prostate supplement (Prostate Support) which helps somewhat, though he is still up 3 times per night. He denies issues related to snoring, sleep apnea, or lower extremity edema.    Reports that his dad has some prostate problems, but no known family history of prostate cancer.         Past Medical History:     Past Medical History:   Diagnosis Date     Asthma      Hyperlipidemia      Hypertension      Microscopic hematuria 11/14/2013     Other and unspecified hyperlipidemia      Other chronic sinusitis      Polyarthropathy             Past Surgical History:     Past Surgical History:   Procedure Laterality Date     COLONOSCOPY N/A 5/13/2021    Procedure: Colonoscopy, With Polypectomy And  Biopsy;  Surgeon: Laurie Garcia DO;  [facility]: [facility]     COLONOSCOPY WITH CO2 INSUFFLATION N/A 2021    Procedure: COLONOSCOPY, WITH CO2 INSUFFLATION;  Surgeon: Laurie Garcia DO;  Location: [facility]     FASCIOTOMY LOWER EXTREMITY      right leg     PHACOEMULSIFICATION CLEAR CORNEA WITH STANDARD INTRAOCULAR LENS IMPLANT  2012    Procedure: PHACOEMULSIFICATION CLEAR CORNEA WITH STANDARD INTRAOCULAR LENS IMPLANT;  LEFT PHACOEMULSIFICATION CLEAR CORNEA WITH STANDARD INTRAOCULAR LENS IMPLANT ;  Surgeon: Miko Rodríguez MD;  Location:  [facility]     PHACOEMULSIFICATION CLEAR CORNEA WITH STANDARD INTRAOCULAR LENS IMPLANT  11/15/2012    Procedure: PHACOEMULSIFICATION CLEAR CORNEA WITH STANDARD INTRAOCULAR LENS IMPLANT;  RIGHT PHACOEMULSIFICATION CLEAR CORNEA WITH STANDARD INTRAOCULAR LENS IMPLANT ;  Surgeon: Miko Rodríguez MD;  Location:  [facility]     total knee              Social History:     Social History     Tobacco Use     Smoking status: Never Smoker     Smokeless tobacco: Never Used   Substance Use Topics     Alcohol use: No            Family History:     Family History   Problem Relation Age of Onset     C.A.D. Paternal Grandfather          86 YOA, [place]     Diabetes Paternal Grandfather      Neurologic Disorder Paternal Grandfather         zenobia gehrig's disease,  at 64 YOA     Alzheimer Disease Paternal Grandmother         alive at 90     Asthma Paternal Grandmother      Asthma Father      Asthma Son             Allergies:     Allergies   Allergen Reactions     Dust Mites             Medications:     Current Outpatient Medications   Medication Sig     albuterol (VENTOLIN HFA) 108 (90 Base) MCG/ACT inhaler INHALE 2 PUFFS INTO THE LUNGS EVERY 6 HOURS     BREO ELLIPTA 200-25 MCG/INH Inhaler Inhale 1 puff into the lungs in the morning.     celecoxib (CELEBREX) 200 MG capsule Take 1 capsule (200 mg) by mouth in the morning.     fluticasone (FLONASE) 50 MCG/ACT nasal spray Spray 1 spray  into both nostrils daily     simvastatin (ZOCOR) 40 MG tablet TAKE 1 TABLET(40 MG) BY MOUTH AT BEDTIME     VITAMIN D PO Take 2,000 Int'l Units/day by mouth     No current facility-administered medications for this visit.             Review of Systems:    ROS: 14 point ROS neg other than the symptoms noted above in the HPI and PMH.       Labs:      PSA   Date Value Ref Range Status   03/25/2021 1.25 0 - 4 ug/L Final     Comment:     Assay Method:  Chemiluminescence using Siemens Vista analyzer     Prostate Specific Antigen Screen   Date Value Ref Range Status   04/12/2022 1.26 0.00 - 4.00 ug/L Final       4/12/22 - UA: trace blood, 0-2 RBC    Creatinine   Date Value Ref Range Status   04/12/2022 0.79 0.66 - 1.25 mg/dL Final   03/12/2021 0.95 0.66 - 1.25 mg/dL Final         Imaging:    CT ABDOMEN PELVIS W/O CONTRAST 1/10/2016     FINDINGS:   Abdomen and Pelvis:   There left perinephric stranding and small perinephric fluid with mild  left hydronephrosis and hydroureter secondary to a 0.4 cm stone at the  left ureterovesical junction. There is some periureteral stranding.  There is a 0.2 cm nonobstructing intrarenal left kidney stone.     No evidence for right sided hydronephrosis or urinary tract calculi.  Bladder is unremarkable.     Discoid atelectasis medial right lung base. There are multiple small  indeterminate nodules at the lower lobes. A lateral left lower lobe  nodule on series 2 image 8, 0.6 cm in size is unchanged since  5/23/2006 CT abdomen and pelvis compatible with a radiographically  benign finding. Additional pulmonary nodules were not included on the  prior CT with 4 additional nodules at the peripheral left lower lobe  the largest 0.8 cm. Couple tiny peripheral nodules at the right lung  base.     A 3 x 1.8 cm low dense lesion in the superior liver has increased in  size, previously 2.1 cm in size. Not completely characterized but may  be a cyst. No new liver lesions, the gallbladder, spleen,  pancreas and  adrenal glands appear normal as seen with CT technique. No periaortic  or pelvic adenopathy. Few scattered colonic diverticula. Normal  appendix. No acute all abnormality.     IMPRESSION  IMPRESSION:  1. 0.4 cm left ureterovesical junction stone with left hydronephrosis  and hydroureter.  2. 0.2 cm left intrarenal kidney stone.  3. Multiple lung nodules at the lung bases under 1 cm in size. Patient  had a prior 9/29/2006 chest CT. The largest 0.8 cm left lower lobe  nodule measured 0.5 cm on the prior chest CT but was present in 2006.  The additional smaller nodules were also present in 2006 as well as a  tiny subpleural right lower lobe nodular densities. The apparent  increase in size of the 0.8 cm left lower lobe nodule could be  artifactual due to differences in slice positioning between exams but  is difficult to exclude minimal growth of this nodule and 12 month  followup CT recommended unless there is another outside chest CT for  comparison.         30 minutes spent on the date of the encounter, doing pre-visit chart review, reviewing and interpreting lab/imaging results, ordering prescriptions, and documenting, including 16 minutes spent on the phone with the patient.

## 2022-04-22 ENCOUNTER — VIRTUAL VISIT (OUTPATIENT)
Dept: UROLOGY | Facility: CLINIC | Age: 67
End: 2022-04-22
Attending: NURSE PRACTITIONER
Payer: COMMERCIAL

## 2022-04-22 DIAGNOSIS — R39.9 LOWER URINARY TRACT SYMPTOMS (LUTS): ICD-10-CM

## 2022-04-22 PROCEDURE — 99203 OFFICE O/P NEW LOW 30 MIN: CPT | Mod: GT | Performed by: PHYSICIAN ASSISTANT

## 2022-04-22 RX ORDER — ALFUZOSIN HYDROCHLORIDE 10 MG/1
10 TABLET, EXTENDED RELEASE ORAL DAILY
Qty: 30 TABLET | Refills: 11 | Status: SHIPPED | OUTPATIENT
Start: 2022-04-22 | End: 2023-04-13

## 2022-04-22 NOTE — PROGRESS NOTES
Rasheed Zimmerman  who is being evaluated via a billable video visit.      How would you like to obtain your AVS? MyChart  If the video visit is dropped, the invitation should be resent by: Text to cell phone: [phone]  Will anyone else be joining your video visit? No

## 2022-10-16 ENCOUNTER — HEALTH MAINTENANCE LETTER (OUTPATIENT)
Age: 67
End: 2022-10-16

## 2023-01-03 DIAGNOSIS — E78.5 HYPERLIPIDEMIA LDL GOAL <130: ICD-10-CM

## 2023-01-03 RX ORDER — SIMVASTATIN 40 MG
TABLET ORAL
Qty: 90 TABLET | Refills: 0 | Status: SHIPPED | OUTPATIENT
Start: 2023-01-03 | End: 2023-03-13

## 2023-01-10 DIAGNOSIS — J45.40 MODERATE PERSISTENT ASTHMA IN ADULT WITHOUT COMPLICATION: ICD-10-CM

## 2023-01-14 ENCOUNTER — TELEPHONE (OUTPATIENT)
Dept: FAMILY MEDICINE | Facility: CLINIC | Age: 68
End: 2023-01-14
Payer: COMMERCIAL

## 2023-01-14 RX ORDER — FLUTICASONE FUROATE AND VILANTEROL TRIFENATATE 200; 25 UG/1; UG/1
POWDER RESPIRATORY (INHALATION)
Qty: 1 EACH | Refills: 0 | Status: SHIPPED | OUTPATIENT
Start: 2023-01-14 | End: 2023-06-02

## 2023-01-14 NOTE — TELEPHONE ENCOUNTER
Plan does not cover this medication. Please call plan at [phone] to initiate prior authorization or call/fax pharmacy to change medication at [phone]. Patient ID # is [id number].          [facility]

## 2023-01-18 NOTE — TELEPHONE ENCOUNTER
Central Prior Authorization Team   Phone: [phone]    PA Initiation    Medication: BREO ELLIPTA 200-25 MCG/ACT inhaler  Insurance Company:    Pharmacy Filling the Rx: [id number] [place], [place] - [facility] [facility] [address]  Filling Pharmacy Phone: [phone]  Filling Pharmacy Fax: [phone]  Start Date: 1/18/2023

## 2023-01-19 NOTE — TELEPHONE ENCOUNTER
PA not needed. Insurance prefers brand name.  Pharmacy processed through insurance and patient picked up 1/9/2023.

## 2023-04-13 DIAGNOSIS — R39.9 LOWER URINARY TRACT SYMPTOMS (LUTS): ICD-10-CM

## 2023-04-13 RX ORDER — ALFUZOSIN HYDROCHLORIDE 10 MG/1
10 TABLET, EXTENDED RELEASE ORAL DAILY
Qty: 30 TABLET | Refills: 0 | Status: SHIPPED | OUTPATIENT
Start: 2023-04-13 | End: 2023-05-19

## 2023-04-13 NOTE — TELEPHONE ENCOUNTER
alfuzosin ER (UROXATRAL) 10 MG 24 hr tablet      Last Written Prescription Date:  4/22/22  Last Fill Quantity: 30,   # refills: 11  Last Office Visit : 4/22/22  Future Office visit:  5/19/23    Routing refill request to provider for review/approval because:  Blood pressure out of range       BP Readings from Last 3 Encounters:   04/12/22 134/86   03/23/22 (!) 142/90   02/10/22 (!) 150/98

## 2023-05-18 NOTE — PROGRESS NOTES
Name: Rasheed Zimmerman    MRN: [id number]   YOB: 1955                 Chief Complaint:   Medication refill         Assessment and Plan:   67 year old male with LUTS characterized by nocturia, slow stream, and urinary hesitancy, improved with alfuzosin. Will continue with the same.     -Continue alfuzosin 10 mg once daily. Refilled.  -Continue to limit fluids before bed.  -Due for annual PSA which he plans to have done as part of annual physical in early June.  -If PCP willing to take over refilling alfuzosin, he can follow up with urology as needed.    Kate Romero PA-C  May 19, 2023          History of Present Illness:   Rasheed Zimmerman is a 67 year old male with PMH of nephrolithiasis seen today via virtual visit for med refills. He has a history of LUTS, primarily nocturia 4-5 times per night, slow stream, some urinary hesitancy, and feeling of incomplete bladder emptying. Daytime symptoms are less bothersome. In early 2022, he started taking an OTC prostate supplement (Prostate Support) which helps somewhat, though he is still up 3 times per night. He denies issues related to snoring, sleep apnea, or lower extremity edema. Reports that his dad has some prostate problems, but no known family history of prostate cancer.    We started him on alfuzosin 10 mg daily on 4/22/2022.     TODAY   5/19/23:  Franky reports significant improvement. He is now waking up 0-1 times overnight to urinate. Tolerating the medication without side effects.         Past Medical History:     Past Medical History:   Diagnosis Date     Asthma      Hyperlipidemia      Hypertension      Microscopic hematuria 11/14/2013     Other and unspecified hyperlipidemia      Other chronic sinusitis      Polyarthropathy             Past Surgical History:     Past Surgical History:   Procedure Laterality Date     COLONOSCOPY N/A 5/13/2021    Procedure: Colonoscopy, With Polypectomy And Biopsy;  Surgeon: Laurie Garcia DO;   Location: [facility]     COLONOSCOPY WITH CO2 INSUFFLATION N/A 2021    Procedure: COLONOSCOPY, WITH CO2 INSUFFLATION;  Surgeon: Laurie Garcia DO;  Location: [facility]     FASCIOTOMY LOWER EXTREMITY      right leg     PHACOEMULSIFICATION CLEAR CORNEA WITH STANDARD INTRAOCULAR LENS IMPLANT  2012    Procedure: PHACOEMULSIFICATION CLEAR CORNEA WITH STANDARD INTRAOCULAR LENS IMPLANT;  LEFT PHACOEMULSIFICATION CLEAR CORNEA WITH STANDARD INTRAOCULAR LENS IMPLANT ;  Surgeon: Miko Rodríguez MD;  Location:  [facility]     PHACOEMULSIFICATION CLEAR CORNEA WITH STANDARD INTRAOCULAR LENS IMPLANT  11/15/2012    Procedure: PHACOEMULSIFICATION CLEAR CORNEA WITH STANDARD INTRAOCULAR LENS IMPLANT;  RIGHT PHACOEMULSIFICATION CLEAR CORNEA WITH STANDARD INTRAOCULAR LENS IMPLANT ;  Surgeon: Miko Rodríguez MD;  Location:  [facility]     total knee              Social History:     Social History     Tobacco Use     Smoking status: Never     Smokeless tobacco: Never   Vaping Use     Vaping status: Never Used     Passive vaping exposure: Yes   Substance Use Topics     Alcohol use: No            Family History:     Family History   Problem Relation Age of Onset     C.A.D. Paternal Grandfather          86 YOA, [place]     Diabetes Paternal Grandfather      Neurologic Disorder Paternal Grandfather         zenobia gehrig's disease,  at 64 YOA     Alzheimer Disease Paternal Grandmother         alive at 90     Asthma Paternal Grandmother      Asthma Father      Asthma Son             Allergies:     Allergies   Allergen Reactions     Dust Mites             Medications:     Current Outpatient Medications   Medication Sig     albuterol (VENTOLIN HFA) 108 (90 Base) MCG/ACT inhaler INHALE 2 PUFFS INTO THE LUNGS EVERY 6 HOURS     alfuzosin ER (UROXATRAL) 10 MG 24 hr tablet Take 1 tablet (10 mg) by mouth daily     BREO ELLIPTA 200-25 MCG/ACT inhaler INHALE 1 PUFF INTO THE LUNGS IN THE MORNING     celecoxib (CELEBREX) 200 MG capsule TAKE 1  CAPSULE(200 MG) BY MOUTH IN THE MORNING     fluticasone (FLONASE) 50 MCG/ACT nasal spray Spray 1 spray into both nostrils daily     simvastatin (ZOCOR) 40 MG tablet TAKE 1 TABLET(40 MG) BY MOUTH AT BEDTIME     VITAMIN D PO Take 2,000 Int'l Units/day by mouth     No current facility-administered medications for this visit.             Review of Systems:    ROS: 14 point ROS neg other than the symptoms noted above in the HPI and PMH.          Physical Exam:   GENERAL: Healthy, alert and no distress  EYES: Eyes grossly normal to inspection.  No discharge or erythema, or obvious scleral/conjunctival abnormalities.  RESP: No audible wheeze, cough, or visible cyanosis.  No visible retractions or increased work of breathing.    SKIN: Visible skin clear. No significant rash, abnormal pigmentation or lesions.  NEURO: Cranial nerves grossly intact.  Mentation and speech appropriate for age.  PSYCH: Mentation appears normal, affect normal/bright, judgement and insight intact, normal speech and appearance well-groomed.       Labs:      PSA   Date Value Ref Range Status   03/25/2021 1.25 0 - 4 ug/L Final     Comment:     Assay Method:  Chemiluminescence using Siemens Vista analyzer     Prostate Specific Antigen Screen   Date Value Ref Range Status   04/12/2022 1.26 0.00 - 4.00 ug/L Final       4/12/22 - UA: trace blood, 0-2 RBC    Creatinine   Date Value Ref Range Status   04/12/2022 0.79 0.66 - 1.25 mg/dL Final   03/12/2021 0.95 0.66 - 1.25 mg/dL Final         Imaging:    CT ABDOMEN PELVIS W/O CONTRAST 1/10/2016     FINDINGS:   Abdomen and Pelvis:   There left perinephric stranding and small perinephric fluid with mild  left hydronephrosis and hydroureter secondary to a 0.4 cm stone at the  left ureterovesical junction. There is some periureteral stranding.  There is a 0.2 cm nonobstructing intrarenal left kidney stone.     No evidence for right sided hydronephrosis or urinary tract calculi.  Bladder is  unremarkable.     Discoid atelectasis medial right lung base. There are multiple small  indeterminate nodules at the lower lobes. A lateral left lower lobe  nodule on series 2 image 8, 0.6 cm in size is unchanged since  5/23/2006 CT abdomen and pelvis compatible with a radiographically  benign finding. Additional pulmonary nodules were not included on the  prior CT with 4 additional nodules at the peripheral left lower lobe  the largest 0.8 cm. Couple tiny peripheral nodules at the right lung  base.     A 3 x 1.8 cm low dense lesion in the superior liver has increased in  size, previously 2.1 cm in size. Not completely characterized but may  be a cyst. No new liver lesions, the gallbladder, spleen, pancreas and  adrenal glands appear normal as seen with CT technique. No periaortic  or pelvic adenopathy. Few scattered colonic diverticula. Normal  appendix. No acute all abnormality.     IMPRESSION  IMPRESSION:  1. 0.4 cm left ureterovesical junction stone with left hydronephrosis  and hydroureter.  2. 0.2 cm left intrarenal kidney stone.  3. Multiple lung nodules at the lung bases under 1 cm in size. Patient  had a prior 9/29/2006 chest CT. The largest 0.8 cm left lower lobe  nodule measured 0.5 cm on the prior chest CT but was present in 2006.  The additional smaller nodules were also present in 2006 as well as a  tiny subpleural right lower lobe nodular densities. The apparent  increase in size of the 0.8 cm left lower lobe nodule could be  artifactual due to differences in slice positioning between exams but  is difficult to exclude minimal growth of this nodule and 12 month  followup CT recommended unless there is another outside chest CT for  comparison.       Virtual Visit Details    Type of service:  Video Visit     Video start time: 1:01 PM    Video end time: 1:09 PM    Originating Location (pt. Location): Home    Distant Location (provider location):  Off-site  Platform used for Video Visit: [employer]      15  minutes spent on the date of the encounter doing chart review, review of test results, patient visit and documentation

## 2023-05-19 ENCOUNTER — VIRTUAL VISIT (OUTPATIENT)
Dept: UROLOGY | Facility: CLINIC | Age: 68
End: 2023-05-19
Payer: COMMERCIAL

## 2023-05-19 DIAGNOSIS — R39.9 LOWER URINARY TRACT SYMPTOMS (LUTS): ICD-10-CM

## 2023-05-19 PROCEDURE — 99212 OFFICE O/P EST SF 10 MIN: CPT | Mod: VID | Performed by: PHYSICIAN ASSISTANT

## 2023-05-19 RX ORDER — ALFUZOSIN HYDROCHLORIDE 10 MG/1
10 TABLET, EXTENDED RELEASE ORAL DAILY
Qty: 30 TABLET | Refills: 11 | Status: SHIPPED | OUTPATIENT
Start: 2023-05-19

## 2023-05-19 NOTE — PATIENT INSTRUCTIONS
UROLOGY CLINIC VISIT PATIENT INSTRUCTIONS    Continue alfuzosin 10 mg once daily which was refilled to your pharmacy.    Due for annual PSA (prostate cancer screening blood test). This will likely be done as part of your upcoming annual physical.    If your primary care provider is willing to take over prescribing refills of alfuzosin, you can follow up with urology on an as needed basis.     If you have any issues, questions or concerns in the meantime, do not hesitate to contact us at [phone] or via [employer].     It was a pleasure meeting with you today.  Thank you for allowing me and my team the privilege of caring for you today.  YOU are the reason we are here, and I truly hope we provided you with the excellent service you deserve.  Please let us know if there is anything else we can do for you so that we can be sure you are leaving completely satisfied with your care experience.

## 2023-05-19 NOTE — NURSING NOTE
Pt states he is out of alfuzosin ER    Is the patient currently in the state of [place]? YES    Visit mode:VIDEO    If the visit is dropped, the patient can be reconnected by: VIDEO VISIT: Text to cell phone: [phone]    Will anyone else be joining the visit? NO      How would you like to obtain your AVS? MyChart    Are changes needed to the allergy or medication list? NO    Reason for visit: Video Visit

## 2023-05-24 ENCOUNTER — TELEPHONE (OUTPATIENT)
Dept: FAMILY MEDICINE | Facility: CLINIC | Age: 68
End: 2023-05-24
Payer: COMMERCIAL

## 2023-05-24 NOTE — TELEPHONE ENCOUNTER
Patient Quality Outreach    Patient is due for the following:   Asthma  -  ACT needed and AAP  Physical Annual Wellness Visit      Topic Date Due     Pneumococcal Vaccine (2 - PPSV23 if available, else PCV20) 08/13/2021     COVID-19 Vaccine (4 - Pfizer series) 06/07/2022     Flu Vaccine (1) 09/01/2022     Zoster (Shingles) Vaccine (2 of 2) 06/07/2022       Next Steps:   Schedule a Annual Wellness Visit    Type of outreach:    Sent [employer] message.      Questions for provider review:    None           Mynor Calderon

## 2023-05-26 ASSESSMENT — ENCOUNTER SYMPTOMS
JOINT SWELLING: 0
WEAKNESS: 0
HEMATURIA: 0
PALPITATIONS: 0
FREQUENCY: 0
ABDOMINAL PAIN: 0
DYSURIA: 0
NERVOUS/ANXIOUS: 0
SHORTNESS OF BREATH: 0
SORE THROAT: 0
DIARRHEA: 0
DIZZINESS: 0
COUGH: 0
HEMATOCHEZIA: 0
EYE PAIN: 0
HEARTBURN: 1
NAUSEA: 0
CHILLS: 0
PARESTHESIAS: 0
HEADACHES: 0
MYALGIAS: 0
CONSTIPATION: 0
FEVER: 0
ARTHRALGIAS: 1

## 2023-05-26 ASSESSMENT — ASTHMA QUESTIONNAIRES
QUESTION_2 LAST FOUR WEEKS HOW OFTEN HAVE YOU HAD SHORTNESS OF BREATH: NOT AT ALL
QUESTION_1 LAST FOUR WEEKS HOW MUCH OF THE TIME DID YOUR ASTHMA KEEP YOU FROM GETTING AS MUCH DONE AT WORK, SCHOOL OR AT HOME: NONE OF THE TIME
ACT_TOTALSCORE: 25
QUESTION_3 LAST FOUR WEEKS HOW OFTEN DID YOUR ASTHMA SYMPTOMS (WHEEZING, COUGHING, SHORTNESS OF BREATH, CHEST TIGHTNESS OR PAIN) WAKE YOU UP AT NIGHT OR EARLIER THAN USUAL IN THE MORNING: NOT AT ALL
QUESTION_5 LAST FOUR WEEKS HOW WOULD YOU RATE YOUR ASTHMA CONTROL: COMPLETELY CONTROLLED
QUESTION_4 LAST FOUR WEEKS HOW OFTEN HAVE YOU USED YOUR RESCUE INHALER OR NEBULIZER MEDICATION (SUCH AS ALBUTEROL): NOT AT ALL
ACT_TOTALSCORE: 25

## 2023-05-26 ASSESSMENT — ACTIVITIES OF DAILY LIVING (ADL): CURRENT_FUNCTION: NO ASSISTANCE NEEDED

## 2023-06-01 ENCOUNTER — HEALTH MAINTENANCE LETTER (OUTPATIENT)
Age: 68
End: 2023-06-01

## 2023-06-02 ENCOUNTER — OFFICE VISIT (OUTPATIENT)
Dept: FAMILY MEDICINE | Facility: CLINIC | Age: 68
End: 2023-06-02
Payer: COMMERCIAL

## 2023-06-02 VITALS
BODY MASS INDEX: 29.72 KG/M2 | HEIGHT: 70 IN | WEIGHT: 207.6 LBS | HEART RATE: 60 BPM | OXYGEN SATURATION: 97 % | TEMPERATURE: 97.9 F | SYSTOLIC BLOOD PRESSURE: 137 MMHG | DIASTOLIC BLOOD PRESSURE: 86 MMHG | RESPIRATION RATE: 12 BRPM

## 2023-06-02 DIAGNOSIS — Z23 NEED FOR SHINGLES VACCINE: ICD-10-CM

## 2023-06-02 DIAGNOSIS — J30.1 NON-SEASONAL ALLERGIC RHINITIS DUE TO POLLEN: ICD-10-CM

## 2023-06-02 DIAGNOSIS — N13.8 BPH WITH OBSTRUCTION/LOWER URINARY TRACT SYMPTOMS: ICD-10-CM

## 2023-06-02 DIAGNOSIS — Z12.5 SCREENING FOR PROSTATE CANCER: ICD-10-CM

## 2023-06-02 DIAGNOSIS — J45.40 MODERATE PERSISTENT ASTHMA IN ADULT WITHOUT COMPLICATION: ICD-10-CM

## 2023-06-02 DIAGNOSIS — Z00.00 ANNUAL PHYSICAL EXAM: Primary | ICD-10-CM

## 2023-06-02 DIAGNOSIS — Z86.0101 H/O ADENOMATOUS POLYP OF COLON: ICD-10-CM

## 2023-06-02 DIAGNOSIS — M47.812 CERVICAL SPONDYLOSIS WITHOUT MYELOPATHY: ICD-10-CM

## 2023-06-02 DIAGNOSIS — Z00.00 ENCOUNTER FOR MEDICARE ANNUAL WELLNESS EXAM: ICD-10-CM

## 2023-06-02 DIAGNOSIS — N40.1 BPH WITH OBSTRUCTION/LOWER URINARY TRACT SYMPTOMS: ICD-10-CM

## 2023-06-02 DIAGNOSIS — M25.552 HIP PAIN, LEFT: ICD-10-CM

## 2023-06-02 DIAGNOSIS — E78.5 HYPERLIPIDEMIA LDL GOAL <130: ICD-10-CM

## 2023-06-02 DIAGNOSIS — Z23 NEED FOR PNEUMOCOCCAL VACCINE: ICD-10-CM

## 2023-06-02 LAB
ALBUMIN SERPL BCG-MCNC: 4.3 G/DL (ref 3.5–5.2)
ALP SERPL-CCNC: 73 U/L (ref 40–129)
ALT SERPL W P-5'-P-CCNC: 16 U/L (ref 10–50)
ANION GAP SERPL CALCULATED.3IONS-SCNC: 10 MMOL/L (ref 7–15)
AST SERPL W P-5'-P-CCNC: 25 U/L (ref 10–50)
BILIRUB SERPL-MCNC: 0.8 MG/DL
BUN SERPL-MCNC: 14.1 MG/DL (ref 8–23)
CALCIUM SERPL-MCNC: 9.3 MG/DL (ref 8.8–10.2)
CHLORIDE SERPL-SCNC: 103 MMOL/L (ref 98–107)
CHOLEST SERPL-MCNC: 214 MG/DL
CREAT SERPL-MCNC: 0.86 MG/DL (ref 0.67–1.17)
DEPRECATED HCO3 PLAS-SCNC: 24 MMOL/L (ref 22–29)
ERYTHROCYTE [DISTWIDTH] IN BLOOD BY AUTOMATED COUNT: 13.1 % (ref 10–15)
GFR SERPL CREATININE-BSD FRML MDRD: >90 ML/MIN/1.73M2
GLUCOSE SERPL-MCNC: 99 MG/DL (ref 70–99)
HCT VFR BLD AUTO: 51.2 % (ref 40–53)
HDLC SERPL-MCNC: 44 MG/DL
HGB BLD-MCNC: 17.4 G/DL (ref 13.3–17.7)
LDLC SERPL CALC-MCNC: 150 MG/DL
MCH RBC QN AUTO: 29.1 PG (ref 26.5–33)
MCHC RBC AUTO-ENTMCNC: 34 G/DL (ref 31.5–36.5)
MCV RBC AUTO: 86 FL (ref 78–100)
NONHDLC SERPL-MCNC: 170 MG/DL
PLATELET # BLD AUTO: 181 10E3/UL (ref 150–450)
POTASSIUM SERPL-SCNC: 4.4 MMOL/L (ref 3.4–5.3)
PROT SERPL-MCNC: 6.9 G/DL (ref 6.4–8.3)
PSA SERPL DL<=0.01 NG/ML-MCNC: 0.63 NG/ML (ref 0–4.5)
RBC # BLD AUTO: 5.97 10E6/UL (ref 4.4–5.9)
SODIUM SERPL-SCNC: 137 MMOL/L (ref 136–145)
TRIGL SERPL-MCNC: 101 MG/DL
WBC # BLD AUTO: 4.8 10E3/UL (ref 4–11)

## 2023-06-02 PROCEDURE — 90677 PCV20 VACCINE IM: CPT | Performed by: INTERNAL MEDICINE

## 2023-06-02 PROCEDURE — 99397 PER PM REEVAL EST PAT 65+ YR: CPT | Mod: 25 | Performed by: INTERNAL MEDICINE

## 2023-06-02 PROCEDURE — 90472 IMMUNIZATION ADMIN EACH ADD: CPT | Performed by: INTERNAL MEDICINE

## 2023-06-02 PROCEDURE — 90750 HZV VACC RECOMBINANT IM: CPT | Performed by: INTERNAL MEDICINE

## 2023-06-02 PROCEDURE — 80053 COMPREHEN METABOLIC PANEL: CPT | Performed by: INTERNAL MEDICINE

## 2023-06-02 PROCEDURE — 36415 COLL VENOUS BLD VENIPUNCTURE: CPT | Performed by: INTERNAL MEDICINE

## 2023-06-02 PROCEDURE — 90471 IMMUNIZATION ADMIN: CPT | Performed by: INTERNAL MEDICINE

## 2023-06-02 PROCEDURE — 80061 LIPID PANEL: CPT | Performed by: INTERNAL MEDICINE

## 2023-06-02 PROCEDURE — 99214 OFFICE O/P EST MOD 30 MIN: CPT | Mod: 25 | Performed by: INTERNAL MEDICINE

## 2023-06-02 PROCEDURE — 84153 ASSAY OF PSA TOTAL: CPT | Performed by: INTERNAL MEDICINE

## 2023-06-02 PROCEDURE — 85027 COMPLETE CBC AUTOMATED: CPT | Performed by: INTERNAL MEDICINE

## 2023-06-02 RX ORDER — FLUTICASONE FUROATE AND VILANTEROL 200; 25 UG/1; UG/1
1 POWDER RESPIRATORY (INHALATION) DAILY
Qty: 1 EACH | Refills: 11 | Status: SHIPPED | OUTPATIENT
Start: 2023-06-02

## 2023-06-02 RX ORDER — SIMVASTATIN 40 MG
40 TABLET ORAL AT BEDTIME
Qty: 90 TABLET | Refills: 0 | Status: CANCELLED | OUTPATIENT
Start: 2023-06-02

## 2023-06-02 ASSESSMENT — ENCOUNTER SYMPTOMS
COUGH: 0
HEMATURIA: 0
NERVOUS/ANXIOUS: 0
PARESTHESIAS: 0
CONSTIPATION: 0
EYE PAIN: 0
WEAKNESS: 0
JOINT SWELLING: 0
SORE THROAT: 0
DYSURIA: 0
ARTHRALGIAS: 1
FEVER: 0
PALPITATIONS: 0
SHORTNESS OF BREATH: 0
HEADACHES: 0
ABDOMINAL PAIN: 0
DIZZINESS: 0
DIARRHEA: 0
MYALGIAS: 0
HEARTBURN: 1
NAUSEA: 0
FREQUENCY: 0
CHILLS: 0
HEMATOCHEZIA: 0

## 2023-06-02 ASSESSMENT — ACTIVITIES OF DAILY LIVING (ADL): CURRENT_FUNCTION: NO ASSISTANCE NEEDED

## 2023-06-02 ASSESSMENT — PAIN SCALES - GENERAL: PAINLEVEL: MODERATE PAIN (4)

## 2023-06-02 NOTE — NURSING NOTE
Prior to immunization administration, verified patients identity using patient s name and date of birth. Please see Immunization Activity for additional information.     Screening Questionnaire for Adult Immunization    Are you sick today?   No   Do you have allergies to medications, food, a vaccine component or latex?   No   Have you ever had a serious reaction after receiving a vaccination?   No   Do you have a long-term health problem with heart, lung, kidney, or metabolic disease (e.g., diabetes), asthma, a blood disorder, no spleen, complement component deficiency, a cochlear implant, or a spinal fluid leak?  Are you on long-term aspirin therapy?   No   Do you have cancer, leukemia, HIV/AIDS, or any other immune system problem?   No   Do you have a parent, brother, or sister with an immune system problem?   No   In the past 3 months, have you taken medications that affect  your immune system, such as prednisone, other steroids, or anticancer drugs; drugs for the treatment of rheumatoid arthritis, Crohn s disease, or psoriasis; or have you had radiation treatments?   No   Have you had a seizure, or a brain or other nervous system problem?   No   During the past year, have you received a transfusion of blood or blood    products, or been given immune (gamma) globulin or antiviral drug?   No   For women: Are you pregnant or is there a chance you could become       pregnant during the next month?   No   Have you received any vaccinations in the past 4 weeks?   No     Immunization questionnaire answers were all negative.      Injection of PCV20 & shingrix given by Angy Darby MA. Patient instructed to remain in clinic for 15 minutes afterwards, and to report any adverse reactions.     Screening performed by Angy Darby MA on 6/2/2023 at 9:37 AM.

## 2023-06-02 NOTE — PROGRESS NOTES
SUBJECTIVE:   CC: Franky is an 67 year old who presents for preventative health visit.     64-year-old gentleman comes in for annual physical examination and for follow-up of chronic health issues which include asthma, hyperlipidemia for which she had one time he was on simvastatin but has not been taking it for about a month.  He ran out of Breo inhaler about a month ago and has not taken.  He does have rescue inhaler albuterol.  He has not had much issue regarding asthma symptoms.  Has a chronic neck pain and has been found to have arthritis.  He has noticed that when he sleeps on the left side his left arm will go numb and if he were to sleep on the right side than the right arm would go numb.  With Celebrex his pain is manageable.  On a regular yearly basis he has been seeing a surgeon regarding it.    Gives history of having had kidney stone x2.  Also problem with prostate.  He had frequency of urination and nocturia along with weak stream.  After starting Alfuzosin his symptoms have improved considerably.  He also has allergic rhinitis and.  History of nasal polyps requiring surgical treatment.  He uses Flonase on a daily basis.    Indicates that he has had some left hip pain pointing to the lateral aspect of the hip for the past year or more.  Does not bother him with activity.  Aches off-and-on particularly when he is sitting for a prolonged period of time or laying on that side.        6/2/2023     8:15 AM   Additional Questions   Roomed by Cha   Accompanied by Self         6/2/2023     8:15 AM   Patient Reported Additional Medications   Patient reports taking the following new medications None     Healthy Habits:     In general, how would you rate your overall health?  Good    Frequency of exercise:  2-3 days/week    Duration of exercise:  Less than 15 minutes    Do you usually eat at least 4 servings of fruit and vegetables a day, include whole grains    & fiber and avoid regularly eating high fat or  "\"junk\" foods?  No    Taking medications regularly:  Yes    Medication side effects:  None    Ability to successfully perform activities of daily living:  No assistance needed    Home Safety:  No safety concerns identified    Hearing Impairment:  No hearing concerns    In the past 6 months, have you been bothered by leaking of urine?  No    In general, how would you rate your overall mental or emotional health?  Good      PHQ-2 Total Score: 0    Additional concerns today:  Yes                  Today's PHQ-2 Score:       6/2/2023     8:06 AM   PHQ-2 ( 1999 [employer])   Q1: Little interest or pleasure in doing things 0   Q2: Feeling down, depressed or hopeless 0   PHQ-2 Score 0   Q1: Little interest or pleasure in doing things Not at all   Q2: Feeling down, depressed or hopeless Not at all   PHQ-2 Score 0           Social History     Tobacco Use     Smoking status: Never     Passive exposure: Never     Smokeless tobacco: Never   Vaping Use     Vaping status: Never Used     Passive vaping exposure: Yes   Substance Use Topics     Alcohol use: No             5/26/2023     5:01 PM   Alcohol Use   Prescreen: >3 drinks/day or >7 drinks/week? No       Last PSA:   PSA   Date Value Ref Range Status   03/25/2021 1.25 0 - 4 ug/L Final     Comment:     Assay Method:  Chemiluminescence using Siemens Vista analyzer     Prostate Specific Antigen Screen   Date Value Ref Range Status   04/12/2022 1.26 0.00 - 4.00 ug/L Final       Reviewed orders with patient. Reviewed health maintenance and updated orders accordingly - Yes  BP Readings from Last 3 Encounters:   06/02/23 137/86   04/12/22 134/86   03/23/22 (!) 142/90    Wt Readings from Last 3 Encounters:   06/02/23 94.2 kg (207 lb 9.6 oz)   04/12/22 94.4 kg (208 lb 2 oz)   03/23/22 96.9 kg (213 lb 9.6 oz)                  Patient Active Problem List   Diagnosis     Sinusitis, chronic     Thoracic or lumbosacral neuritis or radiculitis, unspecified     Moderate persistent asthma in adult " without complication     Actinic keratosis     Hyperlipidemia LDL goal <130     Advanced directives, counseling/discussion     Microscopic hematuria     Calculus of kidney     Cervicalgia     Mild intermittent asthma without complication     Cervical spondylosis without myelopathy     BPH with obstruction/lower urinary tract symptoms     Allergic rhinitis due to pollen     H/O adenomatous polyp of colon     Past Surgical History:   Procedure Laterality Date     COLONOSCOPY N/A 2021    Procedure: Colonoscopy, With Polypectomy And Biopsy;  Surgeon: Laurie Garcia DO;  Location: [facility]     COLONOSCOPY WITH CO2 INSUFFLATION N/A 2021    Procedure: COLONOSCOPY, WITH CO2 INSUFFLATION;  Surgeon: Laurie Garcia DO;  Location: [facility] OR     ENT SURGERY       FASCIOTOMY LOWER EXTREMITY      right leg     PHACOEMULSIFICATION CLEAR CORNEA WITH STANDARD INTRAOCULAR LENS IMPLANT  2012    Procedure: PHACOEMULSIFICATION CLEAR CORNEA WITH STANDARD INTRAOCULAR LENS IMPLANT;  LEFT PHACOEMULSIFICATION CLEAR CORNEA WITH STANDARD INTRAOCULAR LENS IMPLANT ;  Surgeon: Miko Rodríguez MD;  Location: [facility]     PHACOEMULSIFICATION CLEAR CORNEA WITH STANDARD INTRAOCULAR LENS IMPLANT  11/15/2012    Procedure: PHACOEMULSIFICATION CLEAR CORNEA WITH STANDARD INTRAOCULAR LENS IMPLANT;  RIGHT PHACOEMULSIFICATION CLEAR CORNEA WITH STANDARD INTRAOCULAR LENS IMPLANT ;  Surgeon: Miko Rodríguez MD;  Location:  [facility]     total knee         Social History     Tobacco Use     Smoking status: Never     Passive exposure: Never     Smokeless tobacco: Never   Vaping Use     Vaping status: Never Used     Passive vaping exposure: Yes   Substance Use Topics     Alcohol use: No     Family History   Problem Relation Age of Onset     Asthma Father      Alzheimer Disease Paternal Grandmother         alive at 90     Asthma Paternal Grandmother      C.A.D. Paternal Grandfather          86 YOA, [place]     Diabetes Paternal Grandfather          Type 2     Neurologic Disorder Paternal Grandfather         zenobia gehrig's disease,  at 64 YOA     Coronary Artery Disease Paternal Grandfather      Asthma Son          Current Outpatient Medications   Medication Sig Dispense Refill     albuterol (VENTOLIN HFA) 108 (90 Base) MCG/ACT inhaler INHALE 2 PUFFS INTO THE LUNGS EVERY 6 HOURS 18 g 1     alfuzosin ER (UROXATRAL) 10 MG 24 hr tablet Take 1 tablet (10 mg) by mouth daily 30 tablet 11     celecoxib (CELEBREX) 200 MG capsule TAKE 1 CAPSULE(200 MG) BY MOUTH IN THE MORNING 90 capsule 0     fluticasone (FLONASE) 50 MCG/ACT nasal spray Spray 1 spray into both nostrils daily       fluticasone-vilanterol (BREO ELLIPTA) 200-25 MCG/ACT inhaler Inhale 1 puff into the lungs daily 1 each 11     VITAMIN D PO Take 2,000 Int'l Units/day by mouth       zoster vaccine recombinant adjuvanted (SHINGRIX) injection Inject 0.5 mLs into the muscle once for 1 dose Pharmacist administered 0.5 mL 0     simvastatin (ZOCOR) 40 MG tablet TAKE 1 TABLET(40 MG) BY MOUTH AT BEDTIME (Patient not taking: Reported on 2023) 90 tablet 0     Allergies   Allergen Reactions     Dust Mites        Reviewed and updated as needed this visit by clinical staff   Tobacco  Allergies  Meds              Reviewed and updated as needed this visit by Provider                 Past Medical History:   Diagnosis Date     Allergic rhinitis due to pollen 2023     Asthma      BPH with obstruction/lower urinary tract symptoms 2023     Cervical spondylosis without myelopathy 2023     Chondromalacia of patella      H/O adenomatous polyp of colon 2021     H/O nasal polyp      Hyperlipidemia      Hypertension      Microscopic hematuria 2013     Other and unspecified hyperlipidemia      Other chronic sinusitis      Polyarthropathy       Past Surgical History:   Procedure Laterality Date     COLONOSCOPY N/A 2021    Procedure: Colonoscopy, With Polypectomy And Biopsy;  Surgeon:  Laurie Garcia DO;  Location: [facility]     COLONOSCOPY WITH CO2 INSUFFLATION N/A 05/13/2021    Procedure: COLONOSCOPY, WITH CO2 INSUFFLATION;  Surgeon: Laurie Garcia DO;  Location:  OR     ENT SURGERY       FASCIOTOMY LOWER EXTREMITY      right leg     PHACOEMULSIFICATION CLEAR CORNEA WITH STANDARD INTRAOCULAR LENS IMPLANT  08/23/2012    Procedure: PHACOEMULSIFICATION CLEAR CORNEA WITH STANDARD INTRAOCULAR LENS IMPLANT;  LEFT PHACOEMULSIFICATION CLEAR CORNEA WITH STANDARD INTRAOCULAR LENS IMPLANT ;  Surgeon: Miko Rodríguez MD;  Location: [facility]     PHACOEMULSIFICATION CLEAR CORNEA WITH STANDARD INTRAOCULAR LENS IMPLANT  11/15/2012    Procedure: PHACOEMULSIFICATION CLEAR CORNEA WITH STANDARD INTRAOCULAR LENS IMPLANT;  RIGHT PHACOEMULSIFICATION CLEAR CORNEA WITH STANDARD INTRAOCULAR LENS IMPLANT ;  Surgeon: Miko Rodríguez MD;  Location: [facility]     total knee         Review of Systems   Constitutional: Negative for chills and fever.   HENT: Negative for congestion, ear pain, hearing loss and sore throat.    Eyes: Negative for pain and visual disturbance.   Respiratory: Negative for cough and shortness of breath.    Cardiovascular: Negative for chest pain, palpitations and peripheral edema.   Gastrointestinal: Positive for heartburn. Negative for abdominal pain, constipation, diarrhea, hematochezia and nausea.   Genitourinary: Negative for dysuria, frequency, genital sores, hematuria, impotence, penile discharge and urgency.   Musculoskeletal: Positive for arthralgias. Negative for joint swelling and myalgias.   Skin: Negative for rash.   Neurological: Negative for dizziness, weakness, headaches and paresthesias.   Psychiatric/Behavioral: Negative for mood changes. The patient is not nervous/anxious.    All other systems reviewed and are negative.    CONSTITUTIONAL: NEGATIVE for fever, chills, change in weight  INTEGUMENTARY/SKIN: NEGATIVE for worrisome rashes, moles or lesions  EYES: NEGATIVE for  "vision changes or irritation  ENT: NEGATIVE for ear, mouth and throat problems  RESP: NEGATIVE for significant cough or SOB  CV: NEGATIVE for chest pain, palpitations or peripheral edema  GI: NEGATIVE for nausea, abdominal pain, heartburn, or change in bowel habits   male: negative for dysuria, hematuria, decreased urinary stream, erectile dysfunction, urethral discharge  MUSCULOSKELETAL: Neck pain  NEURO: NEGATIVE for weakness, dizziness or paresthesias  ENDOCRINE: NEGATIVE for temperature intolerance, skin/hair changes  HEME/ALLERGY/IMMUNE: NEGATIVE for bleeding problems  PSYCHIATRIC: NEGATIVE for changes in mood or affect    OBJECTIVE:   /86 (BP Location: Right arm, Patient Position: Sitting, Cuff Size: Adult Large)   Pulse 60   Temp 97.9  F (36.6  C) (Oral)   Resp 12   Ht 1.778 m (5' 10\")   Wt 94.2 kg (207 lb 9.6 oz)   SpO2 97%   BMI 29.79 kg/m      Physical Exam  GENERAL: healthy, alert and no distress  EYES: Eyes grossly normal to inspection, PERRL and conjunctivae and sclerae normal  HENT: ear canals and TM's normal, nose and mouth without ulcers or lesions  NECK: no adenopathy, no asymmetry, masses, or scars and thyroid normal to palpation  RESP: lungs clear to auscultation - no rales, rhonchi or wheezes  CV: regular rate and rhythm, normal S1 S2, no S3 or S4, no murmur, click or rub, no peripheral edema and peripheral pulses strong  ABDOMEN: soft, nontender, no hepatosplenomegaly, no masses and bowel sounds normal   (male): normal male genitalia without lesions or urethral discharge, no hernia  RECTAL: normal sphincter tone, no rectal masses, prostate normal size, smooth, nontender without nodules or masses  MS: Range of motion of the neck is limited in all direction.  No spinal tenderness.  No swelling or tenderness over the joints.  SKIN: no suspicious lesions or rashes  NEURO: Normal strength and tone, mentation intact and speech normal  PSYCH: mentation appears normal, affect " "normal/bright  LYMPH: no cervical, supraclavicular, axillary, or inguinal adenopathy        ASSESSMENT/PLAN:     1.  Annual physical exam completed.  2.  Moderate persistent asthma without complication.  Refilled Breo and advised to use it regularly.  Continue with rescue inhaler albuterol.  3.  Hyperlipidemia.  Previously treated with simvastatin and he ran out a month ago.  We will check lipids and get back to with treatment recommendation.  4.  Cervical spondylosis without clear myelopathy.  Uses Celebrex on a daily basis for pain.  Advised to get medical records from his spine or orthopedic surgeon for review.  5.  BPH with lower urinary obstructive symptoms.  Responding well to Alfuzosin and he will continue the same.  6.  Nonseasonal allergic rhinitis with previous history of polyps.  Continue with Flonase daily basis.  7.  Pain left hip.  Range of motion is good so I doubt if he has a significant arthritis.  It is possible that he has labral tear or some other internal derangement.  Symptoms are mild at this point.  Patient will be referred to orthopedic service for further evaluation.    8.  History of adenomatous colon polyps removed in 2021.  9.  Second dose of Shingrix vaccine provided today.  10.  Pneumovax 20 vaccine provided today.  11.  Screening for prostate cancer by performing PSA.    Patient will have CBC, CMP, lipids and a PSA performed today.  I will get back to the results.      Patient has been advised of split billing requirements and indicates understanding: Yes      COUNSELING:   Reviewed preventive health counseling, as reflected in patient instructions       Regular exercise       Healthy diet/nutrition      BMI:   Estimated body mass index is 29.79 kg/m  as calculated from the following:    Height as of this encounter: 1.778 m (5' 10\").    Weight as of this encounter: 94.2 kg (207 lb 9.6 oz).         He reports that he has never smoked. He has never been exposed to tobacco smoke. He has " never used smokeless tobacco.        Kristopher Jovel MD  [facility]    The patient was counseled and encouraged to consider modifying their diet and eating habits. He was provided with information on recommended healthy diet options.

## 2023-06-02 NOTE — PATIENT INSTRUCTIONS
Patient Education   Personalized Prevention Plan  You are due for the preventive services outlined below.  Your care team is available to assist you in scheduling these services.  If you have already completed any of these items, please share that information with your care team to update in your medical record.  Health Maintenance Due   Topic Date Due     Cholesterol Lab  04/12/2023       Understanding USDA MyPlate  The USDA has guidelines to help you make healthy food choices. These are called MyPlate. MyPlate shows the food groups that make up healthy meals using the image of a place setting. Before you eat, think about the healthiest choices for what to put on your plate or in your cup or bowl. To learn more about building a healthy plate, visit www.choosemyplate.gov.     The food groups    Fruits. Any fruit or 100% fruit juice counts as part of the Fruit Group. Fruits may be fresh, canned, frozen, or dried, and may be whole, cut-up, or pureed. Make 1/2 of your plate fruits and vegetables.    Vegetables. Any vegetable or 100% vegetable juice counts as a member of the Vegetable Group. Vegetables may be fresh, frozen, canned, or dried. They can be served raw or cooked and may be whole, cut-up, or mashed. Make 1/2 of your plate fruits and vegetables.    Grains. All foods made from grains are part of the Grains Group. These include wheat, rice, oats, cornmeal, and barley. Grains are often used to make foods such as bread, pasta, oatmeal, cereal, tortillas, and grits. Grains should be no more than 1/4 of your plate. At least half of your grains should be whole grains.    Protein. This group includes meat, poultry, seafood, beans and peas, eggs, processed soy products (such as tofu), nuts (including nut butters), and seeds. Make protein choices no more than 1/4 of your plate. Meat and poultry choices should be lean or low fat.    Dairy. The Dairy Group includes all fluid milk products and foods made from milk that  contain calcium, such as yogurt and cheese. (Foods that have little calcium, such as cream, butter, and cream cheese, are not part of this group.) Most dairy choices should be low-fat or fat-free.    Oils. Oils aren't a food group, but they do contain essential nutrients. However it's important to watch your intake of oils. These are fats that are liquid at room temperature. They include canola, corn, olive, soybean, vegetable, and sunflower oil. Foods that are mainly oil include mayonnaise, certain salad dressings, and soft margarines. You likely already get your daily oil allowance from the foods you eat.  Things to limit  Eating healthy also means limiting these things in your diet:    Salt (sodium). Many processed foods have a lot of sodium. To keep sodium intake down, eat fresh vegetables, meats, poultry, and seafood when possible. Purchase low-sodium, reduced-sodium, or no-salt-added food products at the store. And don't add salt to your meals at home. Instead, season them with herbs and spices such as dill, oregano, cumin, and paprika. Or try adding flavor with lemon or lime zest and juice.    Saturated fat. Saturated fats are most often found in animal products such as beef, pork, and chicken. They are often solid at room temperature, such as butter. To reduce your saturated fat intake, choose leaner cuts of meat and poultry. And try healthier cooking methods such as grilling, broiling, roasting, or baking. For a simple lower-fat swap, use plain nonfat yogurt instead of mayonnaise when making potato salad or macaroni salad.    Added sugars. These are sugars added to foods. They are in foods such as ice cream, candy, soda, fruit drinks, sports drinks, energy drinks, cookies, pastries, jams, and syrups. Cut down on added sugars by sharing sweet treats with a family member or friend. You can also choose fruit for dessert, and drink water or other unsweetened beverages.  Sean last reviewed this educational  content on 6/1/2020 2000-2022 [employer]. All rights reserved. This information is not intended as a substitute for professional medical care. Always follow your healthcare professional's instructions.

## 2023-06-05 ENCOUNTER — TELEPHONE (OUTPATIENT)
Dept: FAMILY MEDICINE | Facility: CLINIC | Age: 68
End: 2023-06-05
Payer: COMMERCIAL

## 2023-06-05 DIAGNOSIS — E78.5 HYPERLIPIDEMIA LDL GOAL <130: ICD-10-CM

## 2023-06-05 RX ORDER — SIMVASTATIN 40 MG
40 TABLET ORAL AT BEDTIME
Qty: 90 TABLET | Refills: 0 | Status: SHIPPED | OUTPATIENT
Start: 2023-06-05 | End: 2023-09-05

## 2023-06-05 NOTE — TELEPHONE ENCOUNTER
DIAGNOSIS: Left hip pain   APPOINTMENT DATE: 06/16/2023    NOTES STATUS DETAILS   OFFICE NOTE from referring provider Internal 06/02/2023 Dr Jovel [facility]    OFFICE NOTE from other specialist N/A    DISCHARGE SUMMARY from hospital N/A    DISCHARGE REPORT from the ER N/A    OPERATIVE REPORT N/A    EMG report N/A    MEDICATION LIST N/A    MRI Internal 06/24/2005 lumbar spine   DEXA (osteoporosis/bone health) N/A    CT SCAN N/A    XRAYS (IMAGES & REPORTS) N/A

## 2023-06-05 NOTE — TELEPHONE ENCOUNTER
Patient Returning Call    Reason for call:  Medication question    Information relayed to patient:  N/a     Patient has additional questions:  No      Could we send this information to you in [employer] or would you prefer to receive a phone call?:   Patient would prefer a phone call   Okay to leave a detailed message?: Yes at Home number on file [phone] (home)

## 2023-06-05 NOTE — TELEPHONE ENCOUNTER
RN called patient to further triage. Patient states he would like to restart Simvastatin for his cholesterol. Requesting Rx to be sent to [facility].    Routing to provider to review and advise.    MONSE Wong  [facility] Primary [facility] Triage

## 2023-06-12 NOTE — PROGRESS NOTES
Rasheed Zimmerman  :  1955  DOS: 2023  MRN: [id number]  PCP: Chiara Waterman    Sports Medicine Clinic Visit      HPI  Rasheed Zimmerman is a 67 year old male who is seen in consultation at the request of  Kristohper Jovel M.D. presenting with left hip pain.    - Mechanism of Injury: 2019: Was reaching really high to paint a spot missed and his feet slipped out from under him. Landed on his left hip. Increase in pain over a year without acute injury onset.  - Prior evaluation:  Discussed with Kristopher Jovel MD on 23.  Endorsed lateral left hip pain with prolonged sitting and direct side-lying.    - Pain Character:  Pain has been present for 4 years. Seems to be worsening over the past past year.  Pain is well localized to the lateral left hip without significant radiation.  - Endorses: Pain to activity and palpation  - Denies:  swelling, clicking, popping, grinding, mechanical locking symptoms, instability, numbness, tingling, radicular shooting pain, weakness.   - Alleviating factors:  nothing  - Aggravating factors:  Bicycle riding, lying on left side, movement after prolonged sitting, sitting in curved car seats  - Treatments tried: ice, ibuprofen    - Patient Goals:  discuss treatment options  - Social History: works as a . [employer]      Review of Systems  Musculoskeletal: as above  Remainder of review of systems is negative including constitutional, CV, pulmonary, GI, Skin and Neurologic except as noted in HPI or medical history.    Past Medical History:   Diagnosis Date     Allergic rhinitis due to pollen 2023     Asthma      BPH with obstruction/lower urinary tract symptoms 2023     Cervical spondylosis without myelopathy 2023     Chondromalacia of patella      H/O adenomatous polyp of colon 2021     H/O nasal polyp      Hyperlipidemia      Hypertension      Microscopic hematuria 2013     Other and unspecified hyperlipidemia      Other chronic sinusitis       Polyarthropathy      Past Surgical History:   Procedure Laterality Date     COLONOSCOPY N/A 2021    Procedure: Colonoscopy, With Polypectomy And Biopsy;  Surgeon: Laurie Garcia DO;  Location: [facility] OR     COLONOSCOPY WITH CO2 INSUFFLATION N/A 2021    Procedure: COLONOSCOPY, WITH CO2 INSUFFLATION;  Surgeon: Laurie Garcia DO;  Location: [facility] OR     ENT SURGERY       FASCIOTOMY LOWER EXTREMITY      right leg     PHACOEMULSIFICATION CLEAR CORNEA WITH STANDARD INTRAOCULAR LENS IMPLANT  2012    Procedure: PHACOEMULSIFICATION CLEAR CORNEA WITH STANDARD INTRAOCULAR LENS IMPLANT;  LEFT PHACOEMULSIFICATION CLEAR CORNEA WITH STANDARD INTRAOCULAR LENS IMPLANT ;  Surgeon: Miko Rodríguez MD;  Location:  [facility]     PHACOEMULSIFICATION CLEAR CORNEA WITH STANDARD INTRAOCULAR LENS IMPLANT  11/15/2012    Procedure: PHACOEMULSIFICATION CLEAR CORNEA WITH STANDARD INTRAOCULAR LENS IMPLANT;  RIGHT PHACOEMULSIFICATION CLEAR CORNEA WITH STANDARD INTRAOCULAR LENS IMPLANT ;  Surgeon: Miko Rodríguez MD;  Location:  [facility]     total knee       Family History   Problem Relation Age of Onset     Asthma Father      Alzheimer Disease Paternal Grandmother         alive at 90     Asthma Paternal Grandmother      C.A.D. Paternal Grandfather          86 YOA, MI     Diabetes Paternal Grandfather         Type 2     Neurologic Disorder Paternal Grandfather         zenobia gehrig's disease,  at 64 YOA     Coronary Artery Disease Paternal Grandfather      Asthma Son          Objective  Wt 94.2 kg (207 lb 9.6 oz)   BMI 29.79 kg/m        General: healthy, alert and in no acute distress.      HEENT: no scleral icterus or conjunctival erythema.     Skin: no suspicious lesions or rash. No jaundice.     CV: regular rhythm by palpation, 2+ distal pulses.    Resp: normal respiratory effort without conversational dyspnea.     Psych: normal mood and affect.      Gait: nonantalgic, appropriate coordination and balance.      Neuro:        - Sensation to light touch:    - Intact throughout the BLE including all peripheral nerve distributions.        - MSR:      RLE  LLE  - Patella 2+ 2+  - Achilles 2+ 2+       - Special tests:   - Slump/SLR:  Neg    MSK - Hip:       - Inspection:    - No significant swelling, erythema, warmth, ecchymosis, lesion.   - Alignment without trendelenburg gait or hip drop.        - ROM:    - Full AROM/PROM with lateral hip pain during hip abduction and external rotation       - Palpation:    - TTP at the left greater trochanter.  - NTTP elsewhere.        - Strength:  (*antalgic)  RLE LLE  - Hip Flexion  5 5    - Hip Abduction 5 5*   - Hip Adduction 5 5  - Knee Flexion  5 5  - Knee Extension 5 5  - Dorsiflexion  5 5  - Plantarflexion 5 5  - Ext. Shimon. Longus 5 5  - Inversion  5 5  - Eversion  5 5       - Special tests:   - Log roll:  Neg    - Resisted SLR:  Neg    - FADIR:  Neg    - Scour:  Neg    - MOOSE: Positive for lateral hip pain   - Tiffanie:  Neg    Radiology  I independently reviewed today's new relevant imaging, with the following interpretation:  - XR pelvis and L hip shows normal anatomic alignment without significant degenerative changes, fractures, or dislocations.      Procedure  Trochanteric Hip Injection - Ultrasound Guided  The patient was informed of the risks and the benefits of the procedure and a written consent was signed.  The patient s left lateral hip was prepped with chlorhexidine in sterile fashion.   40 mg of triamcinolone suspension was drawn up into a 5 mL syringe with 1 mL of 1% lidocaine, 1 mL of 0.5% bupivacaine.  A separate 3 mL syringe was prepared with 3 mL of 1% lidocaine for local anesthetic.  Injection was performed using sterile technique.  Under ultrasound guidance a 1.5 inch 25-gauge needle was used to administer local anesthetic.  Then a 3.5-inch 22-gauge needle was used to enter the mateusz-trochanteric tissue into the greater trochanteric bursa.  Needle placement was  visualized and documented with ultrasound which was deemed necessary to ensure medication did not enter the tendon itself, which could potentially cause tendon damage.   Injection performed long axis to the probe with probe in short axis to the greater trochanter.  Expansion of the mateusz-trochanteric tissue was visualized under ultrasound upon injection.  Images were permanently stored for the patient's record.  There were no complications. The patient tolerated the procedure well. There was negligible bleeding.     Large Joint Injection/Arthocentesis: L greater trochanteric bursa    Date/Time: 6/16/2023 3:50 PM    Performed by: Denzel Mcmillan DO  Authorized by: Denzel Mcmillan DO    Indications:  Pain  Needle Size:  25 G  Guidance: landmark guided    Approach:  Lateral  Location:  Hip      Site:  L greater trochanteric bursa  Medications:  40 mg triamcinolone 40 MG/ML; 1 mL lidocaine 1 %; 1 mL bupivacaine 0.5 %  Outcome:  Tolerated well, no immediate complications  Procedure discussed: discussed risks, benefits, and alternatives    Consent Given by:  Patient  Prep: patient was prepped and draped in usual sterile fashion          Assessment  1. Greater trochanteric pain syndrome of left lower extremity        Plan  Rasheed Zimmerman is a 67 year old male that presents with chronic lateral left hip pain after a direct trauma injury in 2019, which has seemed to have gotten worse this year.  Pain is exacerbated by prolonged activities and direct pressure.  TTP over the greater trochanter.  No neurologic symptoms and no positive hip impingement signs on exam.  History and physical exam appear most consistent with greater trochanteric pain syndrome on the left.  Discussed the nature of the condition and treatment options and mutually agreed upon the following plan:    - Imaging:          - Reviewed relevant imaging in the chart.  -XR pelvis and L hip ordered today pre-clinic and independently interpreted by myself.    -  Reviewed results and images with patient.   - Medications:          - Discussed pharmacologic options for pain relief.   - May use NSAIDs (Ibuprofen, Naproxen) or Acetaminophen (Tylenol) as needed for pain control.   - May also use topical medications such as lidocaine, IcyHot, BioFreeze, or Voltaren gel as needed for pain control.  May use Voltaren gel up to 4 times per day as needed over the area of pain.  - Injections:          - Discussed possible injection options and alternatives.    - Options include corticosteroid injection of the greater trochanteric bursa.   - Performed a corticosteroid injection of the left greater trochanteric bursa today in clinic. Patient tolerated the procedure well without complications.    - Post-procedure instructions:    - Keep the injection site clean and dry.   - Do not submerge the injection site for 24 hours (no baths, pools). Showers are ok.   - Rest the area for 24-48 hours before resuming normal activities. Avoid overexerting the area for the first few weeks.   - It may take 2-3 days to start noticing the effects of the injection and up to 3-4 weeks to feel significant benefits.   - Therapy:          - Discussed the benefits of physical therapy vs home exercise program for optimization of range of motion, flexibility, strength, stability and function.   - Preference is for physical therapy.   - Physical Therapy referral placed today and instructed to call [phone] to schedule appointments.   - Modalities:          - May use ice, heat, massage or other modalities as needed.   - Activity:          - Encouraged to remain active and participate in regular activities as symptoms allow.  Avoid or modify exacerbating activities as much as possible.  Adjust sleeping and sitting position to avoid direct pressure on the greater trochanter.   - Follow up:          - As needed in the future for re-evaluation and update to treatment plan, or sooner for new/worsening symptoms.  -  Patient has clinic contact information for questions or concerns.       Denzel Mcmillan DO, SHIRLEYM  [facility] - Department of Orthopedic Surgery       Disclaimer:  This note was prepared and written using Dragon Medical dictation software. As a result, there may be errors in the script that have gone undetected. Please consider this when interpreting the information in this note.

## 2023-06-16 ENCOUNTER — OFFICE VISIT (OUTPATIENT)
Dept: ORTHOPEDICS | Facility: CLINIC | Age: 68
End: 2023-06-16
Attending: INTERNAL MEDICINE
Payer: COMMERCIAL

## 2023-06-16 ENCOUNTER — ANCILLARY PROCEDURE (OUTPATIENT)
Dept: GENERAL RADIOLOGY | Facility: CLINIC | Age: 68
End: 2023-06-16
Attending: STUDENT IN AN ORGANIZED HEALTH CARE EDUCATION/TRAINING PROGRAM
Payer: COMMERCIAL

## 2023-06-16 ENCOUNTER — PRE VISIT (OUTPATIENT)
Dept: ORTHOPEDICS | Facility: CLINIC | Age: 68
End: 2023-06-16

## 2023-06-16 VITALS — WEIGHT: 207.6 LBS | BODY MASS INDEX: 29.79 KG/M2

## 2023-06-16 DIAGNOSIS — M25.552 HIP PAIN, LEFT: ICD-10-CM

## 2023-06-16 DIAGNOSIS — M25.552 GREATER TROCHANTERIC PAIN SYNDROME OF LEFT LOWER EXTREMITY: Primary | ICD-10-CM

## 2023-06-16 PROCEDURE — 73502 X-RAY EXAM HIP UNI 2-3 VIEWS: CPT | Mod: LT | Performed by: RADIOLOGY

## 2023-06-16 PROCEDURE — 20611 DRAIN/INJ JOINT/BURSA W/US: CPT | Mod: LT | Performed by: STUDENT IN AN ORGANIZED HEALTH CARE EDUCATION/TRAINING PROGRAM

## 2023-06-16 PROCEDURE — 99204 OFFICE O/P NEW MOD 45 MIN: CPT | Mod: 25 | Performed by: STUDENT IN AN ORGANIZED HEALTH CARE EDUCATION/TRAINING PROGRAM

## 2023-06-16 RX ADMIN — LIDOCAINE HYDROCHLORIDE 1 ML: 10 INJECTION, SOLUTION INFILTRATION; PERINEURAL at 15:50

## 2023-06-16 RX ADMIN — TRIAMCINOLONE ACETONIDE 40 MG: 40 INJECTION, SUSPENSION INTRA-ARTICULAR; INTRAMUSCULAR at 15:50

## 2023-06-16 RX ADMIN — BUPIVACAINE HYDROCHLORIDE 1 ML: 5 INJECTION, SOLUTION PERINEURAL at 15:50

## 2023-06-16 ASSESSMENT — PAIN SCALES - GENERAL: PAINLEVEL: SEVERE PAIN (6)

## 2023-06-16 NOTE — PATIENT INSTRUCTIONS
Dianna Rasheed Zimmerman ,     A copy of your assessment and our treatment plan that we discussed together is included below, as written in your medical chart.   If you have any questions, please feel free to call the clinic.     --------------------------------------------------  Rasheed Zimmerman is a 67 year old male that presents with chronic lateral left hip pain after a direct trauma injury in 2019, which has seemed to have gotten worse this year.  Pain is exacerbated by prolonged activities and direct pressure.  TTP over the greater trochanter.  No neurologic symptoms and no positive hip impingement signs on exam.  History and physical exam appear most consistent with greater trochanteric pain syndrome on the left.  Discussed the nature of the condition and treatment options and mutually agreed upon the following plan:    - Imaging:          - Reviewed relevant imaging in the chart.  -XR pelvis and L hip ordered today pre-clinic and independently interpreted by myself.    - Reviewed results and images with patient.   - Medications:          - Discussed pharmacologic options for pain relief.   - May use NSAIDs (Ibuprofen, Naproxen) or Acetaminophen (Tylenol) as needed for pain control.   - May also use topical medications such as lidocaine, IcyHot, BioFreeze, or Voltaren gel as needed for pain control.  May use Voltaren gel up to 4 times per day as needed over the area of pain.  - Injections:          - Discussed possible injection options and alternatives.    - Options include corticosteroid injection of the greater trochanteric bursa.   - Performed a corticosteroid injection of the left greater trochanteric bursa today in clinic. Patient tolerated the procedure well without complications.    - Post-procedure instructions:    - Keep the injection site clean and dry.   - Do not submerge the injection site for 24 hours (no baths, pools). Showers are ok.   - Rest the area for 24-48 hours before resuming normal  activities. Avoid overexerting the area for the first few weeks.   - It may take 2-3 days to start noticing the effects of the injection and up to 3-4 weeks to feel significant benefits.   - Therapy:          - Discussed the benefits of physical therapy vs home exercise program for optimization of range of motion, flexibility, strength, stability and function.   - Preference is for physical therapy.   - Physical Therapy referral placed today and instructed to call [phone] to schedule appointments.   - Modalities:          - May use ice, heat, massage or other modalities as needed.   - Activity:          - Encouraged to remain active and participate in regular activities as symptoms allow.  Avoid or modify exacerbating activities as much as possible.  Adjust sleeping and sitting position to avoid direct pressure on the greater trochanter.   - Follow up:          - As needed in the future for re-evaluation and update to treatment plan, or sooner for new/worsening symptoms.  - Patient has clinic contact information for questions or concerns.   --------------------------------------------------    It was a pleasure seeing you today. Thank you for choosing [facility] for your care.       Denzel Mcmillan DO, CAQSM  [facility] - Sports Medicine  [facility] - Department of Orthopedic Surgery     Disclaimer:  This note was prepared and written using Dragon Medical dictation software. As a result, there may be errors in the script that have gone undetected. Please consider this when interpreting the information in this note.

## 2023-06-16 NOTE — LETTER
2023         RE: Rasheed Zimmerman  [address] [facility] [address]        Dear Colleague,    Thank you for referring your patient, Rasheed Zimmerman, to the [facility] [place]. Please see a copy of my visit note below.    Rasheed Zimmemran  :  1955  DOS: 2023  MRN: [id number]  PCP: Chiara Waterman    Sports Medicine Clinic Visit      HPI  Rasheed Zimmerman is a 67 year old male who is seen in consultation at the request of  Kristopher Jovel M.D. presenting with left hip pain.    - Mechanism of Injury: 2019: Was reaching really high to paint a spot missed and his feet slipped out from under him. Landed on his left hip. Increase in pain over a year without acute injury onset.  - Prior evaluation:  Discussed with Kristopher Jovel MD on 23.  Endorsed lateral left hip pain with prolonged sitting and direct side-lying.    - Pain Character:  Pain has been present for 4 years. Seems to be worsening over the past past year.  Pain is well localized to the lateral left hip without significant radiation.  - Endorses: Pain to activity and palpation  - Denies:  swelling, clicking, popping, grinding, mechanical locking symptoms, instability, numbness, tingling, radicular shooting pain, weakness.   - Alleviating factors:  nothing  - Aggravating factors:  Bicycle riding, lying on left side, movement after prolonged sitting, sitting in curved car seats  - Treatments tried: ice, ibuprofen    - Patient Goals:  discuss treatment options  - Social History: works as a . Tool and [employer]      Review of Systems  Musculoskeletal: as above  Remainder of review of systems is negative including constitutional, CV, pulmonary, GI, Skin and Neurologic except as noted in HPI or medical history.    Past Medical History:   Diagnosis Date     Allergic rhinitis due to pollen 2023     Asthma      BPH with obstruction/lower urinary tract symptoms 2023     Cervical spondylosis  without myelopathy 2023     Chondromalacia of patella      H/O adenomatous polyp of colon 2021     H/O nasal polyp      Hyperlipidemia      Hypertension      Microscopic hematuria 2013     Other and unspecified hyperlipidemia      Other chronic sinusitis      Polyarthropathy      Past Surgical History:   Procedure Laterality Date     COLONOSCOPY N/A 2021    Procedure: Colonoscopy, With Polypectomy And Biopsy;  Surgeon: Laurie Garcia DO;  Location: [facility] OR     COLONOSCOPY WITH CO2 INSUFFLATION N/A 2021    Procedure: COLONOSCOPY, WITH CO2 INSUFFLATION;  Surgeon: Laurie Garcia DO;  Location: [facility] OR     ENT SURGERY       FASCIOTOMY LOWER EXTREMITY      right leg     PHACOEMULSIFICATION CLEAR CORNEA WITH STANDARD INTRAOCULAR LENS IMPLANT  2012    Procedure: PHACOEMULSIFICATION CLEAR CORNEA WITH STANDARD INTRAOCULAR LENS IMPLANT;  LEFT PHACOEMULSIFICATION CLEAR CORNEA WITH STANDARD INTRAOCULAR LENS IMPLANT ;  Surgeon: Miko Rodríguez MD;  Location:  [facility]     PHACOEMULSIFICATION CLEAR CORNEA WITH STANDARD INTRAOCULAR LENS IMPLANT  11/15/2012    Procedure: PHACOEMULSIFICATION CLEAR CORNEA WITH STANDARD INTRAOCULAR LENS IMPLANT;  RIGHT PHACOEMULSIFICATION CLEAR CORNEA WITH STANDARD INTRAOCULAR LENS IMPLANT ;  Surgeon: Miko Rodríguez MD;  Location:  [facility]     total knee       Family History   Problem Relation Age of Onset     Asthma Father      Alzheimer Disease Paternal Grandmother         alive at 90     Asthma Paternal Grandmother      C.A.D. Paternal Grandfather          86 YOA, MI     Diabetes Paternal Grandfather         Type 2     Neurologic Disorder Paternal Grandfather         zenobia gehrig's disease,  at 64 YOA     Coronary Artery Disease Paternal Grandfather      Asthma Son          Objective  Wt 94.2 kg (207 lb 9.6 oz)   BMI 29.79 kg/m      General: healthy, alert and in no acute distress.    HEENT: no scleral icterus or conjunctival erythema.    Skin: no suspicious lesions or rash. No jaundice.   CV: regular rhythm by palpation, 2+ distal pulses.  Resp: normal respiratory effort without conversational dyspnea.   Psych: normal mood and affect.    Gait: nonantalgic, appropriate coordination and balance.     Neuro:        - Sensation to light touch:    - Intact throughout the BLE including all peripheral nerve distributions.        - MSR:      RLE  LLE  - Patella 2+ 2+  - Achilles 2+ 2+       - Special tests:   - Slump/SLR:  Neg    MSK - Hip:       - Inspection:    - No significant swelling, erythema, warmth, ecchymosis, lesion.   - Alignment without trendelenburg gait or hip drop.        - ROM:    - Full AROM/PROM with lateral hip pain during hip abduction and external rotation       - Palpation:    - TTP at the left greater trochanter.  - NTTP elsewhere.        - Strength:  (*antalgic)  RLE LLE  - Hip Flexion  5 5    - Hip Abduction 5 5*   - Hip Adduction 5 5  - Knee Flexion  5 5  - Knee Extension 5 5  - Dorsiflexion  5 5  - Plantarflexion 5 5  - Ext. Shimon. Longus 5 5  - Inversion  5 5  - Eversion  5 5       - Special tests:   - Log roll:  Neg    - Resisted SLR:  Neg    - FADIR:  Neg    - Scour:  Neg    - MOOSE: Positive for lateral hip pain   - Tiffanie:  Neg    Radiology  I independently reviewed today's new relevant imaging, with the following interpretation:  - XR pelvis and L hip shows normal anatomic alignment without significant degenerative changes, fractures, or dislocations.      Procedure  Trochanteric Hip Injection - Ultrasound Guided  The patient was informed of the risks and the benefits of the procedure and a written consent was signed.  The patient s left lateral hip was prepped with chlorhexidine in sterile fashion.   40 mg of triamcinolone suspension was drawn up into a 5 mL syringe with 1 mL of 1% lidocaine, 1 mL of 0.5% bupivacaine.  A separate 3 mL syringe was prepared with 3 mL of 1% lidocaine for local anesthetic.  Injection was performed  using sterile technique.  Under ultrasound guidance a 1.5 inch 25-gauge needle was used to administer local anesthetic.  Then a 3.5-inch 22-gauge needle was used to enter the mateusz-trochanteric tissue into the greater trochanteric bursa.  Needle placement was visualized and documented with ultrasound which was deemed necessary to ensure medication did not enter the tendon itself, which could potentially cause tendon damage.   Injection performed long axis to the probe with probe in short axis to the greater trochanter.  Expansion of the mateusz-trochanteric tissue was visualized under ultrasound upon injection.  Images were permanently stored for the patient's record.  There were no complications. The patient tolerated the procedure well. There was negligible bleeding.     Large Joint Injection/Arthocentesis: L greater trochanteric bursa    Date/Time: 6/16/2023 3:50 PM    Performed by: Denzel Mcmillan DO  Authorized by: Denzel Mcmillan DO    Indications:  Pain  Needle Size:  25 G  Guidance: landmark guided    Approach:  Lateral  Location:  Hip      Site:  L greater trochanteric bursa  Medications:  40 mg triamcinolone 40 MG/ML; 1 mL lidocaine 1 %; 1 mL bupivacaine 0.5 %  Outcome:  Tolerated well, no immediate complications  Procedure discussed: discussed risks, benefits, and alternatives    Consent Given by:  Patient  Prep: patient was prepped and draped in usual sterile fashion          Assessment  1. Greater trochanteric pain syndrome of left lower extremity        Plan  Rasheed Zimmerman is a 67 year old male that presents with chronic lateral left hip pain after a direct trauma injury in 2019, which has seemed to have gotten worse this year.  Pain is exacerbated by prolonged activities and direct pressure.  TTP over the greater trochanter.  No neurologic symptoms and no positive hip impingement signs on exam.  History and physical exam appear most consistent with greater trochanteric pain syndrome on the left.   Discussed the nature of the condition and treatment options and mutually agreed upon the following plan:    - Imaging:          - Reviewed relevant imaging in the chart.  -XR pelvis and L hip ordered today pre-clinic and independently interpreted by myself.    - Reviewed results and images with patient.   - Medications:          - Discussed pharmacologic options for pain relief.   - May use NSAIDs (Ibuprofen, Naproxen) or Acetaminophen (Tylenol) as needed for pain control.   - May also use topical medications such as lidocaine, IcyHot, BioFreeze, or Voltaren gel as needed for pain control.  May use Voltaren gel up to 4 times per day as needed over the area of pain.  - Injections:          - Discussed possible injection options and alternatives.    - Options include corticosteroid injection of the greater trochanteric bursa.   - Performed a corticosteroid injection of the left greater trochanteric bursa today in clinic. Patient tolerated the procedure well without complications.    - Post-procedure instructions:    - Keep the injection site clean and dry.   - Do not submerge the injection site for 24 hours (no baths, pools). Showers are ok.   - Rest the area for 24-48 hours before resuming normal activities. Avoid overexerting the area for the first few weeks.   - It may take 2-3 days to start noticing the effects of the injection and up to 3-4 weeks to feel significant benefits.   - Therapy:          - Discussed the benefits of physical therapy vs home exercise program for optimization of range of motion, flexibility, strength, stability and function.   - Preference is for physical therapy.   - Physical Therapy referral placed today and instructed to call [phone] to schedule appointments.   - Modalities:          - May use ice, heat, massage or other modalities as needed.   - Activity:          - Encouraged to remain active and participate in regular activities as symptoms allow.  Avoid or modify exacerbating  activities as much as possible.  Adjust sleeping and sitting position to avoid direct pressure on the greater trochanter.   - Follow up:          - As needed in the future for re-evaluation and update to treatment plan, or sooner for new/worsening symptoms.  - Patient has clinic contact information for questions or concerns.       Denzel Mcmillan DO, CAQSM  [facility]  [facility] - Department of Orthopedic Surgery       Disclaimer:  This note was prepared and written using Dragon Medical dictation software. As a result, there may be errors in the script that have gone undetected. Please consider this when interpreting the information in this note.       Again, thank you for allowing me to participate in the care of your patient.        Sincerely,        Denzel Mcmillan DO

## 2023-06-22 ENCOUNTER — THERAPY VISIT (OUTPATIENT)
Dept: PHYSICAL THERAPY | Facility: CLINIC | Age: 68
End: 2023-06-22
Attending: STUDENT IN AN ORGANIZED HEALTH CARE EDUCATION/TRAINING PROGRAM
Payer: COMMERCIAL

## 2023-06-22 DIAGNOSIS — M25.552 GREATER TROCHANTERIC PAIN SYNDROME OF LEFT LOWER EXTREMITY: ICD-10-CM

## 2023-06-22 DIAGNOSIS — M25.552 HIP PAIN, LEFT: ICD-10-CM

## 2023-06-22 PROCEDURE — 97110 THERAPEUTIC EXERCISES: CPT | Mod: GP | Performed by: PHYSICAL THERAPIST

## 2023-06-22 PROCEDURE — 97162 PT EVAL MOD COMPLEX 30 MIN: CPT | Mod: GP | Performed by: PHYSICAL THERAPIST

## 2023-06-22 NOTE — PROGRESS NOTES
PHYSICAL THERAPY EVALUATION  Type of Visit: Evaluation    See electronic medical record for Abuse and Falls Screening details.    Subjective      Presenting condition or subjective complaint: Pt reports in 2019 he was painting the ceiling and feet slipped out from him and landed on his hip went to urgent care, imaged left hip no fx so said give it time which it did get better but never fully recovered.Now that its spring starting to do more things and starting to bug him more again.  Date of onset: 23    Relevant medical history: Cancer; Asthma   Dates & types of surgery: right knee replacement , oscar in tibia crush injury    Prior diagnostic imaging/testing results: X-ray left hip no signficant findings.   Prior therapy history for the same diagnosis, illness or injury: No      Prior Level of Function   Transfers:   Ambulation:   ADL:   IADL:     Living Environment  Social support: With a significant other or spouse   Type of home: House   Stairs to enter the home: Yes 2 Is there a railing: Yes   Ramp: No   Stairs inside the home: Yes 12 Is there a railing: Yes   Help at home:    Equipment owned:       Employment: Yes tool and  for the [employer]  Hobbies/Interests: biking exercising, hiking.    Patient goals for therapy: biking, exercises.    Pain assessment: Location: left hip  /groin/Ratin/10 PL     Objective   HIP EVALUATION  PAIN: Pain Level at Rest: 1/10  Pain Level with Use: 4/10  Pain Location: hip and groin  Pain Quality: Aching  Pain Frequency: constant  Pain is Exacerbated By: biking, walking, activity in general, walking and carrying a weight.   Pain is Relieved By: ice, time with rest  Pain Progression: Worsened  INTEGUMENTARY (edema, incisions):   POSTURE:   GAIT:   Weightbearing Status:   Assistive Device(s):   Gait Deviations:   BALANCE/PROPRIOCEPTION:   WEIGHTBEARING ALIGNMENT:   NON-WEIGHTBEARING ALIGNMENT:    ROM:   (Degrees) Left AROM Left PROM  Right AROM Right PROM   Hip  Flexion 115  112    Hip Extension       Hip Abduction 40  44    Hip Adduction       Hip Internal Rotation 33  20    Hip External Rotation 48  44    Knee Flexion       Knee Extension       Lumbar Side glide     Lumbar Flexion    Lumbar Extension    Pain:   End feel:       PELVIC/SI SCREEN:   STRENGTH:   Pain: - none + mild ++ moderate +++ severe  Strength Scale: 0-5/5 Left Right   Hip Flexion 5 5   Hip Extension 5 5   Hip Abduction 5 5   Hip Adduction 5 5   Hip Internal Rotation 5 5   Hip External Rotation 4, ++ (mod) 5   Knee Flexion 5 5   Knee Extension 5 5     LE FLEXIBILITY: right hamstring worse than left which is painful side as well has hip IR / ER .   SPECIAL TESTS:   FUNCTIONAL TESTS:   PALPATION:   + Tenderness At Location Left Right   Ischial Tuberosity     Greater Trochanter - -   IT Band - -   Hip Flexors     Piriformis     PSIS     ASIS     Adductors     Abductors     Iliac Crest     Glut Medius     Bursa     Pubis       JOINT MOBILITY:     Assessment & Plan   CLINICAL IMPRESSIONS   Medical Diagnosis: Greater trochanteric pain syndrome of left lower extremity    Treatment Diagnosis: left hip pain   Impression/Assessment: Patient is a 67 year old male with left hip pain complaints.  The following significant findings have been identified: Pain, Decreased ROM/flexibility, Decreased strength and Decreased activity tolerance. These impairments interfere with their ability to perform self care tasks, recreational activities and household chores as compared to previous level of function.     Clinical Decision Making (Complexity):   Clinical Presentation: Evolving/Changing  Clinical Presentation Rationale: based on medical and personal factors listed in PT evaluation  Clinical Decision Making (Complexity): Moderate complexity    PLAN OF CARE  Treatment Interventions:  Modalities: Ultrasound  Interventions: Manual Therapy, Neuromuscular Re-education, Therapeutic Activity, Therapeutic Exercise, Self-Care/Home  Management    Long Term Goals     PT Goal 1  Goal Identifier: biking  Goal Description: bike 4-5 miles painfree  Rationale: to maximize safety and independence with performance of ADLs and functional tasks  Goal Progress: pt bike 4-5 min with 5/10 PL the next day  Target Date: 08/03/23  PT Goal 2  Goal Identifier: walking while carrying  Goal Description: lift and carry 20lbs at side and walk painfree  Rationale: to maximize safety and independence with performance of ADLs and functional tasks  Goal Progress: lifting 20lbs and walking will cause up to 5/10 PL the next day.  Target Date: 08/17/23      Frequency of Treatment: 1 x/ week  Duration of Treatment: 8 weeks    Recommended Referrals to Other Professionals:   Education Assessment:   Learner/Method: Patient;Demonstration;Pictures/Video;No Barriers to Learning    Risks and benefits of evaluation/treatment have been explained.   Patient/Family/caregiver agrees with Plan of Care.     Evaluation Time:     PT Eval, Moderate Complexity Minutes (43071): 25   Present: Not applicable    Signing Clinician: Josse Snowden PT    PHYSICAL THERAPY EVALUATION  Type of Visit: Evaluation    See electronic medical record for Abuse and Falls Screening details.    Subjective      Presenting condition or subjective complaint: Pt reports in 2019 he was painting the ceiling and feet slipped out from him and landed on his hip went to urgent care, imaged left hip no fx so said give it time which it did get better but never fully recovered.Now that its spring starting to do more things and starting to bug him more again.  Date of onset: 06/16/23    Relevant medical history: Cancer; Asthma   Dates & types of surgery: right knee replacement 2015, oscar in tibia crush injury    Prior diagnostic imaging/testing results: X-ray left hip no signficant findings.   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    Patient goals for therapy: biking, exercises.    Pain assessment:      Objective       Assessment & Plan   CLINICAL IMPRESSIONS   Medical Diagnosis: Greater trochanteric pain syndrome of left lower extremity    Treatment Diagnosis: left hip pain   Impression/Assessment: Patient is a 67 year old male with left hip pain complaints.  The following significant findings have been identified: Pain, Decreased ROM/flexibility, Decreased strength and Decreased activity tolerance. These impairments interfere with their ability to perform self care tasks, recreational activities and household chores as compared to previous level of function.     Clinical Decision Making (Complexity):   Clinical Presentation: Evolving/Changing  Clinical Presentation Rationale: based on medical and personal factors listed in PT evaluation  Clinical Decision Making (Complexity): Low complexity    PLAN OF CARE  Treatment Interventions:  Modalities: Ultrasound  Interventions: Manual Therapy, Neuromuscular Re-education, Therapeutic Activity, Therapeutic Exercise, Self-Care/Home Management    Long Term Goals     PT Goal 1  Goal Identifier: biking  Goal Description: bike 4-5 miles painfree  Rationale: to maximize safety and independence with performance of ADLs and functional tasks  Goal Progress: pt bike 4-5 min with 5/10 PL the next day  Target Date: 08/03/23  PT Goal 2  Goal Identifier: walking while carrying  Goal Description: lift and carry 20lbs at side and walk painfree  Rationale: to maximize safety and independence with performance of ADLs and functional  tasks  Goal Progress: lifting 20lbs and walking will cause up to 5/10 PL the next day.  Target Date: 08/17/23      Frequency of Treatment: 1 x/ week  Duration of Treatment: 8 weeks    Recommended Referrals to Other Professionals:   Education Assessment:   Learner/Method: Patient;Demonstration;Pictures/Video;No Barriers to Learning    Risks and benefits of evaluation/treatment have been explained.   Patient/Family/caregiver agrees with Plan of Care.     Evaluation Time:     PT Eval, Moderate Complexity Minutes (46370): 25   Present: Not applicable    Signing Clinician: Josse Snowden PT

## 2023-06-23 RX ORDER — BUPIVACAINE HYDROCHLORIDE 5 MG/ML
1 INJECTION, SOLUTION PERINEURAL
Status: SHIPPED | OUTPATIENT
Start: 2023-06-16

## 2023-06-23 RX ORDER — LIDOCAINE HYDROCHLORIDE 10 MG/ML
1 INJECTION, SOLUTION INFILTRATION; PERINEURAL
Status: SHIPPED | OUTPATIENT
Start: 2023-06-16

## 2023-06-23 RX ORDER — TRIAMCINOLONE ACETONIDE 40 MG/ML
40 INJECTION, SUSPENSION INTRA-ARTICULAR; INTRAMUSCULAR
Status: SHIPPED | OUTPATIENT
Start: 2023-06-16

## 2023-06-28 ENCOUNTER — THERAPY VISIT (OUTPATIENT)
Dept: PHYSICAL THERAPY | Facility: CLINIC | Age: 68
End: 2023-06-28
Attending: STUDENT IN AN ORGANIZED HEALTH CARE EDUCATION/TRAINING PROGRAM
Payer: COMMERCIAL

## 2023-06-28 DIAGNOSIS — M25.552 HIP PAIN, LEFT: Primary | ICD-10-CM

## 2023-06-28 PROCEDURE — 97110 THERAPEUTIC EXERCISES: CPT | Mod: GP

## 2023-07-11 ENCOUNTER — THERAPY VISIT (OUTPATIENT)
Dept: PHYSICAL THERAPY | Facility: CLINIC | Age: 68
End: 2023-07-11
Attending: STUDENT IN AN ORGANIZED HEALTH CARE EDUCATION/TRAINING PROGRAM
Payer: COMMERCIAL

## 2023-07-11 DIAGNOSIS — M25.552 HIP PAIN, LEFT: Primary | ICD-10-CM

## 2023-07-11 PROCEDURE — 97110 THERAPEUTIC EXERCISES: CPT | Mod: GP

## 2023-09-13 NOTE — PROGRESS NOTES
07/11/23 0500   Appointment Info   Signing clinician's name / credentials Laz Salazar, PT, DPT, CSCS, CLT   Total/Authorized Visits 8 E&T   Visits Used 3   Medical Diagnosis Greater trochanteric pain syndrome of left lower extremity   PT Tx Diagnosis left hip pain   Progress Note/Certification   Onset of illness/injury or Date of Surgery 06/16/23   Therapy Frequency 1 x/ week   Predicted Duration 8 weeks   Progress Note Due Date 08/17/23   Progress Note Completed Date 06/22/23       Present No   GOALS   PT Goals 2;3   PT Goal 1   Goal Identifier biking   Goal Description bike 4-5 miles painfree   Rationale to maximize safety and independence with performance of ADLs and functional tasks   Goal Progress He hasn't tried biking since starting therapy, but we biked today in therapy without any pain.  He does state when it did occur with biking his pain would occur hours later. Will further asses.   Target Date 08/03/23   Date Met 07/11/23   PT Goal 2   Goal Identifier walking while carrying   Goal Description lift and carry 20lbs at side and walk painfree   Rationale to maximize safety and independence with performance of ADLs and functional tasks   Goal Progress No longer has pain during the day with any walking, lifting or carrying.   Target Date 08/17/23   Date Met 07/11/23   PT Goal 3   Goal Identifier Sleeping   Goal Description Will have no pain at night or when laying on his left side.   Rationale to maximize safety and independence with performance of ADLs and functional tasks;to maximize safety and independence within the home;to maximize safety and independence with self cares   Goal Progress Has pain at night when sleeping and laying on the left side.   Target Date 08/22/23   Subjective Report   Subjective Report He reports he notices his hip when he lays on his left side because he sleeps on his left side. But no pain during the days.  Only pains when he lays on it and no other  times.   Objective Measures   Objective Measures Objective Measure 1;Objective Measure 2;Objective Measure 3   Objective Measure 3   Objective Measure Gait with carrying   Details No Pain with lunges and carries with weights   Treatment Interventions (PT)   Interventions Therapeutic Procedure/Exercise   Therapeutic Procedure/Exercise   Therapeutic Procedures: strength, endurance, ROM, flexibillity minutes (58276) 40   Therapeutic Procedures Ther Proc 2   Ther Proc 1 Bike for Warm Up   Ther Proc 1 - Details x7 minutes warming up   Ther Proc 2 Suitcase Carries   Ther Proc 2 - Details 3x20 feet with turns carrying 10 lbs   PTRx Ther Proc 1 Standing Hip Flexor Stretch   PTRx Ther Proc 1 - Details 2x30 seconds each side   PTRx Ther Proc 2 Clamshell with Theraband   PTRx Ther Proc 2 - Details 2x30 with Green TB each side   PTRx Ther Proc 3 Hip Abduction Straight Leg Raise   PTRx Ther Proc 3 - Details 2 x 30 GTB around knees   PTRx Ther Proc 4 Bridging With Theraband   PTRx Ther Proc 4 - Details 2x30 with GTB around knees   PTRx Ther Proc 5 Donkey Kick   PTRx Ther Proc 5 - Details Reviewed   PTRx Ther Proc 6 Dumbbell Lunges - Walking   PTRx Ther Proc 6 - Details Holding 5 lbs 4x8 steps each side (In PT Only)   Skilled Intervention Continued similar exercises as they worked well   Patient Response/Progress Tolerated well. Exercises calm and eliminate pain   PTRx Ther Proc 7 Hip Airplane   PTRx Ther Proc 7 - Details 1x10 each hip (In PT Only)   PTRx Ther Proc 8 Dumbbell Russian Dead Lifts - Single Leg   PTRx Ther Proc 8 - Details 1x10 (Peformed poorly due to balance issues) each leg with 5 lbs each hand   Education   Learner/Method Patient;Demonstration;Pictures/Video;No Barriers to Learning   Plan   Home program Continue same program because it's still progressing him nicely.   Plan for next session Discharged   Total Session Time   Timed Code Treatment Minutes 40   Total Treatment Time (sum of timed and untimed services)  40         DISCHARGE  Reason for Discharge: Patient has met all goals.    Equipment Issued: None    Discharge Plan: Patient to continue home program.    Referring Provider:  Denzel Mcmillan

## 2023-10-28 ENCOUNTER — MYC REFILL (OUTPATIENT)
Dept: FAMILY MEDICINE | Facility: CLINIC | Age: 68
End: 2023-10-28
Payer: COMMERCIAL

## 2023-10-28 DIAGNOSIS — E78.5 HYPERLIPIDEMIA LDL GOAL <130: ICD-10-CM

## 2023-10-30 RX ORDER — SIMVASTATIN 40 MG
40 TABLET ORAL AT BEDTIME
Qty: 90 TABLET | Refills: 1 | OUTPATIENT
Start: 2023-10-30

## 2023-10-31 ENCOUNTER — PATIENT OUTREACH (OUTPATIENT)
Dept: GASTROENTEROLOGY | Facility: CLINIC | Age: 68
End: 2023-10-31
Payer: COMMERCIAL

## 2023-11-16 ENCOUNTER — OFFICE VISIT (OUTPATIENT)
Dept: ORTHOPEDICS | Facility: CLINIC | Age: 68
End: 2023-11-16
Payer: COMMERCIAL

## 2023-11-16 DIAGNOSIS — S86.811A STRAIN OF RIGHT CALF MUSCLE: Primary | ICD-10-CM

## 2023-11-16 DIAGNOSIS — S80.11XA HEMATOMA OF RIGHT LOWER LEG: ICD-10-CM

## 2023-11-16 PROCEDURE — 99213 OFFICE O/P EST LOW 20 MIN: CPT | Mod: 25 | Performed by: PREVENTIVE MEDICINE

## 2023-11-16 PROCEDURE — 76882 US LMTD JT/FCL EVL NVASC XTR: CPT | Performed by: PREVENTIVE MEDICINE

## 2023-11-16 NOTE — PROGRESS NOTES
HISTORY OF PRESENT ILLNESS  Mr. Zimmerman is a pleasant 67 year old year old male who presents to clinic today with the following:  What problem are you here for: Evaluation for right calf injury  - compound fracture of left lower leg 30 years ago, hardware is removed. He does have numbness over his left calf due to lower leg surgeries.   - Left knee replacement 2015.  - calf is tender to the touch, bruised, swollen and difficulty bearing weight.     How long have you had this problem: Sunday 11/12/2023, 4 days.    Have you had any recent imaging of this problem? Xrays/MRI/CT scans:  None    Have you had treatments for this problem in the past?  -Medications: aspirin daily  -Physical therapy: None  -Injections: None  -Surgery: None  - applies ice packs prn, elevation,     How severe is this problem today? 0-10 scale: 2/10    How severe has this problem been at WORST in the past? 0-10 scale: 4/10    What do you think caused this problem: Patient reports while visiting his daughter in [place], his wife tripped and he went to help her, he planted his right foot and felt sharp pain.    Does this problem or its symptoms cause difficulty for you falling asleep or staying asleep: Yes    Anything else you want us to know about this problem:  - Patient is a  and is on his feet for an extended period of time and lifts heavy material.   - the patient returned home this afternoon from their driving home from [place], they did stop over night.       MEDICAL HISTORY  Patient Active Problem List   Diagnosis     Sinusitis, chronic     Thoracic or lumbosacral neuritis or radiculitis, unspecified     Moderate persistent asthma in adult without complication     Actinic keratosis     Hyperlipidemia LDL goal <130     Advanced directives, counseling/discussion     Microscopic hematuria     Calculus of kidney     Cervicalgia     Mild intermittent asthma without complication     Cervical spondylosis without myelopathy     BPH with  obstruction/lower urinary tract symptoms     Allergic rhinitis due to pollen     H/O adenomatous polyp of colon     Hip pain, left       Current Outpatient Medications   Medication Sig Dispense Refill     albuterol (VENTOLIN HFA) 108 (90 Base) MCG/ACT inhaler INHALE 2 PUFFS INTO THE LUNGS EVERY 6 HOURS 18 g 1     alfuzosin ER (UROXATRAL) 10 MG 24 hr tablet Take 1 tablet (10 mg) by mouth daily 30 tablet 11     celecoxib (CELEBREX) 200 MG capsule TAKE 1 CAPSULE(200 MG) BY MOUTH IN THE MORNING 90 capsule 1     fluticasone (FLONASE) 50 MCG/ACT nasal spray Spray 1 spray into both nostrils daily       fluticasone-vilanterol (BREO ELLIPTA) 200-25 MCG/ACT inhaler Inhale 1 puff into the lungs daily 1 each 11     simvastatin (ZOCOR) 40 MG tablet TAKE 1 TABLET(40 MG) BY MOUTH AT BEDTIME 90 tablet 1     VITAMIN D PO Take 2,000 Int'l Units/day by mouth         Allergies   Allergen Reactions     Dust Mites        Family History   Problem Relation Age of Onset     Asthma Father      Alzheimer Disease Paternal Grandmother         alive at 90     Asthma Paternal Grandmother      C.A.D. Paternal Grandfather          86 YOA, MI     Diabetes Paternal Grandfather         Type 2     Neurologic Disorder Paternal Grandfather         zenobia gehrig's disease,  at 64 YOA     Coronary Artery Disease Paternal Grandfather      Asthma Son      Social History     Socioeconomic History     Marital status:    Tobacco Use     Smoking status: Never     Passive exposure: Never     Smokeless tobacco: Never   Vaping Use     Vaping Use: Never used   Substance and Sexual Activity     Alcohol use: No     Drug use: No     Sexual activity: Yes     Partners: Female   Other Topics Concern     Parent/sibling w/ CABG, MI or angioplasty before 65F 55M? No       Additional medical/Social/Surgical histories reviewed in [facility] and updated as appropriate.     REVIEW OF SYSTEMS (2023)  10 point ROS of systems including Constitutional, Eyes,  Respiratory, Cardiovascular, Gastroenterology, Genitourinary, Integumentary, Musculoskeletal, Psychiatric, Allergic/Immunologic were all negative except for pertinent positives noted in my HPI.     PHYSICAL EXAM  VSS    General  - normal appearance, in no obvious distress  HEENT  - conjunctivae not injected, moist mucous membranes, normocephalic/atraumatic head, ears normal appearance, no lesions, mouth normal appearance, no scars, normal dentition and teeth present  CV  - normal pulses at posterior tib and dorsalis pedis  Pulm  - normal respiratory pattern, non-labored  Musculoskeletal -right foot / ankle  - stance: normal gait with limp, normal stance without excessive pronation, normal heel inversion with standing heel raise, no obvious leg length discrepancy, abnormal heel and toe walk  - inspection: non thickened mid-substance Achilles tendon ttp over medial gastroc muscle, with bruising,  normal bone and joint alignment, no obvious deformity  - palpation: tender over the medial gastroc muscle  - ROM: no limited passive dorsiflexion, normal plantar flexion, inversion, and eversion  - strength: 5/5 in all planes  Neuro  - no sensory or motor deficit, grossly normal coordination, normal muscle tone  Skin  - has right medial calf ecchymosis, erythema, warmth, or induration, no obvious rash  Psych  - interactive, appropriate, normal mood and affect    ASSESSMENT & PLAN  68 yo male with right calf strain, hematoma    I personally performed a limited diagnostic ultrasound of the patient's Right lower leg in clinic today. This showed hematoma and partial tearing of medial calf muscle. Permanent images are stored in the patient's record.     Ice PRN  Compression over calf  Tylenol PRN  Given HEP  Given walking boot  Given followup next week      Appropriate PPE was utilized for prevention of spread of Covid-19.  Ochoa Chaney MD, CAQSM

## 2023-11-16 NOTE — LETTER
11/16/2023      RE: Rasheed Zimmerman  [address]  [place] [address]     Dear Colleague,    Thank you for referring your patient, Rasheed Zimmerman, to the [facility] [place]. Please see a copy of my visit note below.    HISTORY OF PRESENT ILLNESS  Mr. Zimmerman is a pleasant 67 year old year old male who presents to clinic today with the following:  What problem are you here for: Evaluation for right calf injury  - compound fracture of left lower leg 30 years ago, hardware is removed. He does have numbness over his left calf due to lower leg surgeries.   - Left knee replacement 2015.  - calf is tender to the touch, bruised, swollen and difficulty bearing weight.     How long have you had this problem: Sunday 11/12/2023, 4 days.    Have you had any recent imaging of this problem? Xrays/MRI/CT scans:  None    Have you had treatments for this problem in the past?  -Medications: aspirin daily  -Physical therapy: None  -Injections: None  -Surgery: None  - applies ice packs prn, elevation,     How severe is this problem today? 0-10 scale: 2/10    How severe has this problem been at WORST in the past? 0-10 scale: 4/10    What do you think caused this problem: Patient reports while visiting his daughter in [place], his wife tripped and he went to help her, he planted his right foot and felt sharp pain.    Does this problem or its symptoms cause difficulty for you falling asleep or staying asleep: Yes    Anything else you want us to know about this problem:  - Patient is a  and is on his feet for an extended period of time and lifts heavy material.   - the patient returned home this afternoon from their driving home from [place], they did stop over night.       MEDICAL HISTORY  Patient Active Problem List   Diagnosis    Sinusitis, chronic    Thoracic or lumbosacral neuritis or radiculitis, unspecified    Moderate persistent asthma in adult without complication    Actinic  keratosis    Hyperlipidemia LDL goal <130    Advanced directives, counseling/discussion    Microscopic hematuria    Calculus of kidney    Cervicalgia    Mild intermittent asthma without complication    Cervical spondylosis without myelopathy    BPH with obstruction/lower urinary tract symptoms    Allergic rhinitis due to pollen    H/O adenomatous polyp of colon    Hip pain, left       Current Outpatient Medications   Medication Sig Dispense Refill    albuterol (VENTOLIN HFA) 108 (90 Base) MCG/ACT inhaler INHALE 2 PUFFS INTO THE LUNGS EVERY 6 HOURS 18 g 1    alfuzosin ER (UROXATRAL) 10 MG 24 hr tablet Take 1 tablet (10 mg) by mouth daily 30 tablet 11    celecoxib (CELEBREX) 200 MG capsule TAKE 1 CAPSULE(200 MG) BY MOUTH IN THE MORNING 90 capsule 1    fluticasone (FLONASE) 50 MCG/ACT nasal spray Spray 1 spray into both nostrils daily      fluticasone-vilanterol (BREO ELLIPTA) 200-25 MCG/ACT inhaler Inhale 1 puff into the lungs daily 1 each 11    simvastatin (ZOCOR) 40 MG tablet TAKE 1 TABLET(40 MG) BY MOUTH AT BEDTIME 90 tablet 1    VITAMIN D PO Take 2,000 Int'l Units/day by mouth         Allergies   Allergen Reactions    Dust Mites        Family History   Problem Relation Age of Onset    Asthma Father     Alzheimer Disease Paternal Grandmother         alive at 90    Asthma Paternal Grandmother     C.A.D. Paternal Grandfather          86 YOA, MI    Diabetes Paternal Grandfather         Type 2    Neurologic Disorder Paternal Grandfather         zenobia gehrig's disease,  at 64 YOA    Coronary Artery Disease Paternal Grandfather     Asthma Son      Social History     Socioeconomic History    Marital status:    Tobacco Use    Smoking status: Never     Passive exposure: Never    Smokeless tobacco: Never   Vaping Use    Vaping Use: Never used   Substance and Sexual Activity    Alcohol use: No    Drug use: No    Sexual activity: Yes     Partners: Female   Other Topics Concern    Parent/sibling w/ CABG, MI  or angioplasty before 65F 55M? No       Additional medical/Social/Surgical histories reviewed in [facility] and updated as appropriate.     REVIEW OF SYSTEMS (11/16/2023)  10 point ROS of systems including Constitutional, Eyes, Respiratory, Cardiovascular, Gastroenterology, Genitourinary, Integumentary, Musculoskeletal, Psychiatric, Allergic/Immunologic were all negative except for pertinent positives noted in my HPI.     PHYSICAL EXAM  VSS    General  - normal appearance, in no obvious distress  HEENT  - conjunctivae not injected, moist mucous membranes, normocephalic/atraumatic head, ears normal appearance, no lesions, mouth normal appearance, no scars, normal dentition and teeth present  CV  - normal pulses at posterior tib and dorsalis pedis  Pulm  - normal respiratory pattern, non-labored  Musculoskeletal -right foot / ankle  - stance: normal gait with limp, normal stance without excessive pronation, normal heel inversion with standing heel raise, no obvious leg length discrepancy, abnormal heel and toe walk  - inspection: non thickened mid-substance Achilles tendon ttp over medial gastroc muscle, with bruising,  normal bone and joint alignment, no obvious deformity  - palpation: tender over the medial gastroc muscle  - ROM: no limited passive dorsiflexion, normal plantar flexion, inversion, and eversion  - strength: 5/5 in all planes  Neuro  - no sensory or motor deficit, grossly normal coordination, normal muscle tone  Skin  - has right medial calf ecchymosis, erythema, warmth, or induration, no obvious rash  Psych  - interactive, appropriate, normal mood and affect    ASSESSMENT & PLAN  66 yo male with right calf strain, hematoma    Examined with ultrasound in the office, shows hematoma and partial tearing of medial calf muscle  Ice PRN  Compression over calf  Tylenol PRN  Given HEP  Given walking boot  Given followup next week      Appropriate PPE was utilized for prevention of spread of Covid-19.  Ochoa Chaney,  MD, YSABEL

## 2023-11-16 NOTE — NURSING NOTE
DME FITTING    Relevant Diagnosis: Right Calf Strain  Walking boot was fit on patient's Right ankle/foot.     Person(s) involved in teaching:   Patient and Wife    Brace was applied in standard Manner:  Yes  Brace fit well:  Yes  Patient reports brace to fit comfortably:  Yes    Education:   Patient shown self application and removal of brace: Yes  Patient shown how to adjust brace fit, if necessary: Yes  Patient educated on billing and return policy: Yes  Patient confirmed understanding when and how to contact clinic with concerns: Yes

## 2023-11-16 NOTE — PATIENT INSTRUCTIONS
WHAT IS A CALF STRAIN?      A calf strain is a stretch or tear of a muscle or tendon in the back of your leg below your knee. This area of your leg is called the calf. Tendons are strong bands of tissue that attach muscle to bone.    This type of injury is often called a pulled muscle.    WHAT IS THE CAUSE?    You can strain your calf muscle when you do an activity that involves pushing off forcefully from your toes. For example, it may happen when you are running, jumping, or lunging.    WHAT ARE THE SYMPTOMS?    Symptoms may include:    A snapping or popping sound at the time of the injury  Pain or burning in the back of your lower leg  A feeling that someone has hit you in the back of the leg  Trouble rising up on your toes  Swelling and bruising    HOW IS IT DIAGNOSED?    Your healthcare provider will ask about your symptoms, activities, and medical history and examine your leg.    HOW IS IT TREATED?    You will need to change or stop doing the activities that cause pain until your muscle or tendon has healed. For example, you may need to swim instead of run. You may need to use crutches if it s too painful to walk.    Your healthcare provider may recommend stretching and strengthening exercises. Your provider may refer you to physical therapy.    Your healthcare provider or physical therapist may tape the injured muscle while it heals to help you return to athletic activities. Taping helps reduce movement that may cause more muscle damage. They may recommend wearing an elastic or neoprene sleeve around your calf.    A mild strain may heal within a few weeks. A more severe strain may take 6 weeks or longer to heal.    HOW CAN I TAKE CARE OF MYSELF?    To help relieve swelling and pain:    Put an ice pack, gel pack, or package of frozen vegetables wrapped in a cloth on the sore area every 3 to 4 hours for up to 20 minutes at a time.  Do ice massage. To do this, freeze water in a Styrofoam cup, then peel the top  of the cup away to expose the ice. Hold the bottom of the cup and rub the ice over the painful area for 5 to 10 minutes. Do this several times a day while you have pain.  Keep your leg up on pillows when you sit or lie down.  Take nonprescription pain medicine, such as acetaminophen, ibuprofen, or naproxen. Read the label and take as directed. Nonsteroidal anti-inflammatory medicines (NSAIDs), such as ibuprofen or naproxen, may cause stomach bleeding and other problems. These risks increase with age. Unless recommended by your healthcare provider, do not take an NSAID for more than 10 days.  After you recover from your injury, moist heat may help relax your muscles and make it easier to use them. Put moist heat on your calf for 10 to 15 minutes before you do warm-up and stretching exercises. Moist heat includes heat patches or moist heating pads that you can buy at most [employer], a wet washcloth or towel that has been heated in a microwave or the dryer, or a hot shower. Don t use heat if you have swelling.    Use an elastic bandage when you return to your activities as directed by your provider.    Follow your healthcare provider's instructions, including any exercises recommended by your provider. Ask your provider:    How long it will take to recover  What activities you should avoid and when you can return to your normal activities  How to take care of yourself at home  What symptoms or problems you should watch for and what to do if you have them  Make sure you know when you should come back for a checkup.    HOW CAN I HELP PREVENT A CALF STRAIN?    Warm-up exercises and stretching before activities can help prevent injuries. This is especially important if you are doing jumping or sprinting sports.    Developed by [employer].  Published by [employer].  Copyright  2014 [facility] and/or one of its subsidiaries. All rights reserved.      CALF STRAIN EXERCISES   You can start gently stretching your  calf muscle with the towel stretch right away. Make sure you feel only a gentle pull and not a sharp pain in your calf while you are doing the stretch.    Wearing a quarter to half-inch heel lift in each shoe might reduce your pain as you start to recover from your injury. You can purchase heel lifts at most pharmacies. You can stop using the heel lift when you have no pain while walking.    Towel stretch: Sit on a hard surface with your injured leg stretched out in front of you. Loop a towel around your toes and the ball of your foot and pull the towel toward your body keeping your leg straight. Hold this position for 15 to 30 seconds and then relax. Repeat 3 times.  After you can do the towel stretch easily, you can start the standing calf stretch.    Standing calf stretch: Stand facing a wall with your hands on the wall at about eye level. Keep your injured leg back with your heel on the floor. Keep the other leg forward with the knee bent. Turn your back foot slightly inward (as if you were pigeon-toed). Slowly lean into the wall until you feel a stretch in the back of your calf. Hold the stretch for 15 to 30 seconds. Return to the starting position. Repeat 3 times. Do this exercise several times each day.    Ankle range of motion: Sit or lie down with your legs straight and your knees pointing toward the ceiling. Point your toes on your injured side toward your nose, then away from your body. Point your toes in toward your other foot and then out away from your other foot. Finally, move the top of your foot in circles. Move only your foot and ankle. Don't move your leg. Repeat 10 times in each direction. Push hard in all directions.    After a couple days of stretching (pain free walking and pain free stretching), you can begin strengthening your calf and lower leg muscles using elastic tubing as described in the next exercise.    Resisted ankle plantar flexion: Sit with your injured leg stretched out in front  "of you. Loop the tubing around the ball of your foot. Hold the ends of the tubing with both hands. Gently press the ball of your foot down and point your toes, stretching the tubing. Return to the starting position. Do 2 sets of 15.  You may do the last 4 exercises when you can stand on your toes without pain.    Heel raise: Stand behind a chair or counter with both feet flat on the floor. Using the chair or counter as a support, rise up onto your toes and hold for 5 seconds. Then slowly lower yourself down without holding onto the support. (It's OK to keep holding onto the support if you need to.) When this exercise becomes less painful, try doing this exercise while you are standing on the injured leg only. Repeat 15 times. Do 2 sets of 15. Rest 30 seconds between sets.  You can challenge yourself by standing on just your injured leg as you do this exercise.    Single leg balance: Stand without any support and try to balance on your injured leg. Keep standing on the one leg for 30 seconds. Repeat 3 times. Begin with your eyes open and then try to do the exercise with your eyes closed. When you have mastered this, try doing the exercise standing on a pillow.    Nose touch: Stand on one leg facing a wall. Stand 4 inches (10 centimeters) from the wall. Keep your body and leg straight. Slowly lean forward, trying to touch your nose to the wall and then return to the starting position. Make sure you do not bend forward at your waist. Do 2 sets of 10.    Wall jump: Face a wall and place a piece of masking tape about 2 feet (50 to 60 centimeters) above your head. Jump up with your arms above your head and try to touch the piece of tape. Make sure you do a \"spring\" type of motion and try to land softly on your feet. As the exercise gets easier, jump on just your injured leg. Do 2 sets of 15.    Side-lying leg lift: Lie on your uninjured side. Tighten the front thigh muscles on your injured leg and lift that leg 8 to 10 " inches (20 to 25 centimeters) away from the other leg. Keep the leg straight and lower it slowly. Do 2 sets of 15.    When you can do these exercises without pain, you can start a light jogging program. You can return to your sport when there is no difference between your injured side and noninjured side when you do the exercises.    Developed by [employer].  Published by [employer].  Copyright  2014 [employer] and/or one of its subsidiaries. All rights reserved.

## 2023-11-22 ENCOUNTER — VIRTUAL VISIT (OUTPATIENT)
Dept: ORTHOPEDICS | Facility: CLINIC | Age: 68
End: 2023-11-22
Payer: COMMERCIAL

## 2023-11-22 DIAGNOSIS — S86.811A STRAIN OF RIGHT CALF MUSCLE: Primary | ICD-10-CM

## 2023-11-22 PROCEDURE — 99442 PR PHYSICIAN TELEPHONE EVALUATION 11-20 MIN: CPT | Performed by: PREVENTIVE MEDICINE

## 2023-11-22 NOTE — LETTER
11/22/2023         RE: Rasheed Zimmerman  [address]  [place] [address]        Dear Colleague,    Thank you for referring your patient, Rasheed Zimmerman, to the [facility] [place]. Please see a copy of my visit note below.    Patient is a   67  year old who is being evaluated via a billable telephone visit.      What phone number would you like to be contacted at? CELL  How would you like to obtain your AVS? MYCHART        Subjective   Patient is a   67  year old who presents by phone call visit for the following:     HPI   Followup for left calf strain  Improving  Boot has helped  No pain walking but with excessive stretching    Review of Systems   Constitutional, HEENT, cardiovascular, pulmonary, gi and gu systems are negative, except as otherwise noted.      Objective           Vitals:  No vitals were obtained today due to virtual visit.    Physical Exam   healthy, alert, and no distress  PSYCH: Alert and oriented times 3; coherent speech, normal   rate and volume, able to articulate logical thoughts, able   to abstract reason, no tangential thoughts, no hallucinations   or delusions  His affect is normal  RESP: No cough, no audible wheezing, able to talk in full sentences  Remainder of exam unable to be completed due to telephone visits    Assessment/Plan  68 yo male with left calf strain, improving    We discussed stopping use of boot  And activities as tolerated  Cont. HEP  And celebrex PRN  Followup in 1-2 weeks if worsens          Phone call duration: 20 minutes  Phone call start: 420pm  Phone call end: 440pm  Dr Chaney      Again, thank you for allowing me to participate in the care of your patient.        Sincerely,        Ochoa Chaney MD

## 2023-11-22 NOTE — LETTER
11/22/2023         RE: Rasheed Zimmerman  [address]  [place] [address]        Dear Colleague,    Thank you for referring your patient, Rasheed Zimmerman, to the [facility] [place]. Please see a copy of my visit note below.    Patient is a   67  year old who is being evaluated via a billable telephone visit.      What phone number would you like to be contacted at? CELL  How would you like to obtain your AVS? MYCHART        Subjective   Patient is a   67  year old who presents by phone call visit for the following:     HPI   Followup for left calf strain  Improving  Boot has helped  No pain walking but with excessive stretching    Review of Systems   Constitutional, HEENT, cardiovascular, pulmonary, gi and gu systems are negative, except as otherwise noted.      Objective           Vitals:  No vitals were obtained today due to virtual visit.    Physical Exam   healthy, alert, and no distress  PSYCH: Alert and oriented times 3; coherent speech, normal   rate and volume, able to articulate logical thoughts, able   to abstract reason, no tangential thoughts, no hallucinations   or delusions  His affect is normal  RESP: No cough, no audible wheezing, able to talk in full sentences  Remainder of exam unable to be completed due to telephone visits    Assessment/Plan  66 yo male with left calf strain, improving    We discussed stopping use of boot  And activities as tolerated  Cont. HEP  And celebrex PRN  Followup in 1-2 weeks if worsens          Phone call duration: 20 minutes  Phone call start: 420pm  Phone call end: 440pm  Dr Chaney      Again, thank you for allowing me to participate in the care of your patient.        Sincerely,        Ochoa Chaney MD

## 2023-11-22 NOTE — PROGRESS NOTES
Patient is a   67  year old who is being evaluated via a billable telephone visit.      What phone number would you like to be contacted at? CELL  How would you like to obtain your AVS? MOHINDER        Subjective   Patient is a   67  year old who presents by phone call visit for the following:     HPI   Followup for rightt calf strain  Improving  Boot has helped  No pain walking but with excessive stretching    Review of Systems   Constitutional, HEENT, cardiovascular, pulmonary, gi and gu systems are negative, except as otherwise noted.      Objective           Vitals:  No vitals were obtained today due to virtual visit.    Physical Exam   healthy, alert, and no distress  PSYCH: Alert and oriented times 3; coherent speech, normal   rate and volume, able to articulate logical thoughts, able   to abstract reason, no tangential thoughts, no hallucinations   or delusions  His affect is normal  RESP: No cough, no audible wheezing, able to talk in full sentences  Remainder of exam unable to be completed due to telephone visits    Assessment/Plan  68 yo male with right calf strain, improving    We discussed stopping use of boot  And activities as tolerated  Cont. HEP  And celebrex PRN  Followup in 1-2 weeks if worsens          Phone call duration: 20 minutes  Phone call start: 420pm  Phone call end: 440pm  Dr Chaney

## 2023-12-20 ENCOUNTER — OFFICE VISIT (OUTPATIENT)
Dept: ORTHOPEDICS | Facility: CLINIC | Age: 68
End: 2023-12-20
Payer: COMMERCIAL

## 2023-12-20 ENCOUNTER — ANCILLARY PROCEDURE (OUTPATIENT)
Dept: GENERAL RADIOLOGY | Facility: CLINIC | Age: 68
End: 2023-12-20
Attending: PREVENTIVE MEDICINE
Payer: COMMERCIAL

## 2023-12-20 DIAGNOSIS — M54.12 CERVICAL RADICULAR PAIN: Primary | ICD-10-CM

## 2023-12-20 DIAGNOSIS — M79.631 RIGHT FOREARM PAIN: ICD-10-CM

## 2023-12-20 DIAGNOSIS — M50.30 DDD (DEGENERATIVE DISC DISEASE), CERVICAL: ICD-10-CM

## 2023-12-20 DIAGNOSIS — M54.12 CERVICAL RADICULAR PAIN: ICD-10-CM

## 2023-12-20 DIAGNOSIS — M50.10 HERNIATION OF CERVICAL INTERVERTEBRAL DISC WITH RADICULOPATHY: ICD-10-CM

## 2023-12-20 PROCEDURE — 99214 OFFICE O/P EST MOD 30 MIN: CPT | Performed by: PREVENTIVE MEDICINE

## 2023-12-20 PROCEDURE — 72040 X-RAY EXAM NECK SPINE 2-3 VW: CPT | Mod: GC | Performed by: RADIOLOGY

## 2023-12-20 PROCEDURE — 73090 X-RAY EXAM OF FOREARM: CPT | Mod: RT | Performed by: RADIOLOGY

## 2023-12-20 RX ORDER — METHYLPREDNISOLONE 4 MG
TABLET, DOSE PACK ORAL
Qty: 21 TABLET | Refills: 0 | Status: SHIPPED | OUTPATIENT
Start: 2023-12-20

## 2023-12-20 NOTE — LETTER
12/20/2023         RE: Rasheed Zimmerman  [address] [address]        Dear Colleague,    Thank you for referring your patient, Rasheed Zimmerman, to the [facility] [place]. Please see a copy of my visit note below.    HISTORY OF PRESENT ILLNESS  Mr. Zimmerman is a pleasant 67 year old year old male who presents to clinic today with the following:  What problem are you here for? Right forearm weakness, numbness   Elbow pain  Radiating pain down arm  How long have you had this problem? 6 months     Have you had any recent imaging of this problem? Xrays/MRI/CT scans? no    Have you had treatments for this problem in the past?  -Medications? no  -Physical therapy? no  -Injections? no  -Surgery? no    How severe is this problem today? 0-10 scale? Na     How severe has this problem been at WORST in the past? 0-10 scale? Na     What do you think caused this problem? Is unable to grab or use his arm like he wants to     Does this problem or its symptoms cause difficulty for you falling asleep or staying asleep? Occasionally     Anything else you want us to know about this problem? no          MEDICAL HISTORY  Patient Active Problem List   Diagnosis     Sinusitis, chronic     Thoracic or lumbosacral neuritis or radiculitis, unspecified     Moderate persistent asthma in adult without complication     Actinic keratosis     Hyperlipidemia LDL goal <130     Advanced directives, counseling/discussion     Microscopic hematuria     Calculus of kidney     Cervicalgia     Mild intermittent asthma without complication     Cervical spondylosis without myelopathy     BPH with obstruction/lower urinary tract symptoms     Allergic rhinitis due to pollen     H/O adenomatous polyp of colon     Hip pain, left       Current Outpatient Medications   Medication Sig Dispense Refill     albuterol (VENTOLIN HFA) 108 (90 Base) MCG/ACT inhaler INHALE 2 PUFFS INTO THE LUNGS EVERY 6 HOURS 18 g 1      alfuzosin ER (UROXATRAL) 10 MG 24 hr tablet Take 1 tablet (10 mg) by mouth daily 30 tablet 11     celecoxib (CELEBREX) 200 MG capsule TAKE 1 CAPSULE(200 MG) BY MOUTH IN THE MORNING 90 capsule 1     fluticasone (FLONASE) 50 MCG/ACT nasal spray Spray 1 spray into both nostrils daily       fluticasone-vilanterol (BREO ELLIPTA) 200-25 MCG/ACT inhaler Inhale 1 puff into the lungs daily 1 each 11     simvastatin (ZOCOR) 40 MG tablet TAKE 1 TABLET(40 MG) BY MOUTH AT BEDTIME 90 tablet 1     VITAMIN D PO Take 2,000 Int'l Units/day by mouth         Allergies   Allergen Reactions     Dust Mites        Family History   Problem Relation Age of Onset     Asthma Father      Alzheimer Disease Paternal Grandmother         alive at 90     Asthma Paternal Grandmother      C.A.D. Paternal Grandfather          86 YOA, [place]     Diabetes Paternal Grandfather         Type 2     Neurologic Disorder Paternal Grandfather         zenobia gehrig's disease,  at 64 YOA     Coronary Artery Disease Paternal Grandfather      Asthma Son      Social History     Socioeconomic History     Marital status:    Tobacco Use     Smoking status: Never     Passive exposure: Never     Smokeless tobacco: Never   Vaping Use     Vaping Use: Never used   Substance and Sexual Activity     Alcohol use: No     Drug use: No     Sexual activity: Yes     Partners: Female   Other Topics Concern     Parent/sibling w/ CABG, MI or angioplasty before 65F 55M? No       Additional medical/Social/Surgical histories reviewed in [facility] and updated as appropriate.     REVIEW OF SYSTEMS (2023)  10 point ROS of systems including Constitutional, Eyes, Respiratory, Cardiovascular, Gastroenterology, Genitourinary, Integumentary, Musculoskeletal, Psychiatric, Allergic/Immunologic were all negative except for pertinent positives noted in my HPI.     PHYSICAL EXAM  VSS  Vital Signs: There were no vitals taken for this visit. Patient declined being weighed. There is  no height or weight on file to calculate BMI.    General  - normal appearance, in no obvious distress  HEENT  - conjunctivae not injected, moist mucous membranes, normocephalic/atraumatic head, ears normal appearance, no lesions, mouth normal appearance, no scars, normal dentition and teeth present  CV  - normal peripheral perfusion  Pulm  - normal respiratory pattern, non-labored    Musculoskeletal - CERVICAL SPINE  - stance and posture: normal gait without limp, no obvious leg length discrepancy, normal heel and toe walk, normal balance, slightly forward shoulders, head balanced normally on trunk  - inspection: normal bone and joint alignment, no obvious kyphosis  - palpation: no paravertebral or bony tenderness, except at base of neck and trapezius muscles  - ROM: normal and painless flexion, extension, left and right sidebending and rotation with some discomfort  - strength: upper extremities 5/5 in all planes  - special tests:  (+) spurlings    Neuro  - biceps, triceps, supinator DTRs 2+ bilaterally, no sensory or motor deficit, grossly normal coordination, normal muscle tone  Skin  - no ecchymosis, erythema, warmth, or induration, no obvious rash  Psych  - interactive, appropriate, normal mood and affect  Right elbow: has pain with flexion/extension and ttp over lateral epicondyle    ASSESSMENT & PLAN  69 yo male with right elbow , arm and neck pain due to cervical radiculopathy, ddd and lateral epicondylitis    I independently reviewed the following imaging studies:  Cervical xray: shows ddd  Right elbow xray: shows no abnormalities  Discussed and ordered cervical MRI  Given rX for medrol pack  Given HEP  Followup after MRI  Patient has been doing home exercise physical therapy program for this problem      Appropriate PPE was utilized for prevention of spread of Covid-19.  Ochoa Chaney MD, CAQSM        Again, thank you for allowing me to participate in the care of your patient.         Sincerely,        Ochoa Chaney MD

## 2023-12-20 NOTE — PROGRESS NOTES
HISTORY OF PRESENT ILLNESS  Mr. Zimmerman is a pleasant 67 year old year old male who presents to clinic today with the following:  What problem are you here for? Right forearm weakness, numbness   Elbow pain  Radiating pain down arm  How long have you had this problem? 6 months     Have you had any recent imaging of this problem? Xrays/MRI/CT scans? no    Have you had treatments for this problem in the past?  -Medications? no  -Physical therapy? no  -Injections? no  -Surgery? no    How severe is this problem today? 0-10 scale? Na     How severe has this problem been at WORST in the past? 0-10 scale? Na     What do you think caused this problem? Is unable to grab or use his arm like he wants to     Does this problem or its symptoms cause difficulty for you falling asleep or staying asleep? Occasionally     Anything else you want us to know about this problem? no          MEDICAL HISTORY  Patient Active Problem List   Diagnosis    Sinusitis, chronic    Thoracic or lumbosacral neuritis or radiculitis, unspecified    Moderate persistent asthma in adult without complication    Actinic keratosis    Hyperlipidemia LDL goal <130    Advanced directives, counseling/discussion    Microscopic hematuria    Calculus of kidney    Cervicalgia    Mild intermittent asthma without complication    Cervical spondylosis without myelopathy    BPH with obstruction/lower urinary tract symptoms    Allergic rhinitis due to pollen    H/O adenomatous polyp of colon    Hip pain, left       Current Outpatient Medications   Medication Sig Dispense Refill    albuterol (VENTOLIN HFA) 108 (90 Base) MCG/ACT inhaler INHALE 2 PUFFS INTO THE LUNGS EVERY 6 HOURS 18 g 1    alfuzosin ER (UROXATRAL) 10 MG 24 hr tablet Take 1 tablet (10 mg) by mouth daily 30 tablet 11    celecoxib (CELEBREX) 200 MG capsule TAKE 1 CAPSULE(200 MG) BY MOUTH IN THE MORNING 90 capsule 1    fluticasone (FLONASE) 50 MCG/ACT nasal spray Spray 1 spray into both nostrils daily       fluticasone-vilanterol (BREO ELLIPTA) 200-25 MCG/ACT inhaler Inhale 1 puff into the lungs daily 1 each 11    simvastatin (ZOCOR) 40 MG tablet TAKE 1 TABLET(40 MG) BY MOUTH AT BEDTIME 90 tablet 1    VITAMIN D PO Take 2,000 Int'l Units/day by mouth         Allergies   Allergen Reactions    Dust Mites        Family History   Problem Relation Age of Onset    Asthma Father     Alzheimer Disease Paternal Grandmother         alive at 90    Asthma Paternal Grandmother     C.A.D. Paternal Grandfather          86 YOA, [place]    Diabetes Paternal Grandfather         Type 2    Neurologic Disorder Paternal Grandfather         zenobia gehrig's disease,  at 64 YOA    Coronary Artery Disease Paternal Grandfather     Asthma Son      Social History     Socioeconomic History    Marital status:    Tobacco Use    Smoking status: Never     Passive exposure: Never    Smokeless tobacco: Never   Vaping Use    Vaping Use: Never used   Substance and Sexual Activity    Alcohol use: No    Drug use: No    Sexual activity: Yes     Partners: Female   Other Topics Concern    Parent/sibling w/ CABG, MI or angioplasty before 65F 55M? No       Additional medical/Social/Surgical histories reviewed in [facility] and updated as appropriate.     REVIEW OF SYSTEMS (2023)  10 point ROS of systems including Constitutional, Eyes, Respiratory, Cardiovascular, Gastroenterology, Genitourinary, Integumentary, Musculoskeletal, Psychiatric, Allergic/Immunologic were all negative except for pertinent positives noted in my HPI.     PHYSICAL EXAM  VSS  Vital Signs: There were no vitals taken for this visit. Patient declined being weighed. There is no height or weight on file to calculate BMI.    General  - normal appearance, in no obvious distress  HEENT  - conjunctivae not injected, moist mucous membranes, normocephalic/atraumatic head, ears normal appearance, no lesions, mouth normal appearance, no scars, normal dentition and teeth present  CV  -  normal peripheral perfusion  Pulm  - normal respiratory pattern, non-labored    Musculoskeletal - CERVICAL SPINE  - stance and posture: normal gait without limp, no obvious leg length discrepancy, normal heel and toe walk, normal balance, slightly forward shoulders, head balanced normally on trunk  - inspection: normal bone and joint alignment, no obvious kyphosis  - palpation: no paravertebral or bony tenderness, except at base of neck and trapezius muscles  - ROM: normal and painless flexion, extension, left and right sidebending and rotation with some discomfort  - strength: upper extremities 5/5 in all planes  - special tests:  (+) spurlings    Neuro  - biceps, triceps, supinator DTRs 2+ bilaterally, no sensory or motor deficit, grossly normal coordination, normal muscle tone  Skin  - no ecchymosis, erythema, warmth, or induration, no obvious rash  Psych  - interactive, appropriate, normal mood and affect  Right elbow: has pain with flexion/extension and ttp over lateral epicondyle    ASSESSMENT & PLAN  69 yo male with right elbow , arm and neck pain due to cervical radiculopathy, ddd and lateral epicondylitis    I independently reviewed the following imaging studies:  Cervical xray: shows ddd  Right elbow xray: shows no abnormalities  Discussed and ordered cervical MRI  Given rX for medrol pack  Given HEP  Followup after MRI  Patient has been doing home exercise physical therapy program for this problem      Appropriate PPE was utilized for prevention of spread of Covid-19.  Ochoa Chaney MD, [facility]

## 2024-01-11 ENCOUNTER — ANCILLARY PROCEDURE (OUTPATIENT)
Dept: MRI IMAGING | Facility: CLINIC | Age: 69
End: 2024-01-11
Attending: PREVENTIVE MEDICINE
Payer: COMMERCIAL

## 2024-01-11 DIAGNOSIS — M50.30 DDD (DEGENERATIVE DISC DISEASE), CERVICAL: ICD-10-CM

## 2024-01-11 DIAGNOSIS — M54.12 CERVICAL RADICULAR PAIN: ICD-10-CM

## 2024-01-11 DIAGNOSIS — M50.10 HERNIATION OF CERVICAL INTERVERTEBRAL DISC WITH RADICULOPATHY: ICD-10-CM

## 2024-01-11 DIAGNOSIS — M79.631 RIGHT FOREARM PAIN: ICD-10-CM

## 2024-01-11 PROCEDURE — 72141 MRI NECK SPINE W/O DYE: CPT | Performed by: RADIOLOGY

## 2024-01-17 ENCOUNTER — TELEPHONE (OUTPATIENT)
Dept: ORTHOPEDICS | Facility: CLINIC | Age: 69
End: 2024-01-17
Payer: COMMERCIAL

## 2024-01-19 ENCOUNTER — TELEPHONE (OUTPATIENT)
Dept: MEDSURG UNIT | Facility: CLINIC | Age: 69
End: 2024-01-19
Payer: COMMERCIAL

## 2024-01-24 RX ORDER — NICOTINE POLACRILEX 4 MG
15-30 LOZENGE BUCCAL
Status: DISCONTINUED | OUTPATIENT
Start: 2024-01-24 | End: 2024-01-26 | Stop reason: HOSPADM

## 2024-01-24 RX ORDER — DEXTROSE MONOHYDRATE 25 G/50ML
25-50 INJECTION, SOLUTION INTRAVENOUS
Status: DISCONTINUED | OUTPATIENT
Start: 2024-01-24 | End: 2024-01-26 | Stop reason: HOSPADM

## 2024-01-25 ENCOUNTER — HOSPITAL ENCOUNTER (OUTPATIENT)
Facility: CLINIC | Age: 69
Discharge: HOME | End: 2024-01-25
Admitting: PHYSICIAN ASSISTANT
Payer: COMMERCIAL

## 2024-01-25 ENCOUNTER — HOSPITAL ENCOUNTER (OUTPATIENT)
Dept: GENERAL RADIOLOGY | Facility: CLINIC | Age: 69
Discharge: HOME | End: 2024-01-25
Attending: PREVENTIVE MEDICINE
Payer: COMMERCIAL

## 2024-01-25 VITALS — DIASTOLIC BLOOD PRESSURE: 89 MMHG | SYSTOLIC BLOOD PRESSURE: 144 MMHG | OXYGEN SATURATION: 97 %

## 2024-01-25 VITALS
DIASTOLIC BLOOD PRESSURE: 88 MMHG | SYSTOLIC BLOOD PRESSURE: 145 MMHG | HEART RATE: 62 BPM | OXYGEN SATURATION: 98 % | RESPIRATION RATE: 16 BRPM

## 2024-01-25 DIAGNOSIS — M50.30 DDD (DEGENERATIVE DISC DISEASE), CERVICAL: ICD-10-CM

## 2024-01-25 DIAGNOSIS — M50.10 HERNIATION OF CERVICAL INTERVERTEBRAL DISC WITH RADICULOPATHY: ICD-10-CM

## 2024-01-25 DIAGNOSIS — M54.12 CERVICAL RADICULAR PAIN: ICD-10-CM

## 2024-01-25 PROCEDURE — 250N000009 HC RX 250: Performed by: PREVENTIVE MEDICINE

## 2024-01-25 PROCEDURE — 272N000512 XR CERVICAL/THORACIC EPIDURAL INJ INCL IMAGING

## 2024-01-25 PROCEDURE — 999N000154 HC STATISTIC RADIOLOGY XRAY, US, CT, MAR, NM

## 2024-01-25 PROCEDURE — 250N000011 HC RX IP 250 OP 636: Performed by: PREVENTIVE MEDICINE

## 2024-01-25 PROCEDURE — 255N000002 HC RX 255 OP 636: Performed by: PREVENTIVE MEDICINE

## 2024-01-25 RX ORDER — LIDOCAINE HYDROCHLORIDE 10 MG/ML
30 INJECTION, SOLUTION EPIDURAL; INFILTRATION; INTRACAUDAL; PERINEURAL ONCE
Status: COMPLETED | OUTPATIENT
Start: 2024-01-25 | End: 2024-01-25

## 2024-01-25 RX ORDER — IOPAMIDOL 408 MG/ML
10 INJECTION, SOLUTION INTRATHECAL ONCE
Status: COMPLETED | OUTPATIENT
Start: 2024-01-25 | End: 2024-01-25

## 2024-01-25 RX ORDER — BETAMETHASONE SODIUM PHOSPHATE AND BETAMETHASONE ACETATE 3; 3 MG/ML; MG/ML
5 INJECTION, SUSPENSION INTRA-ARTICULAR; INTRALESIONAL; INTRAMUSCULAR; SOFT TISSUE ONCE
Status: COMPLETED | OUTPATIENT
Start: 2024-01-25 | End: 2024-01-25

## 2024-01-25 RX ADMIN — BETAMETHASONE SODIUM PHOSPHATE AND BETAMETHASONE ACETATE 3 ML: 3; 3 INJECTION, SUSPENSION INTRA-ARTICULAR; INTRALESIONAL; INTRAMUSCULAR at 08:35

## 2024-01-25 RX ADMIN — IOPAMIDOL 1 ML: 408 INJECTION, SOLUTION INTRATHECAL at 08:35

## 2024-01-25 RX ADMIN — LIDOCAINE HYDROCHLORIDE 2 ML: 10 INJECTION, SOLUTION EPIDURAL; INFILTRATION; INTRACAUDAL; PERINEURAL at 08:34

## 2024-01-25 ASSESSMENT — ACTIVITIES OF DAILY LIVING (ADL): ADLS_ACUITY_SCORE: 35

## 2024-01-25 NOTE — PROGRESS NOTES
Care Suites Discharge Nursing Note    Patient Information  Name: Rasheed Zimmerman  Age: 68 year old    Discharge Education:  Discharge instructions reviewed: Yes  Additional education/resources provided: none  Patient/patient representative verbalizes understanding: Yes  Patient discharging on new medications: No  Medication education completed: N/A    Discharge Plans:   Discharge location: home  Discharge ride contacted: Yes - [place] wife with patient   Approximate discharge time: 0910    Discharge Criteria:  Discharge criteria met and vital signs stable: Yes  Band aid continues to be dry intact   Patient denies any pain or Numbness/tingling - continues to have some weakness unchanged     Patient Belongs:  Patient belongings returned to patient: Yes    Rasheed Ray RN

## 2024-01-25 NOTE — DISCHARGE INSTRUCTIONS
Steroid Injection Discharge Instructions     After you go home:    You may resume your normal diet.    Care of Puncture Site:    If you have a bandaid on your puncture site, you may remove it the next morning  You may shower tomorrow  No bath tubs, whirlpools or swimming pool for at least 48 hours  Use ice packs as needed for discomfort     Activity:    Minimize your activity today. You may gradually resume your normal activity as tolerated  Avoid vigorous or strenuous activity until your symptoms improve or as directed by your doctor  Do NOT drive a vehicle for a few hours after the injection - or longer if you develop numbness in your arm or leg    Medicines:    You may resume all medications, including blood thinners  Resume your Platelet Inhibitors and Aspirin tomorrow at your regular dose  For minor discomfort, you may take Acetaminophen (Tylenol) or Ibuprofen (Advil)    Pain:     You may experience increased or different pain over the next 24-48 hours  For the next 48 hrs - you may use ice packs for discomfort     Call your primary care doctor if:    You have severe pain that does not improve with pain medication  You have chills or a fever greater than 101 F (38 C)  The site is red, swollen, hot or tender  Increase in pain, weakness or numbness  New problems with your bowel or bladder  Any questions or concerns    What to watch for:    It can be normal to have some bruising or slight swelling at the puncture site.   After the procedure, you may have some new weakness or numbness down your arm/leg from the numbing medicine. This should resolve in a few hours.   You may feel some temporary relief from the numbing medicine, but that will wear off within a few hours.  Your symptoms may return to pre procedure level, or can even be worse for the first 1-2 days.  For many people, the steroid begins to provide some relief within 2-3 days, but it can take up to 2 weeks to obtain the full results.  Some people will  get lasting relief from a single injection. Others may require up to 3 injections to get results. If you have more than one steroid injection, they should be given 2 weeks apart.  If you have no improvement in your symptoms after two weeks, please contact the doctor who ordered this procedure to discuss the next steps.  Side effects of your steroid injection are mild and will go away in 2-3 days  Insomnia  Irritability  Flushed face  Water retention  Restlessness  Difficulty sleeping  Increased appetite  Increased blood sugar  If you are diabetic, monitor your blood sugar closely. Contact the provider who manages your diabetes to help you control your blood sugar if needed.    If you have questions or concerns call:                  [facility] Radiology Dept @ [phone]                                    between 8am-4:30pm Mon-Fri    If you have urgent questions outside of these normal business hours, please contact the [facility] on call doctor @ [phone]      The provider who performed your procedure was ______Shyam SPRINGER__________.

## 2024-02-13 ENCOUNTER — PATIENT OUTREACH (OUTPATIENT)
Dept: GASTROENTEROLOGY | Facility: CLINIC | Age: 69
End: 2024-02-13
Payer: COMMERCIAL

## 2024-02-13 DIAGNOSIS — Z12.11 SPECIAL SCREENING FOR MALIGNANT NEOPLASMS, COLON: Primary | ICD-10-CM

## 2024-02-13 NOTE — PROGRESS NOTES
"Patient has a history of polyps and surveillance colonoscopy is due May 2024. Patient meets criteria for CRC high risk standing order protocol.    CRC Screening Colonoscopy Referral Review    Patient meets the inclusion criteria for screening colonoscopy standing order.    Ordering/Referring Provider:  Kristopher Jovel MD    BMI: Estimated body mass index is 29.79 kg/m  as calculated from the following:    Height as of 6/2/23: 1.778 m (5' 10\").    Weight as of 6/16/23: 94.2 kg (207 lb 9.6 oz).     Sedation:  Does patient have any of the following conditions affecting sedation?  No medical conditions affecting sedation.    Previous Scopes:  Any previous recommendations or follow up needs based on previous scope?  na / No recommendations.    Medical Concerns to Postpone Order:  Does patient have any of the following medical concerns that should postpone/delay colonoscopy referral?  No medical conditions affecting colonoscopy referral.    Final Referral Details:  Based on patient's medical history patient is appropriate for referral order with moderate sedation. If patient's BMI > 50 do not schedule in ASC.  "

## 2024-02-14 DIAGNOSIS — M47.22 OSTEOARTHRITIS OF SPINE WITH RADICULOPATHY, CERVICAL REGION: ICD-10-CM

## 2024-02-14 RX ORDER — CELECOXIB 200 MG/1
CAPSULE ORAL
Qty: 90 CAPSULE | Refills: 1 | Status: SHIPPED | OUTPATIENT
Start: 2024-02-14

## 2024-02-19 ENCOUNTER — TELEPHONE (OUTPATIENT)
Dept: FAMILY MEDICINE | Facility: CLINIC | Age: 69
End: 2024-02-19

## 2024-02-19 ENCOUNTER — OFFICE VISIT (OUTPATIENT)
Dept: ORTHOPEDICS | Facility: CLINIC | Age: 69
End: 2024-02-19
Payer: COMMERCIAL

## 2024-02-19 DIAGNOSIS — M50.30 DDD (DEGENERATIVE DISC DISEASE), CERVICAL: Primary | ICD-10-CM

## 2024-02-19 DIAGNOSIS — M50.10 HERNIATION OF CERVICAL INTERVERTEBRAL DISC WITH RADICULOPATHY: ICD-10-CM

## 2024-02-19 DIAGNOSIS — R29.898 RIGHT ARM WEAKNESS: ICD-10-CM

## 2024-02-19 PROCEDURE — 99214 OFFICE O/P EST MOD 30 MIN: CPT | Performed by: PREVENTIVE MEDICINE

## 2024-02-19 RX ORDER — METHOCARBAMOL 500 MG/1
500 TABLET, FILM COATED ORAL 3 TIMES DAILY PRN
Qty: 30 TABLET | Refills: 0 | Status: SHIPPED | OUTPATIENT
Start: 2024-02-19

## 2024-02-19 NOTE — LETTER
2/19/2024         RE: Rasheed Zimmerman  [address] [facility] [address]        Dear Colleague,    Thank you for referring your patient, Rasheed Zimmerman, to the [facility] [place]. Please see a copy of my visit note below.    HISTORY OF PRESENT ILLNESS  Mr. Zimmerman is a pleasant 68 year old year old male who presents to clinic today with the following:  What problem are you here for? Follow up to Cervical Injection 1/25, Right calf pain  Right arm and neck pain has not resolved since his PB  Having more significant weakness at times in right arm  How long have you had this problem? Chronic, around 2 months after doing some heavy activity at work    Have you had any recent imaging of this problem? Xrays/MRI/CT scans? No    Have you had treatments for this problem in the past?  -Medications? Yes  -Physical therapy? Yes  -Injections? Yes  -Surgery? No    How severe is this problem today? 0-10 scale? 4    How severe has this problem been at WORST in the past? 0-10 scale? 7    What do you think caused this problem? Was at work and thinks he did something to it while healing    Does this problem or its symptoms cause difficulty for you falling asleep or staying asleep? No    Anything else you want us to know about this problem? No          MEDICAL HISTORY  Patient Active Problem List   Diagnosis     Sinusitis, chronic     Thoracic or lumbosacral neuritis or radiculitis, unspecified     Moderate persistent asthma in adult without complication     Actinic keratosis     Hyperlipidemia LDL goal <130     Advanced directives, counseling/discussion     Microscopic hematuria     Calculus of kidney     Cervicalgia     Mild intermittent asthma without complication     Cervical spondylosis without myelopathy     BPH with obstruction/lower urinary tract symptoms     Allergic rhinitis due to pollen     H/O adenomatous polyp of colon     Hip pain, left       Current Outpatient Medications    Medication Sig Dispense Refill     albuterol (VENTOLIN HFA) 108 (90 Base) MCG/ACT inhaler INHALE 2 PUFFS INTO THE LUNGS EVERY 6 HOURS 18 g 1     alfuzosin ER (UROXATRAL) 10 MG 24 hr tablet Take 1 tablet (10 mg) by mouth daily 30 tablet 11     celecoxib (CELEBREX) 200 MG capsule TAKE 1 CAPSULE(200 MG) BY MOUTH EVERY MORNING 90 capsule 1     fluticasone (FLONASE) 50 MCG/ACT nasal spray Spray 1 spray into both nostrils daily       fluticasone-vilanterol (BREO ELLIPTA) 200-25 MCG/ACT inhaler Inhale 1 puff into the lungs daily 1 each 11     methylPREDNISolone (MEDROL) 4 MG tablet therapy pack Follow Package Directions 21 tablet 0     simvastatin (ZOCOR) 40 MG tablet TAKE 1 TABLET(40 MG) BY MOUTH AT BEDTIME 90 tablet 1     VITAMIN D PO Take 2,000 Int'l Units/day by mouth         Allergies   Allergen Reactions     Dust Mites        Family History   Problem Relation Age of Onset     Asthma Father      Alzheimer Disease Paternal Grandmother         alive at 90     Asthma Paternal Grandmother      C.A.D. Paternal Grandfather          86 YOA, [place]     Diabetes Paternal Grandfather         Type 2     Neurologic Disorder Paternal Grandfather         zenobia gehrig's disease,  at 64 YOA     Coronary Artery Disease Paternal Grandfather      Asthma Son      Social History     Socioeconomic History     Marital status:    Tobacco Use     Smoking status: Never     Passive exposure: Never     Smokeless tobacco: Never   Vaping Use     Vaping Use: Never used   Substance and Sexual Activity     Alcohol use: No     Drug use: No     Sexual activity: Yes     Partners: Female   Other Topics Concern     Parent/sibling w/ CABG, MI or angioplasty before 65F 55M? No       Additional medical/Social/Surgical histories reviewed in [facility] and updated as appropriate.     REVIEW OF SYSTEMS (2024)  10 point ROS of systems including Constitutional, Eyes, Respiratory, Cardiovascular, Gastroenterology, Genitourinary, Integumentary,  Musculoskeletal, Psychiatric, Allergic/Immunologic were all negative except for pertinent positives noted in my HPI.     PHYSICAL EXAM  VSS    General  - normal appearance, in no obvious distress  HEENT  - conjunctivae not injected, moist mucous membranes, normocephalic/atraumatic head, ears normal appearance, no lesions, mouth normal appearance, no scars, normal dentition and teeth present  CV  - normal peripheral perfusion  Pulm  - normal respiratory pattern, non-labored    Musculoskeletal - CERVICAL SPINE  - stance and posture: normal gait without limp, no obvious leg length discrepancy, normal heel and toe walk, normal balance, slightly forward shoulders, head balanced normally on trunk  - inspection: normal bone and joint alignment, no obvious kyphosis  - palpation: no paravertebral or bony tenderness, except at base of neck and trapezius muscles  - ROM: normal and painless flexion, extension, left and right sidebending and rotation with some discomfort  - strength: upper extremities 5/5 in all planes  - special tests:  (+) spurlings    Neuro  - biceps, triceps, supinator DTRs 2+ bilaterally, no sensory or motor deficit, grossly normal coordination, normal muscle tone, except for some decreased sensation in right arm, and weakness in extensor forearm compartment of right arm  Skin  - no ecchymosis, erythema, warmth, or induration, no obvious rash  Psych  - interactive, appropriate, normal mood and affect    ASSESSMENT & PLAN  67 yo male with cervical ddd, disc herniations, radiculopathy, causing weakness and decreased sensation in right arm    I independently reviewed the following imaging studies:  Cervical MRI: shows ddd,disc herniations, stenosis  Discussed and will refer to dr spring for further evaluation  Cont. HEP  Cont. Medications as written  Rx given for methocarbamol  Followup after Dr Spring visit  Patient has been doing home exercise physical therapy program for this problem      Appropriate PPE  was utilized for prevention of spread of Covid-19.  Ochoa Chaney MD, [facility]        Again, thank you for allowing me to participate in the care of your patient.        Sincerely,        Ochoa Chaney MD

## 2024-02-19 NOTE — TELEPHONE ENCOUNTER
DIAGNOSIS: Cervical Spine    APPOINTMENT DATE: 02/20/2024   NOTES STATUS DETAILS   OFFICE NOTE from referring provider Internal 02/19/2024 - Ochoa Chaney MD - [facility]   INJECTIONS DONE IN RADIOLOGY Internal    IMAGING Internal

## 2024-02-19 NOTE — PROGRESS NOTES
HISTORY OF PRESENT ILLNESS  Mr. Zimmerman is a pleasant 68 year old year old male who presents to clinic today with the following:  What problem are you here for? Follow up to Cervical Injection 1/25, Right calf pain  Right arm and neck pain has not resolved since his PB  Having more significant weakness at times in right arm  How long have you had this problem? Chronic, around 2 months after doing some heavy activity at work    Have you had any recent imaging of this problem? Xrays/MRI/CT scans? No    Have you had treatments for this problem in the past?  -Medications? Yes  -Physical therapy? Yes  -Injections? Yes  -Surgery? No    How severe is this problem today? 0-10 scale? 4    How severe has this problem been at WORST in the past? 0-10 scale? 7    What do you think caused this problem? Was at work and thinks he did something to it while healing    Does this problem or its symptoms cause difficulty for you falling asleep or staying asleep? No    Anything else you want us to know about this problem? No          MEDICAL HISTORY  Patient Active Problem List   Diagnosis    Sinusitis, chronic    Thoracic or lumbosacral neuritis or radiculitis, unspecified    Moderate persistent asthma in adult without complication    Actinic keratosis    Hyperlipidemia LDL goal <130    Advanced directives, counseling/discussion    Microscopic hematuria    Calculus of kidney    Cervicalgia    Mild intermittent asthma without complication    Cervical spondylosis without myelopathy    BPH with obstruction/lower urinary tract symptoms    Allergic rhinitis due to pollen    H/O adenomatous polyp of colon    Hip pain, left       Current Outpatient Medications   Medication Sig Dispense Refill    albuterol (VENTOLIN HFA) 108 (90 Base) MCG/ACT inhaler INHALE 2 PUFFS INTO THE LUNGS EVERY 6 HOURS 18 g 1    alfuzosin ER (UROXATRAL) 10 MG 24 hr tablet Take 1 tablet (10 mg) by mouth daily 30 tablet 11    celecoxib (CELEBREX) 200 MG capsule TAKE 1  CAPSULE(200 MG) BY MOUTH EVERY MORNING 90 capsule 1    fluticasone (FLONASE) 50 MCG/ACT nasal spray Spray 1 spray into both nostrils daily      fluticasone-vilanterol (BREO ELLIPTA) 200-25 MCG/ACT inhaler Inhale 1 puff into the lungs daily 1 each 11    methylPREDNISolone (MEDROL) 4 MG tablet therapy pack Follow Package Directions 21 tablet 0    simvastatin (ZOCOR) 40 MG tablet TAKE 1 TABLET(40 MG) BY MOUTH AT BEDTIME 90 tablet 1    VITAMIN D PO Take 2,000 Int'l Units/day by mouth         Allergies   Allergen Reactions    Dust Mites        Family History   Problem Relation Age of Onset    Asthma Father     Alzheimer Disease Paternal Grandmother         alive at 90    Asthma Paternal Grandmother     C.A.D. Paternal Grandfather          86 YOA, [place]    Diabetes Paternal Grandfather         Type 2    Neurologic Disorder Paternal Grandfather         zenobia gehrig's disease,  at 64 YOA    Coronary Artery Disease Paternal Grandfather     Asthma Son      Social History     Socioeconomic History    Marital status:    Tobacco Use    Smoking status: Never     Passive exposure: Never    Smokeless tobacco: Never   Vaping Use    Vaping Use: Never used   Substance and Sexual Activity    Alcohol use: No    Drug use: No    Sexual activity: Yes     Partners: Female   Other Topics Concern    Parent/sibling w/ CABG, MI or angioplasty before 65F 55M? No       Additional medical/Social/Surgical histories reviewed in [facility] and updated as appropriate.     REVIEW OF SYSTEMS (2024)  10 point ROS of systems including Constitutional, Eyes, Respiratory, Cardiovascular, Gastroenterology, Genitourinary, Integumentary, Musculoskeletal, Psychiatric, Allergic/Immunologic were all negative except for pertinent positives noted in my HPI.     PHYSICAL EXAM  VSS    General  - normal appearance, in no obvious distress  HEENT  - conjunctivae not injected, moist mucous membranes, normocephalic/atraumatic head, ears normal appearance,  no lesions, mouth normal appearance, no scars, normal dentition and teeth present  CV  - normal peripheral perfusion  Pulm  - normal respiratory pattern, non-labored    Musculoskeletal - CERVICAL SPINE  - stance and posture: normal gait without limp, no obvious leg length discrepancy, normal heel and toe walk, normal balance, slightly forward shoulders, head balanced normally on trunk  - inspection: normal bone and joint alignment, no obvious kyphosis  - palpation: no paravertebral or bony tenderness, except at base of neck and trapezius muscles  - ROM: normal and painless flexion, extension, left and right sidebending and rotation with some discomfort  - strength: upper extremities 5/5 in all planes  - special tests:  (+) spurlings    Neuro  - biceps, triceps, supinator DTRs 2+ bilaterally, no sensory or motor deficit, grossly normal coordination, normal muscle tone, except for some decreased sensation in right arm, and weakness in extensor forearm compartment of right arm  Skin  - no ecchymosis, erythema, warmth, or induration, no obvious rash  Psych  - interactive, appropriate, normal mood and affect    ASSESSMENT & PLAN  67 yo male with cervical ddd, disc herniations, radiculopathy, causing weakness and decreased sensation in right arm    I independently reviewed the following imaging studies:  Cervical MRI: shows ddd,disc herniations, stenosis  Discussed and will refer to dr spring for further evaluation  Cont. HEP  Cont. Medications as written  Rx given for methocarbamol  Followup after Dr Spring visit  Patient has been doing home exercise physical therapy program for this problem      Appropriate PPE was utilized for prevention of spread of Covid-19.  Ochoa Chaney MD, [facility]

## 2024-02-20 ENCOUNTER — PRE VISIT (OUTPATIENT)
Dept: ORTHOPEDICS | Facility: CLINIC | Age: 69
End: 2024-02-20

## 2024-02-22 DIAGNOSIS — M54.2 CERVICALGIA: Primary | ICD-10-CM

## 2024-02-27 ENCOUNTER — OFFICE VISIT (OUTPATIENT)
Dept: ORTHOPEDICS | Facility: CLINIC | Age: 69
End: 2024-02-27
Payer: COMMERCIAL

## 2024-02-27 ENCOUNTER — ANCILLARY PROCEDURE (OUTPATIENT)
Dept: GENERAL RADIOLOGY | Facility: CLINIC | Age: 69
End: 2024-02-27
Attending: ORTHOPAEDIC SURGERY
Payer: COMMERCIAL

## 2024-02-27 VITALS — WEIGHT: 203 LBS | BODY MASS INDEX: 30.07 KG/M2 | HEIGHT: 69 IN

## 2024-02-27 DIAGNOSIS — M54.2 CERVICALGIA: Primary | ICD-10-CM

## 2024-02-27 DIAGNOSIS — G56.22 CUBITAL TUNNEL SYNDROME, LEFT: ICD-10-CM

## 2024-02-27 DIAGNOSIS — M54.2 CERVICALGIA: ICD-10-CM

## 2024-02-27 DIAGNOSIS — G56.21 CUBITAL TUNNEL SYNDROME ON RIGHT: ICD-10-CM

## 2024-02-27 PROCEDURE — 72050 X-RAY EXAM NECK SPINE 4/5VWS: CPT | Performed by: RADIOLOGY

## 2024-02-27 PROCEDURE — 99204 OFFICE O/P NEW MOD 45 MIN: CPT | Mod: GC | Performed by: ORTHOPAEDIC SURGERY

## 2024-02-27 NOTE — LETTER
2/27/2024         RE: Rasheed Zimmerman  [address]  [facility] [address]        Dear Colleague,    Thank you for referring your patient, Rasheed Zimmerman, to the [facility] [place]. Please see a copy of my visit note below.    Spine Surgery Consultation    REFERRING PHYSICIAN: No ref. provider found   PRIMARY CARE PHYSICIAN: Chiara Waterman           Chief Complaint:   Consult (New patient consult for cervical spine, referred by Dr. Chaney.)      History of Present Illness:  Symptom Profile Including: location of symptoms, onset, severity, exacerbating/alleviating factors, previous treatments:        Rasheed Zimmerman is a 68 year old male who presents with acute on chronic worsening of lower cervical neck pain and right hand weakness, dexterity, and intermittent nighttime numbness.  Patient states that the symptoms began primarily in the summer of last year.  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  Additionally he complained of neck pain without radicular symptoms.  Patient had been seeing Dr. Chaney with pain management who started the patient on anti-inflammatory medication and muscle relaxants as well as recently in January performed corticosteroid injection patient had no significant relief in symptoms from that injection.          Past Medical History:     Past Medical History:   Diagnosis Date    Allergic rhinitis due to pollen 06/02/2023    Asthma     BPH with obstruction/lower urinary tract symptoms 06/02/2023    Cervical spondylosis without myelopathy 06/02/2023    Chondromalacia of patella     H/O adenomatous polyp of colon 5/13/2021    H/O nasal polyp     Hyperlipidemia     Hypertension     Microscopic hematuria 11/14/2013    Other and unspecified hyperlipidemia     Other chronic sinusitis     Polyarthropathy             Past Surgical History:      Past Surgical History:   Procedure Laterality Date    COLONOSCOPY N/A 2021    Procedure: Colonoscopy, With Polypectomy And Biopsy;  Surgeon: Laurie Garcia DO;  Location: [facility] OR    COLONOSCOPY WITH CO2 INSUFFLATION N/A 2021    Procedure: COLONOSCOPY, WITH CO2 INSUFFLATION;  Surgeon: Laurie Garcia DO;  Location: [facility] OR    ENT SURGERY      FASCIOTOMY LOWER EXTREMITY      right leg    PHACOEMULSIFICATION CLEAR CORNEA WITH STANDARD INTRAOCULAR LENS IMPLANT  2012    Procedure: PHACOEMULSIFICATION CLEAR CORNEA WITH STANDARD INTRAOCULAR LENS IMPLANT;  LEFT PHACOEMULSIFICATION CLEAR CORNEA WITH STANDARD INTRAOCULAR LENS IMPLANT ;  Surgeon: Miko Rodríguez MD;  Location:  [facility]    PHACOEMULSIFICATION CLEAR CORNEA WITH STANDARD INTRAOCULAR LENS IMPLANT  11/15/2012    Procedure: PHACOEMULSIFICATION CLEAR CORNEA WITH STANDARD INTRAOCULAR LENS IMPLANT;  RIGHT PHACOEMULSIFICATION CLEAR CORNEA WITH STANDARD INTRAOCULAR LENS IMPLANT ;  Surgeon: Miko Rodríguez MD;  Location:  [facility]    total knee              Social History:     Social History     Tobacco Use    Smoking status: Never     Passive exposure: Never    Smokeless tobacco: Never   Substance Use Topics    Alcohol use: No            Family History:     Family History   Problem Relation Age of Onset    Asthma Father     Alzheimer Disease Paternal Grandmother         alive at 90    Asthma Paternal Grandmother     C.A.D. Paternal Grandfather          86 YOA, [place]    Diabetes Paternal Grandfather         Type 2    Neurologic Disorder Paternal Grandfather         zenobia gehrig's disease,  at 64 YOA    Coronary Artery Disease Paternal Grandfather     Asthma Son             Allergies:     Allergies   Allergen Reactions    Dust Mites             Medications:     Current Outpatient Medications   Medication    albuterol (VENTOLIN HFA) 108 (90 Base) MCG/ACT inhaler    alfuzosin ER (UROXATRAL) 10 MG 24 hr tablet    celecoxib (CELEBREX)  "200 MG capsule    fluticasone (FLONASE) 50 MCG/ACT nasal spray    fluticasone-vilanterol (BREO ELLIPTA) 200-25 MCG/ACT inhaler    methocarbamol (ROBAXIN) 500 MG tablet    methylPREDNISolone (MEDROL) 4 MG tablet therapy pack    simvastatin (ZOCOR) 40 MG tablet    VITAMIN D PO     Current Facility-Administered Medications   Medication    1.0 mL bupivacaine (MARCAINE) 0.5% injection (50 mL vial)    lidocaine 1 % injection 1 mL    triamcinolone (KENALOG-40) injection 40 mg             Review of Systems:     A 10 point ROS was performed and reviewed. Specific responses to these questions are noted at the end of the document.         Physical Exam:   Vitals: Ht 1.76 m (5' 9.29\")   Wt 92.1 kg (203 lb)   BMI 29.73 kg/m    Constitutional: awake, alert, cooperative, no apparent distress, appears stated age.    Eyes: The sclera are white.  Ears, Nose, Throat: The trachea is midline.  Psychiatric: The patient has a normal affect.  Respiratory: breathing non-labored  Cardiovascular: The extremities are warm and perfused.  Skin: no obvious rashes or lesions.  Musculoskeletal, Neurologic, and Spine:     Cervical spine:    Appearance -no gross step-offs, kyphosis.    Motor -     C5: Deltoids R 5/5 and L 5/5 strength    C6: Biceps R 5/5 and L 5/5 strength     C7: Triceps R 5/5 and L 5/5 strength     C8:  R 3/5 and L 5/5 strength     T1: Dorsal interossei R 3/5 and L 5/5 strength        Sensation: intact to light touch in C5-T1      Special Tests -      Lhermitte's Test - Negative     Spurling's Test - Negative      Sibley's Test - Negative               Imaging:   We ordered and independently reviewed new radiographs at this clinic visit. The results were discussed with the patient.  Findings include:    Radiographs obtained in today's clinic were compared to cervical radiographs taken on 01/25 as well as cervical MRI spine which shows moderate degenerative arthritic changes of the cervical spine without significant cord " narrowing concerning for myelopathy.  Mild neuroforaminal stenosis noted but not worse on right compared to left and lower cervical distribution.             Assessment and Plan:   Assessment:  68 year old male with neck pain and right hand weakness, numbness and dexterity issues.    We had a extensive talk with the patient today regarding his symptoms of neck discomfort and corresponding right hand/arm weakness, numbness, and decreased dexterity.  We reviewed the MRI findings and radiographic findings showing moderate cervical spine degenerative arthritic changes which is likely contributing to his neck pain.  Visually we also reviewed previous MRI which showed no significant cord narrowing concerning for myelopathy.  At this time is unlikely that his right arm symptoms are result of cervical spine pathology.  Given his intrinsic hand weakness as well as weakness in forming a fist, there is concern for possible cubital tunnel syndrome.  Her to further investigate this we recommend EMG study to discern exactly etiology of symptoms to the right hand.  Patient will be recommend that the patient pursue physical therapy as a supplement to cervical ejections as well as medications with Dr. Chaney.    We will plan to review the findings of the EMG study with the patient either in person or over the phone when completed.  All questions were answered the patient left out any significant concerns.     Plan:  EMG R arm for possible cubital tunnel syndrome  Cervical Spine PT referral placed  RTC following completion of EMG for review.     Denis Vogel MD  Orthopedic Surgery PGY1  [phone]    Attending MD (Dr. Doc Castillo) :  I met with the patient, reviewed and verified the history and physical exam of the patient and discussed the patient's management with the other clinical providers involved in this patient's care including any involved residents or physicians assistants. I reviewed the above note and agree  with the documented findings and plan of care, which were communicated to the patient.      Doc Castillo MD      Respectfully,  Doc Castillo MD  Spine Surgery  [facility]

## 2024-02-27 NOTE — PROGRESS NOTES
Spine Surgery Consultation    REFERRING PHYSICIAN: No ref. provider found   PRIMARY CARE PHYSICIAN: Chiara Waterman           Chief Complaint:   Consult (New patient consult for cervical spine, referred by Dr. Chaney.)      History of Present Illness:  Symptom Profile Including: location of symptoms, onset, severity, exacerbating/alleviating factors, previous treatments:        Rasheed Zimmerman is a 68 year old male who presents with acute on chronic worsening of lower cervical neck pain and right hand weakness, dexterity, and intermittent nighttime numbness.  Patient states that the symptoms began primarily in the summer of last year.  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  Additionally he complained of neck pain without radicular symptoms.  Patient had been seeing Dr. Chaney with pain management who started the patient on anti-inflammatory medication and muscle relaxants as well as recently in January performed corticosteroid injection patient had no significant relief in symptoms from that injection.          Past Medical History:     Past Medical History:   Diagnosis Date    Allergic rhinitis due to pollen 06/02/2023    Asthma     BPH with obstruction/lower urinary tract symptoms 06/02/2023    Cervical spondylosis without myelopathy 06/02/2023    Chondromalacia of patella     H/O adenomatous polyp of colon 5/13/2021    H/O nasal polyp     Hyperlipidemia     Hypertension     Microscopic hematuria 11/14/2013    Other and unspecified hyperlipidemia     Other chronic sinusitis     Polyarthropathy             Past Surgical History:     Past Surgical History:   Procedure Laterality Date    COLONOSCOPY N/A 05/13/2021    Procedure: Colonoscopy, With Polypectomy And Biopsy;  Surgeon: Laurie Garcia DO;  Location: [facility] OR    COLONOSCOPY WITH CO2 INSUFFLATION N/A 05/13/2021    Procedure: COLONOSCOPY,  WITH CO2 INSUFFLATION;  Surgeon: Laurie Garcia DO;  Location: [facility] OR    ENT SURGERY      FASCIOTOMY LOWER EXTREMITY      right leg    PHACOEMULSIFICATION CLEAR CORNEA WITH STANDARD INTRAOCULAR LENS IMPLANT  2012    Procedure: PHACOEMULSIFICATION CLEAR CORNEA WITH STANDARD INTRAOCULAR LENS IMPLANT;  LEFT PHACOEMULSIFICATION CLEAR CORNEA WITH STANDARD INTRAOCULAR LENS IMPLANT ;  Surgeon: Miko Rodríguez MD;  Location:  [facility]    PHACOEMULSIFICATION CLEAR CORNEA WITH STANDARD INTRAOCULAR LENS IMPLANT  11/15/2012    Procedure: PHACOEMULSIFICATION CLEAR CORNEA WITH STANDARD INTRAOCULAR LENS IMPLANT;  RIGHT PHACOEMULSIFICATION CLEAR CORNEA WITH STANDARD INTRAOCULAR LENS IMPLANT ;  Surgeon: Miko Rodríguez MD;  Location:  [facility]    total knee              Social History:     Social History     Tobacco Use    Smoking status: Never     Passive exposure: Never    Smokeless tobacco: Never   Substance Use Topics    Alcohol use: No            Family History:     Family History   Problem Relation Age of Onset    Asthma Father     Alzheimer Disease Paternal Grandmother         alive at 90    Asthma Paternal Grandmother     C.A.D. Paternal Grandfather          86 YOA, [place]    Diabetes Paternal Grandfather         Type 2    Neurologic Disorder Paternal Grandfather         zenobia gehrig's disease,  at 64 YOA    Coronary Artery Disease Paternal Grandfather     Asthma Son             Allergies:     Allergies   Allergen Reactions    Dust Mites             Medications:     Current Outpatient Medications   Medication    albuterol (VENTOLIN HFA) 108 (90 Base) MCG/ACT inhaler    alfuzosin ER (UROXATRAL) 10 MG 24 hr tablet    celecoxib (CELEBREX) 200 MG capsule    fluticasone (FLONASE) 50 MCG/ACT nasal spray    fluticasone-vilanterol (BREO ELLIPTA) 200-25 MCG/ACT inhaler    methocarbamol (ROBAXIN) 500 MG tablet    methylPREDNISolone (MEDROL) 4 MG tablet therapy pack    simvastatin (ZOCOR) 40 MG tablet    VITAMIN  "D PO     Current Facility-Administered Medications   Medication    1.0 mL bupivacaine (MARCAINE) 0.5% injection (50 mL vial)    lidocaine 1 % injection 1 mL    triamcinolone (KENALOG-40) injection 40 mg             Review of Systems:     A 10 point ROS was performed and reviewed. Specific responses to these questions are noted at the end of the document.         Physical Exam:   Vitals: Ht 1.76 m (5' 9.29\")   Wt 92.1 kg (203 lb)   BMI 29.73 kg/m    Constitutional: awake, alert, cooperative, no apparent distress, appears stated age.    Eyes: The sclera are white.  Ears, Nose, Throat: The trachea is midline.  Psychiatric: The patient has a normal affect.  Respiratory: breathing non-labored  Cardiovascular: The extremities are warm and perfused.  Skin: no obvious rashes or lesions.  Musculoskeletal, Neurologic, and Spine:     Cervical spine:    Appearance -no gross step-offs, kyphosis.    Motor -     C5: Deltoids R 5/5 and L 5/5 strength    C6: Biceps R 5/5 and L 5/5 strength     C7: Triceps R 5/5 and L 5/5 strength     C8:  R 3/5 and L 5/5 strength     T1: Dorsal interossei R 3/5 and L 5/5 strength        Sensation: intact to light touch in C5-T1      Special Tests -      Lhermitte's Test - Negative     Spurling's Test - Negative      Sibley's Test - Negative               Imaging:   We ordered and independently reviewed new radiographs at this clinic visit. The results were discussed with the patient.  Findings include:    Radiographs obtained in today's clinic were compared to cervical radiographs taken on 01/25 as well as cervical MRI spine which shows moderate degenerative arthritic changes of the cervical spine without significant cord narrowing concerning for myelopathy.  Mild neuroforaminal stenosis noted but not worse on right compared to left and lower cervical distribution.             Assessment and Plan:   Assessment:  68 year old male with neck pain and right hand weakness, numbness and dexterity " issues.    We had a extensive talk with the patient today regarding his symptoms of neck discomfort and corresponding right hand/arm weakness, numbness, and decreased dexterity.  We reviewed the MRI findings and radiographic findings showing moderate cervical spine degenerative arthritic changes which is likely contributing to his neck pain.  Visually we also reviewed previous MRI which showed no significant cord narrowing concerning for myelopathy.  At this time is unlikely that his right arm symptoms are result of cervical spine pathology.  Given his intrinsic hand weakness as well as weakness in forming a fist, there is concern for possible cubital tunnel syndrome.  Her to further investigate this we recommend EMG study to discern exactly etiology of symptoms to the right hand.  Patient will be recommend that the patient pursue physical therapy as a supplement to cervical ejections as well as medications with Dr. Chaney.    We will plan to review the findings of the EMG study with the patient either in person or over the phone when completed.  All questions were answered the patient left out any significant concerns.     Plan:  EMG R arm for possible cubital tunnel syndrome  Cervical Spine PT referral placed  RTC following completion of EMG for review.     Denis Vogel MD  Orthopedic Surgery PGY1  [phone]    Attending MD (Dr. Doc Castillo) :  I met with the patient, reviewed and verified the history and physical exam of the patient and discussed the patient's management with the other clinical providers involved in this patient's care including any involved residents or physicians assistants. I reviewed the above note and agree with the documented findings and plan of care, which were communicated to the patient.      Doc Castillo MD      Respectfully,  Doc Castillo MD  Spine Surgery  [facility]

## 2024-02-27 NOTE — NURSING NOTE
"Reason For Visit:   Chief Complaint   Patient presents with    Consult     New patient consult for cervical spine, referred by Dr. Chaney.       Primary MD: Chiara Waterman  Ref. MD: Ochoa Chaney MD    ?  No  Occupation  - .  Currently working? Yes.  Work status?  Full time./part time sometimes  Date of injury: NA  Type of injury: Chronic.  Date of surgery: NA  Type of surgery: NA.  Smoker: No  Request smoking cessation information: No    Ht 1.76 m (5' 9.29\")   Wt 92.1 kg (203 lb)   BMI 29.73 kg/m      Pain Assessment  Patient Currently in Pain: Yes  0-10 Pain Scale: 8  Primary Pain Location: Neck    Oswestry (JOYCE) Questionnaire        2/27/2024     9:19 AM   OSWESTRY DISABILITY INDEX   Count 10   Sum 9   Oswestry Score (%) 18 %            Neck Disability Index (NDI) Questionnaire        2/27/2024     9:20 AM   Neck Disability Index (NDI)   Neck Disability Index: Count 10   NDI: Total Score = SUM (points for all 10 findings) 11   Neck Disability in Percent = (Total Score) / 50 * 100 22 (%)              Visual Analog Pain Scale  Back Pain Scale 0-10: 0  Right leg pain: 0  Left leg pain: 0  Neck Pain Scale 0-10: 7.5  Right arm pain: 0  Left arm pain: 0    Promis 10 Assessment        2/27/2024     9:19 AM   PROMIS 10   In general, would you say your health is: Good   In general, would you say your quality of life is: Excellent   In general, how would you rate your physical health? Good   In general, how would you rate your mental health, including your mood and your ability to think? Very good   In general, how would you rate your satisfaction with your social activities and relationships? Good   In general, please rate how well you carry out your usual social activities and roles Good   To what extent are you able to carry out your everyday physical activities such as walking, climbing stairs, carrying groceries, or moving a chair? Moderately   In the past 7 days, how often have you " been bothered by emotional problems such as feeling anxious, depressed, or irritable? Never   In the past 7 days, how would you rate your fatigue on average? Moderate   In the past 7 days, how would you rate your pain on average, where 0 means no pain, and 10 means worst imaginable pain? 6   In general, would you say your health is: 3   In general, would you say your quality of life is: 5   In general, how would you rate your physical health? 3   In general, how would you rate your mental health, including your mood and your ability to think? 4   In general, how would you rate your satisfaction with your social activities and relationships? 3   In general, please rate how well you carry out your usual social activities and roles. (This includes activities at home, at work and in your community, and responsibilities as a parent, child, spouse, employee, friend, etc.) 3   To what extent are you able to carry out your everyday physical activities such as walking, climbing stairs, carrying groceries, or moving a chair? 3   In the past 7 days, how often have you been bothered by emotional problems such as feeling anxious, depressed, or irritable? 1   In the past 7 days, how would you rate your fatigue on average? 3   In the past 7 days, how would you rate your pain on average, where 0 means no pain, and 10 means worst imaginable pain? 6   Global Mental Health Score 17   Global Physical Health Score 12   PROMIS TOTAL - SUBSCORES 29                Prashanth Love ATC

## 2024-03-04 ENCOUNTER — THERAPY VISIT (OUTPATIENT)
Dept: PHYSICAL THERAPY | Facility: CLINIC | Age: 69
End: 2024-03-04
Attending: ORTHOPAEDIC SURGERY
Payer: COMMERCIAL

## 2024-03-04 DIAGNOSIS — M54.2 CERVICALGIA: ICD-10-CM

## 2024-03-04 PROCEDURE — 97110 THERAPEUTIC EXERCISES: CPT | Mod: GP

## 2024-03-04 PROCEDURE — 97161 PT EVAL LOW COMPLEX 20 MIN: CPT | Mod: GP

## 2024-03-04 NOTE — PROGRESS NOTES
"PHYSICAL THERAPY EVALUATION  Type of Visit: Evaluation    See electronic medical record for Abuse and Falls Screening details.    Subjective       Presenting condition or subjective complaint: Pain in Lower Neck / Upper Back  Date of onset: 02/27/24    Relevant medical history: Arthritis; Asthma; Bladder or bowel problems   Dates & types of surgery:      Prior diagnostic imaging/testing results: MRI; X-ray     Prior therapy history for the same diagnosis, illness or injury: Yes long time ago    Prior Level of Function  Transfers:   Ambulation:   ADL:   IADL:     Living Environment  Social support:     Type of home:     Stairs to enter the home:         Ramp:     Stairs inside the home:         Help at home:    Equipment owned:       Employment:      Hobbies/Interests:      Patient goals for therapy: Less Pain at Work After Lunch    Subjective Summary: He reports that his hand isn't too good.  At first he felt that his hand weakness was caused by the neck.  He met with Dr. Castillo, who felt that his neck may be a separate issue. Dr. Castillo plans on ordering an EMG for his right hand to determine nerve involvement and ordered PT for the neck.   He reports 20 years ago he went to a arthritis place who gave him celebrex.  He reports being stable with his celebrex.  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 He reports his pain sucks at the end of the day and feels like someone hit him with a hammer.  He reports being bent over working on things makes it worse but looking upward at the screen feels better. He reports that Dr. Castillo says his shoulders are supper    Pain assessment: Location: Upper Thoracic Spine below his \"Big bone\" T2 level /Rating: Mild pain currently     Objective   CERVICAL SPINE EVALUATION  PAIN: Pain Level at Rest: 2/10  Pain Level with Use: 8/10  Pain is Exacerbated By: Working bent forward for prolonged periods  Pain is Relieved By: Leaning back in the car and " supporting it in a chair.  It becomes minimal at night when he can rest  Pain Progression: Worsened  INTEGUMENTARY (edema, incisions):   POSTURE:  Significant Cervical forward head posture and thoracic kyphosis  GAIT:   Weightbearing Status:   Assistive Device(s):   Gait Deviations:   BALANCE/PROPRIOCEPTION:   WEIGHTBEARING ALIGNMENT:   ROM:  CERVICAL - Flexion: WNL,  Extension: Severe Limited, Left Side Bend: Severe Limitation, Right Side Bend: Severe Limitation: Right Rotation: 39 degrees, Left Rotation: 43.    Shoulder ROM: Flexion: Right Shoulder 145, Left Shoulder: 145, Abduction: Right Shoulder 163, Left Shoulder: 165, ER: Right Shoulder 62, Left Shoulder: 65    MYOTOMES:  Right UE: Deltoid C5 5/5, Wrist Extension: C6 2/5, Wrist Flexion: C7 5/5, Finger Abduction: C8 5/5   Left UE: Deltoid C5 5/5, Wrist Extension: C6 3/5 with notable weakness on the unaffected side, Wrist Flexion: C7 5/5, Finger Abduction: C8 5/5   DTR S:   CORD SIGNS:   DERMATOMES:   NEURAL TENSION:  Median N Tension Bilaterally Positive.  Ulnar N Tension: Bilaterally Negative.  Radial N Tension: Bilaterally Negative   FLEXIBILITY:  Tight Pectorals Bilaterally    SPECIAL TESTS:   PALPATION:   SPINAL SEGMENTAL CONCLUSIONS:       Assessment & Plan   CLINICAL IMPRESSIONS  Medical Diagnosis: Cervicalgia    Treatment Diagnosis: Neck Pain and Bilateral Hand Numbness   Impression/Assessment: Patient is a 68 year old male with Neck Pain and Thoracic Pain complaints.  The following significant findings have been identified: Pain, Decreased ROM/flexibility, and Decreased strength. These impairments interfere with their ability to perform self care tasks, work tasks, recreational activities, household chores, driving , household mobility, and community mobility as compared to previous level of function.     Clinical Decision Making (Complexity):  Clinical Presentation: Stable/Uncomplicated  Clinical Presentation Rationale: based on medical and personal  factors listed in PT evaluation  Clinical Decision Making (Complexity): Low complexity    PLAN OF CARE  Treatment Interventions:  Interventions: Manual Therapy, Neuromuscular Re-education, Therapeutic Activity, Therapeutic Exercise    Long Term Goals     PT Goal 1  Goal Identifier: Work  Goal Description: Patient will be able to work for 3 hours without neck pain worse than 4/10  Rationale: to maximize safety and independence with performance of ADLs and functional tasks;to maximize safety and independence within the home;to maximize safety and independence within the community;to maximize safety and independence with transportation;to maximize safety and independence with self cares  Goal Progress: Patient currently gets 8-9 / 10 pain after working 5 hours  Target Date: 05/27/24      Frequency of Treatment: 1x a week  Duration of Treatment: 12 weeks    Recommended Referrals to Other Professionals:   Education Assessment:        Risks and benefits of evaluation/treatment have been explained.   Patient/Family/caregiver agrees with Plan of Care.     Evaluation Time:     PT Eval, Low Complexity Minutes (60641): 30       Signing Clinician: Laz Salazar PT

## 2024-03-05 ASSESSMENT — KOOS JR
HOW SEVERE IS YOUR KNEE STIFFNESS AFTER FIRST WAKING IN MORNING: MODERATE
STANDING UPRIGHT: MODERATE
GOING UP OR DOWN STAIRS: MODERATE
TWISING OR PIVOTING ON KNEE: SEVERE
KOOS JR SCORING: 59.38
STRAIGHTENING KNEE FULLY: MODERATE

## 2024-03-07 ENCOUNTER — ANCILLARY PROCEDURE (OUTPATIENT)
Dept: GENERAL RADIOLOGY | Facility: CLINIC | Age: 69
End: 2024-03-07
Attending: FAMILY MEDICINE
Payer: COMMERCIAL

## 2024-03-07 ENCOUNTER — OFFICE VISIT (OUTPATIENT)
Dept: ORTHOPEDICS | Facility: CLINIC | Age: 69
End: 2024-03-07
Payer: COMMERCIAL

## 2024-03-07 DIAGNOSIS — M25.561 ACUTE PAIN OF RIGHT KNEE: Primary | ICD-10-CM

## 2024-03-07 DIAGNOSIS — M25.561 ACUTE PAIN OF RIGHT KNEE: ICD-10-CM

## 2024-03-07 PROCEDURE — 99214 OFFICE O/P EST MOD 30 MIN: CPT | Performed by: FAMILY MEDICINE

## 2024-03-07 PROCEDURE — 73564 X-RAY EXAM KNEE 4 OR MORE: CPT | Mod: RT | Performed by: RADIOLOGY

## 2024-03-07 RX ORDER — DICLOFENAC SODIUM 75 MG/1
75 TABLET, DELAYED RELEASE ORAL DAILY
Qty: 14 TABLET | Refills: 0 | Status: SHIPPED | OUTPATIENT
Start: 2024-03-07

## 2024-03-07 RX ORDER — DICLOFENAC SODIUM 75 MG/1
75 TABLET, DELAYED RELEASE ORAL DAILY
Qty: 14 TABLET | Refills: 0 | Status: SHIPPED | OUTPATIENT
Start: 2024-03-07 | End: 2024-03-07

## 2024-03-07 ASSESSMENT — PAIN SCALES - GENERAL: PAINLEVEL: MILD PAIN (2)

## 2024-03-07 NOTE — PROGRESS NOTES
CHIEF COMPLAINT:  Pain and New Patient of the Right Knee (Tore calf in Nov 2023. Right knee pain since)       HISTORY OF PRESENT ILLNESS  Mr. Zimmerman is a pleasant 68 year old male who presents to clinic today with right knee pain.  Franky has been experiencing right knee pain that has been worsening since November last year.  He suffered a calf strain in November, he was seen by Dr. Sevilla and at that time.  He was treated conservatively and did improve, although unfortunately his knee has bothered him since then.  He points to the posterior aspect of his knee, at the lateral side.  This is worse when walking for long distances and pivoting.        Additional history: as documented    MEDICAL HISTORY  Patient Active Problem List   Diagnosis    Sinusitis, chronic    Thoracic or lumbosacral neuritis or radiculitis, unspecified    Moderate persistent asthma in adult without complication    Actinic keratosis    Hyperlipidemia LDL goal <130    Advanced directives, counseling/discussion    Microscopic hematuria    Calculus of kidney    Cervicalgia    Mild intermittent asthma without complication    Cervical spondylosis without myelopathy    BPH with obstruction/lower urinary tract symptoms    Allergic rhinitis due to pollen    H/O adenomatous polyp of colon    Hip pain, left       Current Outpatient Medications   Medication Sig Dispense Refill    albuterol (VENTOLIN HFA) 108 (90 Base) MCG/ACT inhaler INHALE 2 PUFFS INTO THE LUNGS EVERY 6 HOURS 18 g 1    alfuzosin ER (UROXATRAL) 10 MG 24 hr tablet Take 1 tablet (10 mg) by mouth daily 30 tablet 11    celecoxib (CELEBREX) 200 MG capsule TAKE 1 CAPSULE(200 MG) BY MOUTH EVERY MORNING 90 capsule 1    fluticasone (FLONASE) 50 MCG/ACT nasal spray Spray 1 spray into both nostrils daily      fluticasone-vilanterol (BREO ELLIPTA) 200-25 MCG/ACT inhaler Inhale 1 puff into the lungs daily 1 each 11    methocarbamol (ROBAXIN) 500 MG tablet Take 1 tablet (500 mg) by mouth 3 times daily  as needed for muscle spasms 30 tablet 0    simvastatin (ZOCOR) 40 MG tablet TAKE 1 TABLET(40 MG) BY MOUTH AT BEDTIME 90 tablet 1    VITAMIN D PO Take 2,000 Int'l Units/day by mouth      methylPREDNISolone (MEDROL) 4 MG tablet therapy pack Follow Package Directions 21 tablet 0       Allergies   Allergen Reactions    Dust Mites        Family History   Problem Relation Age of Onset    Asthma Father     Alzheimer Disease Paternal Grandmother         alive at 90    Asthma Paternal Grandmother     C.A.D. Paternal Grandfather          86 YOA, [place]    Diabetes Paternal Grandfather         Type 2    Neurologic Disorder Paternal Grandfather         zenobia gehrig's disease,  at 64 YOA    Coronary Artery Disease Paternal Grandfather     Asthma Son        Additional medical/Social/Surgical histories reviewed in [facility] and updated as appropriate.        PHYSICAL EXAM  General  - normal appearance, in no obvious distress  Musculoskeletal - right knee  - stance: mildly antalgic gait  - inspection: no swelling  - palpation: lateral soft tissue tenderness inferior to the joint line, at posterior aspect  - ROM: 120 degrees flexion, 0 degrees extension, painful active ROM  - special tests:  (-) Heena  (-) varus at 0 and 30 degrees flexion  (-) valgus at 0 and 30 degrees flexion  Neuro  - no sensory or motor deficit, grossly normal coordination, normal muscle tone              ASSESSMENT & PLAN  Mr. Zimmerman is a 68 year old male who presents to clinic today with right knee pain after a calf strain, status post total knee arthroplasty.    I ordered and independently reviewed an xray of his right knee, this reveals an intact arthroplasty.    His symptoms do seem soft tissue in nature, likely at the insertion of the lateral hamstring musculature at the proximal fibula.  We did discuss that conservative treatment with physical therapy, analgesics, and a brace very well may help.  He is going to attempt this over the course of  the next few weeks.  I am also prescribing him diclofenac to take for 2 weeks on a regular basis.    If he is not experiencing any improvement whatsoever I would likely recommend dedicated imaging.    It was a pleasure seeing Franky today.    Ochoa Sal DO, [facility]  Primary Care Sports Medicine      This note was constructed using Dragon dictation software, please excuse any minor errors in spelling, grammar, or syntax.

## 2024-03-07 NOTE — NURSING NOTE
Chief Complaint   Patient presents with    Right Knee - Pain, New Patient     Tore calf in Nov 2023. Right knee pain since       There were no vitals filed for this visit.    There is no height or weight on file to calculate BMI.      ASHLEY John NREMT

## 2024-03-07 NOTE — LETTER
3/7/2024         RE: Rasheed Zimmerman  [address] [facility] [address]        Dear Colleague,    Thank you for referring your patient, Rasheed Zimmerman, to the [facility] [place]. Please see a copy of my visit note below.    CHIEF COMPLAINT:  Pain and New Patient of the Right Knee (Tore calf in Nov 2023. Right knee pain since)       HISTORY OF PRESENT ILLNESS  Mr. Zimmerman is a pleasant 68 year old male who presents to clinic today with right knee pain.  Franky has been experiencing right knee pain that has been worsening since November last year.  He suffered a calf strain in November, he was seen by Dr. Sevilla and at that time.  He was treated conservatively and did improve, although unfortunately his knee has bothered him since then.  He points to the posterior aspect of his knee, at the lateral side.  This is worse when walking for long distances and pivoting.        Additional history: as documented    MEDICAL HISTORY  Patient Active Problem List   Diagnosis     Sinusitis, chronic     Thoracic or lumbosacral neuritis or radiculitis, unspecified     Moderate persistent asthma in adult without complication     Actinic keratosis     Hyperlipidemia LDL goal <130     Advanced directives, counseling/discussion     Microscopic hematuria     Calculus of kidney     Cervicalgia     Mild intermittent asthma without complication     Cervical spondylosis without myelopathy     BPH with obstruction/lower urinary tract symptoms     Allergic rhinitis due to pollen     H/O adenomatous polyp of colon     Hip pain, left       Current Outpatient Medications   Medication Sig Dispense Refill     albuterol (VENTOLIN HFA) 108 (90 Base) MCG/ACT inhaler INHALE 2 PUFFS INTO THE LUNGS EVERY 6 HOURS 18 g 1     alfuzosin ER (UROXATRAL) 10 MG 24 hr tablet Take 1 tablet (10 mg) by mouth daily 30 tablet 11     celecoxib (CELEBREX) 200 MG capsule TAKE 1 CAPSULE(200 MG) BY MOUTH EVERY MORNING 90 capsule  1     fluticasone (FLONASE) 50 MCG/ACT nasal spray Spray 1 spray into both nostrils daily       fluticasone-vilanterol (BREO ELLIPTA) 200-25 MCG/ACT inhaler Inhale 1 puff into the lungs daily 1 each 11     methocarbamol (ROBAXIN) 500 MG tablet Take 1 tablet (500 mg) by mouth 3 times daily as needed for muscle spasms 30 tablet 0     simvastatin (ZOCOR) 40 MG tablet TAKE 1 TABLET(40 MG) BY MOUTH AT BEDTIME 90 tablet 1     VITAMIN D PO Take 2,000 Int'l Units/day by mouth       methylPREDNISolone (MEDROL) 4 MG tablet therapy pack Follow Package Directions 21 tablet 0       Allergies   Allergen Reactions     Dust Mites        Family History   Problem Relation Age of Onset     Asthma Father      Alzheimer Disease Paternal Grandmother         alive at 90     Asthma Paternal Grandmother      C.A.D. Paternal Grandfather          86 YOA, [place]     Diabetes Paternal Grandfather         Type 2     Neurologic Disorder Paternal Grandfather         zenobia gehrig's disease,  at 64 YOA     Coronary Artery Disease Paternal Grandfather      Asthma Son        Additional medical/Social/Surgical histories reviewed in [facility] and updated as appropriate.        PHYSICAL EXAM  General  - normal appearance, in no obvious distress  Musculoskeletal - right knee  - stance: mildly antalgic gait  - inspection: no swelling  - palpation: lateral soft tissue tenderness inferior to the joint line, at posterior aspect  - ROM: 120 degrees flexion, 0 degrees extension, painful active ROM  - special tests:  (-) Heena  (-) varus at 0 and 30 degrees flexion  (-) valgus at 0 and 30 degrees flexion  Neuro  - no sensory or motor deficit, grossly normal coordination, normal muscle tone              ASSESSMENT & PLAN  Mr. Zimmerman is a 68 year old male who presents to clinic today with right knee pain after a calf strain, status post total knee arthroplasty.    I ordered and independently reviewed an xray of his right knee, this reveals an intact  arthroplasty.    His symptoms do seem soft tissue in nature, likely at the insertion of the lateral hamstring musculature at the proximal fibula.  We did discuss that conservative treatment with physical therapy, analgesics, and a brace very well may help.  He is going to attempt this over the course of the next few weeks.  I am also prescribing him diclofenac to take for 2 weeks on a regular basis.    If he is not experiencing any improvement whatsoever I would likely recommend dedicated imaging.    It was a pleasure seeing Franky today.    Ochoa Sal DO, [facility]  Primary Care Sports Medicine      This note was constructed using Dragon dictation software, please excuse any minor errors in spelling, grammar, or syntax.      Again, thank you for allowing me to participate in the care of your patient.        Sincerely,        Ochoa Sal DO

## 2024-03-12 ENCOUNTER — THERAPY VISIT (OUTPATIENT)
Dept: PHYSICAL THERAPY | Facility: CLINIC | Age: 69
End: 2024-03-12
Attending: ORTHOPAEDIC SURGERY
Payer: COMMERCIAL

## 2024-03-12 DIAGNOSIS — M54.2 CERVICALGIA: Primary | ICD-10-CM

## 2024-03-12 PROCEDURE — 97140 MANUAL THERAPY 1/> REGIONS: CPT | Mod: GP

## 2024-03-12 PROCEDURE — 97110 THERAPEUTIC EXERCISES: CPT | Mod: GP

## 2024-03-13 ASSESSMENT — ACTIVITIES OF DAILY LIVING (ADL)
SITTING_FOR_1_HOUR: MODERATE DIFFICULTY
GETTING_INTO_OR_OUT_OF_A_CAR: NO DIFFICULTY
DRIVING: NO DIFFICULTY
SLEEPING: NO DIFFICULTY
PLACING_AN_OBJECT_ONTO,_OR_REMOVING_IT_FROM_AN_OVERHEAD_SHELF: QUITE A BIT OF DIFFICULTY
PERFORMING_HEAVY_ACTIVITIES_AROUND_YOUR_HOME: QUITE A BIT OF DIFFICULTY
OPENING_A_JAR: A LITTLE BIT OF DIFFICULTY
PUTTING_ON_YOUR_SHOES_OR_SOCKS: A LITTLE BIT OF DIFFICULTY
SHOPPING: EXTREME DIFFICULTY OR UNABLE TO PERFORM ACTIVITY
LEFS_RAW_SCORE: 0
WALKING_BETWEEN_ROOMS: NO DIFFICULTY
PLEASE_INDICATE_YOR_PRIMARY_REASON_FOR_REFERRAL_TO_THERAPY:: ELBOW
UEFI_TOTAL_SCORE/80: .51
TYING_OR_LACING_SHOES: EXTREME DIFFICULTY
PUSHING_UP_ON_YOUR_HANDS: A LITTLE BIT OF DIFFICULTY
LIFTING_A_BAG_OF_GROCERIES_TO_WAIST_LEVEL: QUITE A BIT OF DIFFICULTY
LAUNDERING_CLOTHES: NO DIFFICULTY
WALKING_A_MILE: EXTREME DIFFICULTY OR UNABLE TO PERFORM ACTIVITY
MAKING_SHARP_TURNS_WHILE_RUNNING_FAST: EXTREME DIFFICULTY OR UNABLE TO PERFORM ACTIVITY
OPENING_DOORS: NO DIFFICULTY
GOING_UP_OR_DOWN_10_STAIRS: MODERATE DIFFICULTY
USING_TOOLS_OR_APPLIANCES: EXTREME DIFFICULTY
THROWING_A_BALL: MODERATE DIFFICULTY
RUNNING_ON_EVEN_GROUND: EXTREME DIFFICULTY OR UNABLE TO PERFORM ACTIVITY
LEFS_SCORE(%): 0
CLEANING: NO DIFFICULTY
PREPARING_FOOD: EXTREME DIFFICULTY
SQUATTING: NO DIFFICULTY
ANY_OF_YOUR_USUAL_WORK,_HOUSEWORK_OR_SCHOOL_ACTIVITIES: QUITE A BIT OF DIFFICULTY
ANY_OF_YOUR_USUAL_WORK,_HOUSEWORK_OR_SCHOOL_ACTIVITIES: EXTREME DIFFICULTY
UEFI_TOTAL_SCORE: 41
CARRYING_A_SMALL_SUITCASE_WITH_YOUR_AFFECTED_LIMB: NO DIFFICULTY
LIFTING_AN_OBJECT,_LIKE_A_BAG_OF_GROCERIES_FROM_THE_FLOOR: NO DIFFICULTY
GETTING_INTO_AND_OUT_OF_A_BATH: NO DIFFICULTY
STANDING_FOR_1_HOUR: MODERATE DIFFICULTY
YOUR_USUAL_HOBBIES,_RECREATIONAL_OR_SPORTING_ACTIVITIES: EXTREME DIFFICULTY
YOUR_USUAL_HOBBIES,_RECREATIONAL_OR_SPORTING_ACTIVITIES: QUITE A BIT OF DIFFICULTY
PERFORMING_LIGHT_ACTIVITIES_AROUND_YOUR_HOME: A LITTLE BIT OF DIFFICULTY
VACUUMING,_SWEEPING,_OR_RAKING: MODERATE DIFFICULTY
ROLLING_OVER_IN_BED: NO DIFFICULTY
DOING_UP_BUTTONS: EXTREME DIFFICULTY
PLEASE_INDICATE_YOR_PRIMARY_REASON_FOR_REFERRAL_TO_THERAPY:: FOOT AND/OR ANKLE
WALKING_2_BLOCKS: QUITE A BIT OF DIFFICULTY
WASHING_YOUR_HAIR_OR_SCALP: NO DIFFICULTY
DRESS: QUITE A BIT OF DIFFICULTY
RUNNING_ON_UNEVEN_GROUND: EXTREME DIFFICULTY OR UNABLE TO PERFORM ACTIVITY

## 2024-03-14 ENCOUNTER — THERAPY VISIT (OUTPATIENT)
Dept: PHYSICAL THERAPY | Facility: CLINIC | Age: 69
End: 2024-03-14
Payer: COMMERCIAL

## 2024-03-14 DIAGNOSIS — M25.561 ACUTE PAIN OF RIGHT KNEE: ICD-10-CM

## 2024-03-14 PROCEDURE — 97162 PT EVAL MOD COMPLEX 30 MIN: CPT | Mod: GP

## 2024-03-14 PROCEDURE — 97110 THERAPEUTIC EXERCISES: CPT | Mod: GP

## 2024-03-14 PROCEDURE — 97035 APP MDLTY 1+ULTRASOUND EA 15: CPT | Mod: GP

## 2024-03-14 NOTE — PROGRESS NOTES
PHYSICAL THERAPY EVALUATION  Type of Visit: Evaluation    See electronic medical record for Abuse and Falls Screening details.    Subjective       Presenting condition or subjective complaint: Pain in Lower Neck / Upper Back  Date of onset: 23    Relevant medical history: Arthritis; Asthma   Dates & types of surgery: Knee replacement , fractured leg 34 years ago, three sinus surgeries early  I think    Prior diagnostic imaging/testing results: MRI; X-ray     Prior therapy history for the same diagnosis, illness or injury: No long time ago    Prior Level of Function  Transfers:   Ambulation:   ADL:   IADL:     Living Environment  Social support: With a significant other or spouse   Type of home: House; 1 level   Stairs to enter the home: Yes 2 Is there a railing: Yes   Ramp: No   Stairs inside the home: Yes 13 Is there a railing: Yes   Help at home: Self Cares (home health aide/personal care attendant, family, etc); Home management tasks (cooking, cleaning)  Equipment owned: Straight Cane; Walker with wheels; Crutches     Employment: Yes ,   Hobbies/Interests: Woodworking and gardening    Patient goals for therapy: Less Pain at Work After Lunch    Pain assessment: Pain present  Location: R lateral knee/Ratin/10     Objective   KNEE EVALUATION  PAIN: Pain Level at Rest: 0/10  Pain Level with Use: 4/10  Pain Location: knee  Pain Quality: Aching, Sharp, and Stabbing  Pain Frequency: intermittent  Pain is Worst: During activity  Pain is Exacerbated By: Rowdy surrounding musculature  Pain is Relieved By: rest and brace  Pain Progression: Unchanged  INTEGUMENTARY (edema, incisions):   POSTURE:   GAIT:  Weightbearing Status: WBAT  Assistive Device(s): None  Gait Deviations: Antalgic  Base of support increased  BALANCE/PROPRIOCEPTION:   WEIGHTBEARING ALIGNMENT:   NON-WEIGHTBEARING ALIGNMENT:   ROM:     STRENGTH:  5/5 in knee flexion/extension, unable to complete calf raise in  standing  FLEXIBILITY:   SPECIAL TESTS:  Varus (-), Valgus (-), Tiffanie's (-)  FUNCTIONAL TESTS:   PALPATION:  pain at gerdy's tubercle   JOINT MOBILITY:     Assessment & Plan   CLINICAL IMPRESSIONS  Medical Diagnosis: Acute pain of right knee    Treatment Diagnosis: lateral knee pain   Impression/Assessment: Patient is a 68 year old male with R lateral knee pain complaints.  The following significant findings have been identified: Pain, Decreased ROM/flexibility, Decreased strength, Impaired gait, and Impaired muscle performance. These impairments interfere with their ability to perform self care tasks, work tasks, recreational activities, household chores, driving , household mobility, and community mobility as compared to previous level of function.     Clinical Decision Making (Complexity):  Clinical Presentation: Evolving/Changing  Clinical Presentation Rationale: based on medical and personal factors listed in PT evaluation  Clinical Decision Making (Complexity): Moderate complexity    PLAN OF CARE  Treatment Interventions:  Modalities: E-stim, Ultrasound  Interventions: Manual Therapy, Neuromuscular Re-education, Therapeutic Activity, Therapeutic Exercise, Self-Care/Home Management    Long Term Goals     PT Goal 1  Goal Identifier: Pain  Goal Description: Patient's pain will decrease from 4/10 to 0/10 with all activities  Rationale: to maximize safety and independence with performance of ADLs and functional tasks;to maximize safety and independence within the home;to maximize safety and independence within the community;to maximize safety and independence with transportation;to maximize safety and independence with self cares  Goal Progress: Patient currently gets 4/10 pain after working all day  Target Date: 06/06/24  PT Goal 2  Goal Identifier: Strength  Goal Description: Patient will increase strength in gastroc to 5/5 in order to complete ADLs that require him to go up on his toes  Rationale: to maximize  safety and independence with performance of ADLs and functional tasks;to maximize safety and independence within the home;to maximize safety and independence within the community;to maximize safety and independence with self cares;to maximize safety and independence with transportation  Goal Progress: Patient is unable to perform calf raises in standing wihtout significant pain  Target Date: 06/06/24      Frequency of Treatment: 1x a week  Duration of Treatment: 12 weeks    Recommended Referrals to Other Professionals:   Education Assessment:        Risks and benefits of evaluation/treatment have been explained.   Patient/Family/caregiver agrees with Plan of Care.     Evaluation Time:     PT Eval, Moderate Complexity Minutes (76353): 20       Signing Clinician: Laz Salazar PT

## 2024-03-15 ENCOUNTER — OFFICE VISIT (OUTPATIENT)
Dept: PHYSICAL MEDICINE AND REHAB | Facility: CLINIC | Age: 69
End: 2024-03-15
Attending: ORTHOPAEDIC SURGERY
Payer: COMMERCIAL

## 2024-03-15 ENCOUNTER — TRANSCRIBE ORDERS (OUTPATIENT)
Dept: ORTHOPEDICS | Facility: CLINIC | Age: 69
End: 2024-03-15

## 2024-03-15 DIAGNOSIS — R29.898 ARM WEAKNESS: Primary | ICD-10-CM

## 2024-03-15 DIAGNOSIS — G56.21 CUBITAL TUNNEL SYNDROME ON RIGHT: Primary | ICD-10-CM

## 2024-03-15 DIAGNOSIS — G54.0 BRACHIAL PLEXUS NEUROPATHY: ICD-10-CM

## 2024-03-15 DIAGNOSIS — G56.22 CUBITAL TUNNEL SYNDROME, LEFT: ICD-10-CM

## 2024-03-15 DIAGNOSIS — M54.2 CERVICALGIA: ICD-10-CM

## 2024-03-15 DIAGNOSIS — G56.21 CUBITAL TUNNEL SYNDROME ON RIGHT: ICD-10-CM

## 2024-03-15 DIAGNOSIS — R29.898 RIGHT HAND WEAKNESS: ICD-10-CM

## 2024-03-15 PROCEDURE — 95912 NRV CNDJ TEST 11-12 STUDIES: CPT | Performed by: PHYSICAL MEDICINE & REHABILITATION

## 2024-03-15 PROCEDURE — 95886 MUSC TEST DONE W/N TEST COMP: CPT | Performed by: PHYSICAL MEDICINE & REHABILITATION

## 2024-03-15 NOTE — PROGRESS NOTES
[facility]  [address] [place] [address] [address]  [place] [address]  Office: [phone] Fax: [phone]    Electromyography and Nerve Conduction Study Report        Indication: Patient presents at the request Dr. Doc Castillo for a bilateral upper extremity EMG.  He has approximate 1 year history of progressive weakness and paresthesias in the bilateral upper extremities right greater than left.Most of the paresthesias is between digits 2 and 4.  Weakness of wrist extension  and intrinsic muscles of the hands.  He is right-handed.  On exam, he has decreased sensation to light touch right digit 5.  He has 1+ bicep, tricep brachioradialis reflexes bilaterally with negative Héctor's.  5 -/5 strength right shoulder abductor 5/5 on the left, 5/5 bilateral elbow flexors and extensors, 2/5 right wrist extensors and finger extensors, 4/5 on the left.  1/5 right interosseous 5/5 on the left.  Atrophy noted of the right intrinsic muscles of the hand.  Predominantly APB and FDI.  He has normal strength of the ankles and dorsiflexors and plantar flexors in the bilateral lower extremities and reports no symptoms in the lower extremities.        Pt Exam Discussion (Communication Barriers):  Electromyography and nerve conduction testing, including associated discomfort, risks, benefits, and alternatives was discussed with the patient prior to the procedure.  No learning/ communication barriers; patient verbalized understanding of procedure.  Informed consent was obtained.           Pt Assessment:  Testing was successfully completed; patient tolerated testing well.       Blood Thinners: None Skin Temperature: Warmed 31.5                     EMG/NCS  results:     Nerve Conduction Studies  Motor Sites      Segment Distal Latency Neg. Amp CV F-Latency F-Estimate Comment   Site  (ms) (mV) (m/s) (ms) (ms)    Left Median (APB) Motor   Wrist Wrist-APB *4.9 5.1       Elbow Elbow-Wrist 11.0 4.3 *40       Right Median (APB) Motor   Wrist Wrist-APB *8.7 0.52       Elbow Elbow-Wrist 15.5 *0.25 *37      Left Ulnar (ADM) Motor   Wrist  2.9 6.1       Bel Elbow Bel Elbow-Wrist 8.1 4.8 50      Ab Elbow Ab Elbow-Wrist 10.3 4.8 51      Right Ulnar (ADM) Motor   Wrist  3.4 6.9       Bel Elbow Bel Elbow-Wrist 8.3 6.0 50      Ab Elbow Ab Elbow-Wrist 10.5 5.6 51        Sensory Sites      Onset Lat Peak Lat Amp CV Comment   Site (ms) (ms) ( V) (m/s)    Left Median Anti Sensory   Wrist-Dig II 3.2 *3.9 *12 41    Right Median Anti Sensory   Wrist-Dig II 4.6 *5.3 *6 28    Left Median-Ulnar Palmar Sensory        Median   Palm-Wrist 1.78 2.3 47 45         Ulnar   Palm-Wrist 1.55 1.95 *11 52    Right Median-Ulnar Palmar Sensory        Median   Palm-Wrist *NR *NR *NR *NR         Ulnar   Palm-Wrist 1.80 2.2 *6 44    Left Radial Sensory   Forearm-Wrist 2.0 2.6 *12 50    Right Radial Sensory   Forearm-Wrist 2.1 2.7 *14 48    Left Ulnar Anti Sensory   Wrist-Dig V 2.5 *3.1 14 44    Right Ulnar Anti Sensory   Wrist-Dig V 2.9 *3.5 *10 38        NCS Waveforms:    Motor                Sensory                             Electromyography     Side Muscle Nerve Root Ins Act Fibs Psw Fasc Recrt Dur Amp Poly Comment   Right Deltoid Axillary C5-6 Nml Nml Nml Nml Nml Nml Nml 0    Right Triceps Radial C6-7-8 Nml Nml Nml Nml Nml Nml Nml 0    Right PronatorTeres Median C6-7 Nml Nml Nml Nml Nml Nml Nml 0    Right 1stDorInt Ulnar C8-T1 *Incr *1+ *1+ Nml      No activation   Right Abd Poll Brev Median C8-T1 *Incr *1+ *1+ Nml Nml *>12ms Nml *2+    Right Abd Dig Min2 Ulnar C8-T1 Nml Nml Nml Nml Nml Nml Nml 0    Right FlexPolLong2 Median (Ant Int) C7-8 Nml Nml Nml Nml Nml Nml Nml 0    Right Ext Indicis2 Radial (Post Int) C7-8 *Incr *1+ *1+ Nml *Reduced *>12ms *Incr 0    Right FlexCarpiUln2 Ulnar C8,T1 Nml Nml Nml Nml Nml Nml Nml 0    Left Deltoid Axillary C5-6 Nml Nml Nml Nml Nml Nml Nml 0    Left Triceps Radial C6-7-8 Nml Nml Nml Nml Nml Nml Nml 0    Left  PronatorTeres Median C6-7 Nml Nml Nml Nml Nml Nml Nml 0    Left 1stDorInt Ulnar C8-T1 *Incr *1+ *1+ Nml *Reduced *>12ms *Incr 0    Left Abd Poll Brev Median C8-T1 Nml Nml Nml Nml Nml Nml Nml 0    Left Abd Dig Min2 Ulnar C8-T1 Nml Nml Nml Nml Nml Nml Nml 0    Left FlexPolLong2 Median (Ant Int) C7-8 Nml Nml Nml Nml Nml Nml Nml 0    Left Ext Indicis2 Radial (Post Int) C7-8 *Incr *1+ *1+ Nml *Reduced Nml Nml 0    Left FlexCarpiUln2 Ulnar C8,T1 *Incr *1+ *1+ Nml Nml Nml Nml 0          Comment NCS: Abnormal study  1.  Prolonged latency right greater than left median SNAPs with decreased amplitudes.  2.  Prolonged latency right borderline left ulnar SNAPs with borderline amplitudes.  3.  Normal peak latencies of the bilateral radial SNAPs with mildly reduced amplitudes.  4.  Absent right median transcarpal study with borderline peak latency on the left with reduced amplitude on the left.  5.  Normal peak latencies with reduced amplitudes of the ulnar transcarpal studies.  6.  Significantly prolonged latency with severely reduced amplitude right median CMAP  7.  Mildly prolonged latency with normal amplitude and slowed conduction velocity of the left median CMAP.  8.  Diffuse borderline/low amplitude with normal latencies of the bilateral ulnar CMAP.  Borderline conduction velocities without amplitude drop or slowing across the elbow.      Comment EMG: Abnormal study  1.  Increased insertional activity with positive waves and fibrillation potentials of the bilateral first dorsal interosseous with reduced recruitment and prolonged motor unit potential duration on the left and no motor unit potentials noted on the right.  2.  Increased insertional activity positives and fibrillation potentials of the right APB with polyphasic units  3.  Increased insertional activity with positive waves and fibrillation potentials bilateral EIP.  4.  Increased insertional activity left FCU with positive waves and fibrillation potentials  reduced recruitment.    5. All proximal muscles tested in the bilateral upper extremities normal needle EMG.    Interpretation: Abnormal study: There is electrodiagnostic evidence of:    1.  This electrodiagnostic study is difficult to interpret.  There are multiple diffuse findings including diffusely low amplitude of the upper extremity sensory nerve action potentials with essentially normal peak latencies and denervation of the distal muscles tested.  These findings can be related to single process resulting in distal axon loss and denervation through the upper extremities such as a predominantly axonal peripheral polyneuropathy (although clinically, this would affect his lower extremities as well and at this time, his lower extremities do not appear to be affected on exam) or he may have a superimposed process resulting in denervation of the distal upper extremity muscles (along the C8/T1 myotomes) such as a C8 and/or T1 radiculopathy versus lower trunk brachial plexopathy.      2.  Right median neuropathy at the wrist consistent with entrapment in the carpal tunnel, severe.    3.  Left median neuropathy at the wrist consistent with entrapment in the carpal tunnel, mild in severity.    4.  There is no convincing electrodiagnostic evidence of superimposed ulnar neuropathy at the elbows bilaterally.      Note: Given the diffuse nature of his symptoms with asymmetric weakness, consideration should be given for a right brachial plexus MRI and referral to neurology for more comprehensive neurologic workup.       The testing was completed in its entirety by the physician.       It was our pleasure caring for your patient today, if there any questions or concerns please do not hesitate to contact us.

## 2024-03-15 NOTE — PATIENT INSTRUCTIONS
Thank you for choosing the [facility] for your EMG testing.    The ordering provider will receive your final EMG results within the next few days.  Please follow up with your provider for the results and further treatment recommendations.

## 2024-03-15 NOTE — LETTER
3/15/2024         RE: Rasheed Zimmerman  [address]  [facility] [address]        Dear Colleague,    Thank you for referring your patient, Rasheed Zimmerman, to the [facility]. Please see a copy of my visit note below.    [facility]  [address]  [place] [address]  Office: [phone] Fax: [phone]    Electromyography and Nerve Conduction Study Report        Indication: Patient presents at the request Dr. Doc Castillo for a bilateral upper extremity EMG.  He has approximate 1 year history of progressive weakness and paresthesias in the bilateral upper extremities right greater than left.Most of the paresthesias is between digits 2 and 4.  Weakness of wrist extension  and intrinsic muscles of the hands.  He is right-handed.  On exam, he has decreased sensation to light touch right digit 5.  He has 1+ bicep, tricep brachioradialis reflexes bilaterally with negative Héctor's.  5 -/5 strength right shoulder abductor 5/5 on the left, 5/5 bilateral elbow flexors and extensors, 2/5 right wrist extensors and finger extensors, 4/5 on the left.  1/5 right interosseous 5/5 on the left.  Atrophy noted of the right intrinsic muscles of the hand.  Predominantly APB and FDI.  He has normal strength of the ankles and dorsiflexors and plantar flexors in the bilateral lower extremities and reports no symptoms in the lower extremities.        Pt Exam Discussion (Communication Barriers):  Electromyography and nerve conduction testing, including associated discomfort, risks, benefits, and alternatives was discussed with the patient prior to the procedure.  No learning/ communication barriers; patient verbalized understanding of procedure.  Informed consent was obtained.           Pt Assessment:  Testing was successfully completed; patient tolerated testing well.       Blood Thinners: None Skin Temperature: Warmed 31.5                      EMG/NCS  results:     Nerve Conduction Studies  Motor Sites      Segment Distal Latency Neg. Amp CV F-Latency F-Estimate Comment   Site  (ms) (mV) (m/s) (ms) (ms)    Left Median (APB) Motor   Wrist Wrist-APB *4.9 5.1       Elbow Elbow-Wrist 11.0 4.3 *40      Right Median (APB) Motor   Wrist Wrist-APB *8.7 0.52       Elbow Elbow-Wrist 15.5 *0.25 *37      Left Ulnar (ADM) Motor   Wrist  2.9 6.1       Bel Elbow Bel Elbow-Wrist 8.1 4.8 50      Ab Elbow Ab Elbow-Wrist 10.3 4.8 51      Right Ulnar (ADM) Motor   Wrist  3.4 6.9       Bel Elbow Bel Elbow-Wrist 8.3 6.0 50      Ab Elbow Ab Elbow-Wrist 10.5 5.6 51        Sensory Sites      Onset Lat Peak Lat Amp CV Comment   Site (ms) (ms) ( V) (m/s)    Left Median Anti Sensory   Wrist-Dig II 3.2 *3.9 *12 41    Right Median Anti Sensory   Wrist-Dig II 4.6 *5.3 *6 28    Left Median-Ulnar Palmar Sensory        Median   Palm-Wrist 1.78 2.3 47 45         Ulnar   Palm-Wrist 1.55 1.95 *11 52    Right Median-Ulnar Palmar Sensory        Median   Palm-Wrist *NR *NR *NR *NR         Ulnar   Palm-Wrist 1.80 2.2 *6 44    Left Radial Sensory   Forearm-Wrist 2.0 2.6 *12 50    Right Radial Sensory   Forearm-Wrist 2.1 2.7 *14 48    Left Ulnar Anti Sensory   Wrist-Dig V 2.5 *3.1 14 44    Right Ulnar Anti Sensory   Wrist-Dig V 2.9 *3.5 *10 38        NCS Waveforms:    Motor                Sensory                             Electromyography     Side Muscle Nerve Root Ins Act Fibs Psw Fasc Recrt Dur Amp Poly Comment   Right Deltoid Axillary C5-6 Nml Nml Nml Nml Nml Nml Nml 0    Right Triceps Radial C6-7-8 Nml Nml Nml Nml Nml Nml Nml 0    Right PronatorTeres Median C6-7 Nml Nml Nml Nml Nml Nml Nml 0    Right 1stDorInt Ulnar C8-T1 *Incr *1+ *1+ Nml      No activation   Right Abd Poll Brev Median C8-T1 *Incr *1+ *1+ Nml Nml *>12ms Nml *2+    Right Abd Dig Min2 Ulnar C8-T1 Nml Nml Nml Nml Nml Nml Nml 0    Right FlexPolLong2 Median (Ant Int) C7-8 Nml Nml Nml Nml Nml Nml Nml 0    Right Ext  Indicis2 Radial (Post Int) C7-8 *Incr *1+ *1+ Nml *Reduced *>12ms *Incr 0    Right FlexCarpiUln2 Ulnar C8,T1 Nml Nml Nml Nml Nml Nml Nml 0    Left Deltoid Axillary C5-6 Nml Nml Nml Nml Nml Nml Nml 0    Left Triceps Radial C6-7-8 Nml Nml Nml Nml Nml Nml Nml 0    Left PronatorTeres Median C6-7 Nml Nml Nml Nml Nml Nml Nml 0    Left 1stDorInt Ulnar C8-T1 *Incr *1+ *1+ Nml *Reduced *>12ms *Incr 0    Left Abd Poll Brev Median C8-T1 Nml Nml Nml Nml Nml Nml Nml 0    Left Abd Dig Min2 Ulnar C8-T1 Nml Nml Nml Nml Nml Nml Nml 0    Left FlexPolLong2 Median (Ant Int) C7-8 Nml Nml Nml Nml Nml Nml Nml 0    Left Ext Indicis2 Radial (Post Int) C7-8 *Incr *1+ *1+ Nml *Reduced Nml Nml 0    Left FlexCarpiUln2 Ulnar C8,T1 *Incr *1+ *1+ Nml Nml Nml Nml 0          Comment NCS: Abnormal study  1.  Prolonged latency right greater than left median SNAPs with decreased amplitudes.  2.  Prolonged latency right borderline left ulnar SNAPs with borderline amplitudes.  3.  Normal peak latencies of the bilateral radial SNAPs with mildly reduced amplitudes.  4.  Absent right median transcarpal study with borderline peak latency on the left with reduced amplitude on the left.  5.  Normal peak latencies with reduced amplitudes of the ulnar transcarpal studies.  6.  Significantly prolonged latency with severely reduced amplitude right median CMAP  7.  Mildly prolonged latency with normal amplitude and slowed conduction velocity of the left median CMAP.  8.  Diffuse borderline/low amplitude with normal latencies of the bilateral ulnar CMAP.  Borderline conduction velocities without amplitude drop or slowing across the elbow.      Comment EMG: Abnormal study  1.  Increased insertional activity with positive waves and fibrillation potentials of the bilateral first dorsal interosseous with reduced recruitment and prolonged motor unit potential duration on the left and no motor unit potentials noted on the right.  2.  Increased insertional activity  positives and fibrillation potentials of the right APB with polyphasic units  3.  Increased insertional activity with positive waves and fibrillation potentials bilateral EIP.  4.  Increased insertional activity left FCU with positive waves and fibrillation potentials reduced recruitment.    5. All proximal muscles tested in the bilateral upper extremities normal needle EMG.    Interpretation: Abnormal study: There is electrodiagnostic evidence of:    1.  This electrodiagnostic study is difficult to interpret.  There are multiple diffuse findings including diffusely low amplitude of the upper extremity sensory nerve action potentials with essentially normal peak latencies and denervation of the distal muscles tested.  These findings can be related to single process resulting in distal axon loss and denervation through the upper extremities such as a predominantly axonal peripheral polyneuropathy (although clinically, this would affect his lower extremities as well and at this time, his lower extremities do not appear to be affected on exam) or he may have a superimposed process resulting in denervation of the distal upper extremity muscles (along the C8/T1 myotomes) such as a C8 and/or T1 radiculopathy versus lower trunk brachial plexopathy.      2.  Right median neuropathy at the wrist consistent with entrapment in the carpal tunnel, severe.    3.  Left median neuropathy at the wrist consistent with entrapment in the carpal tunnel, mild in severity.    4.  There is no convincing electrodiagnostic evidence of superimposed ulnar neuropathy at the elbows bilaterally.      Note: Given the diffuse nature of his symptoms with asymmetric weakness, consideration should be given for a right brachial plexus MRI and referral to neurology for more comprehensive neurologic workup.       The testing was completed in its entirety by the physician.       It was our pleasure caring for your patient today, if there any questions or  concerns please do not hesitate to contact us.    Again, thank you for allowing me to participate in the care of your patient.        Sincerely,        Kofi Ying, DO

## 2024-03-20 ENCOUNTER — THERAPY VISIT (OUTPATIENT)
Dept: PHYSICAL THERAPY | Facility: CLINIC | Age: 69
End: 2024-03-20
Attending: ORTHOPAEDIC SURGERY
Payer: COMMERCIAL

## 2024-03-20 DIAGNOSIS — G89.29 CHRONIC PAIN OF RIGHT KNEE: ICD-10-CM

## 2024-03-20 DIAGNOSIS — M25.561 CHRONIC PAIN OF RIGHT KNEE: ICD-10-CM

## 2024-03-20 DIAGNOSIS — M54.2 CERVICALGIA: Primary | ICD-10-CM

## 2024-03-20 PROCEDURE — 97140 MANUAL THERAPY 1/> REGIONS: CPT | Mod: GP

## 2024-03-20 PROCEDURE — 97110 THERAPEUTIC EXERCISES: CPT | Mod: GP

## 2024-03-22 ENCOUNTER — TRANSFERRED RECORDS (OUTPATIENT)
Dept: HEALTH INFORMATION MANAGEMENT | Facility: CLINIC | Age: 69
End: 2024-03-22
Payer: COMMERCIAL

## 2024-03-23 ENCOUNTER — LAB (OUTPATIENT)
Dept: LAB | Facility: CLINIC | Age: 69
End: 2024-03-23
Payer: COMMERCIAL

## 2024-03-23 DIAGNOSIS — H49.03: ICD-10-CM

## 2024-03-23 DIAGNOSIS — R29.898 WEAKNESS OF BOTH ARMS: Primary | ICD-10-CM

## 2024-03-23 LAB
ALBUMIN SERPL BCG-MCNC: 4.4 G/DL (ref 3.5–5.2)
ALP SERPL-CCNC: 74 U/L (ref 40–150)
ALT SERPL W P-5'-P-CCNC: 30 U/L (ref 0–70)
ANION GAP SERPL CALCULATED.3IONS-SCNC: 11 MMOL/L (ref 7–15)
AST SERPL W P-5'-P-CCNC: 25 U/L (ref 0–45)
BASOPHILS # BLD AUTO: 0.1 10E3/UL (ref 0–0.2)
BASOPHILS NFR BLD AUTO: 1 %
BILIRUB SERPL-MCNC: 0.6 MG/DL
BUN SERPL-MCNC: 16.1 MG/DL (ref 8–23)
CALCIUM SERPL-MCNC: 9.4 MG/DL (ref 8.8–10.2)
CHLORIDE SERPL-SCNC: 105 MMOL/L (ref 98–107)
CK SERPL-CCNC: 216 U/L (ref 39–308)
CREAT SERPL-MCNC: 0.75 MG/DL (ref 0.67–1.17)
CRP SERPL-MCNC: <3 MG/L
DEPRECATED HCO3 PLAS-SCNC: 22 MMOL/L (ref 22–29)
EGFRCR SERPLBLD CKD-EPI 2021: >90 ML/MIN/1.73M2
EOSINOPHIL # BLD AUTO: 0.3 10E3/UL (ref 0–0.7)
EOSINOPHIL NFR BLD AUTO: 5 %
ERYTHROCYTE [DISTWIDTH] IN BLOOD BY AUTOMATED COUNT: 13.5 % (ref 10–15)
GLUCOSE SERPL-MCNC: 138 MG/DL (ref 70–99)
HCT VFR BLD AUTO: 48.7 % (ref 40–53)
HGB BLD-MCNC: 16.9 G/DL (ref 13.3–17.7)
IMM GRANULOCYTES # BLD: 0 10E3/UL
IMM GRANULOCYTES NFR BLD: 0 %
LYMPHOCYTES # BLD AUTO: 1.3 10E3/UL (ref 0.8–5.3)
LYMPHOCYTES NFR BLD AUTO: 19 %
MCH RBC QN AUTO: 29.9 PG (ref 26.5–33)
MCHC RBC AUTO-ENTMCNC: 34.7 G/DL (ref 31.5–36.5)
MCV RBC AUTO: 86 FL (ref 78–100)
MONOCYTES # BLD AUTO: 0.7 10E3/UL (ref 0–1.3)
MONOCYTES NFR BLD AUTO: 10 %
NEUTROPHILS # BLD AUTO: 4.2 10E3/UL (ref 1.6–8.3)
NEUTROPHILS NFR BLD AUTO: 65 %
PLATELET # BLD AUTO: 210 10E3/UL (ref 150–450)
POTASSIUM SERPL-SCNC: 3.9 MMOL/L (ref 3.4–5.3)
PROT SERPL-MCNC: 6.8 G/DL (ref 6.4–8.3)
RBC # BLD AUTO: 5.65 10E6/UL (ref 4.4–5.9)
SODIUM SERPL-SCNC: 138 MMOL/L (ref 135–145)
T4 FREE SERPL-MCNC: 1.42 NG/DL (ref 0.9–1.7)
TSH SERPL DL<=0.005 MIU/L-ACNC: 1.02 UIU/ML (ref 0.3–4.2)
VIT B12 SERPL-MCNC: 464 PG/ML (ref 232–1245)
WBC # BLD AUTO: 6.5 10E3/UL (ref 4–11)

## 2024-03-23 PROCEDURE — 80053 COMPREHEN METABOLIC PANEL: CPT

## 2024-03-23 PROCEDURE — 82550 ASSAY OF CK (CPK): CPT

## 2024-03-23 PROCEDURE — 86038 ANTINUCLEAR ANTIBODIES: CPT

## 2024-03-23 PROCEDURE — 86140 C-REACTIVE PROTEIN: CPT

## 2024-03-23 PROCEDURE — 36415 COLL VENOUS BLD VENIPUNCTURE: CPT

## 2024-03-23 PROCEDURE — 85025 COMPLETE CBC W/AUTO DIFF WBC: CPT

## 2024-03-23 PROCEDURE — 84443 ASSAY THYROID STIM HORMONE: CPT

## 2024-03-23 PROCEDURE — 86618 LYME DISEASE ANTIBODY: CPT

## 2024-03-23 PROCEDURE — 82607 VITAMIN B-12: CPT

## 2024-03-25 LAB
ANA SER QL IF: NEGATIVE
B BURGDOR IGG+IGM SER QL: 0.14

## 2024-03-27 ENCOUNTER — THERAPY VISIT (OUTPATIENT)
Dept: PHYSICAL THERAPY | Facility: CLINIC | Age: 69
End: 2024-03-27
Payer: COMMERCIAL

## 2024-03-27 DIAGNOSIS — M54.2 CERVICALGIA: Primary | ICD-10-CM

## 2024-03-27 DIAGNOSIS — G89.29 CHRONIC PAIN OF RIGHT KNEE: ICD-10-CM

## 2024-03-27 DIAGNOSIS — M25.561 CHRONIC PAIN OF RIGHT KNEE: ICD-10-CM

## 2024-03-27 PROCEDURE — 97110 THERAPEUTIC EXERCISES: CPT | Mod: GP

## 2024-03-27 PROCEDURE — 97012 MECHANICAL TRACTION THERAPY: CPT | Mod: GP

## 2024-03-29 ENCOUNTER — TRANSFERRED RECORDS (OUTPATIENT)
Dept: HEALTH INFORMATION MANAGEMENT | Facility: CLINIC | Age: 69
End: 2024-03-29

## 2024-03-29 ENCOUNTER — ANCILLARY PROCEDURE (OUTPATIENT)
Dept: MRI IMAGING | Facility: CLINIC | Age: 69
End: 2024-03-29
Attending: ORTHOPAEDIC SURGERY
Payer: COMMERCIAL

## 2024-03-29 DIAGNOSIS — G56.22 CUBITAL TUNNEL SYNDROME, LEFT: ICD-10-CM

## 2024-03-29 DIAGNOSIS — G56.21 CUBITAL TUNNEL SYNDROME ON RIGHT: ICD-10-CM

## 2024-03-29 DIAGNOSIS — M54.2 CERVICALGIA: ICD-10-CM

## 2024-03-29 PROCEDURE — A9585 GADOBUTROL INJECTION: HCPCS | Performed by: RADIOLOGY

## 2024-03-29 PROCEDURE — 72156 MRI NECK SPINE W/O & W/DYE: CPT | Mod: TC | Performed by: RADIOLOGY

## 2024-03-29 RX ORDER — GADOBUTROL 604.72 MG/ML
10 INJECTION INTRAVENOUS ONCE
Status: COMPLETED | OUTPATIENT
Start: 2024-03-29 | End: 2024-03-29

## 2024-03-29 RX ADMIN — GADOBUTROL 9 ML: 604.72 INJECTION INTRAVENOUS at 08:50

## 2024-04-04 ENCOUNTER — THERAPY VISIT (OUTPATIENT)
Dept: PHYSICAL THERAPY | Facility: CLINIC | Age: 69
End: 2024-04-04
Payer: COMMERCIAL

## 2024-04-04 DIAGNOSIS — M54.2 CERVICALGIA: ICD-10-CM

## 2024-04-04 DIAGNOSIS — G89.29 CHRONIC PAIN OF RIGHT KNEE: Primary | ICD-10-CM

## 2024-04-04 DIAGNOSIS — M25.561 CHRONIC PAIN OF RIGHT KNEE: Primary | ICD-10-CM

## 2024-04-04 PROCEDURE — 97110 THERAPEUTIC EXERCISES: CPT | Mod: GP

## 2024-04-11 ENCOUNTER — TRANSFERRED RECORDS (OUTPATIENT)
Dept: HEALTH INFORMATION MANAGEMENT | Facility: CLINIC | Age: 69
End: 2024-04-11
Payer: COMMERCIAL

## 2024-04-12 ENCOUNTER — MEDICAL CORRESPONDENCE (OUTPATIENT)
Dept: HEALTH INFORMATION MANAGEMENT | Facility: CLINIC | Age: 69
End: 2024-04-12
Payer: COMMERCIAL

## 2024-04-15 DIAGNOSIS — E78.5 HYPERLIPIDEMIA LDL GOAL <130: ICD-10-CM

## 2024-04-15 RX ORDER — SIMVASTATIN 40 MG
40 TABLET ORAL AT BEDTIME
Qty: 90 TABLET | Refills: 0 | Status: SHIPPED | OUTPATIENT
Start: 2024-04-15

## 2024-04-16 ENCOUNTER — TRANSCRIBE ORDERS (OUTPATIENT)
Dept: OTHER | Age: 69
End: 2024-04-16

## 2024-04-16 DIAGNOSIS — R29.898 WEAKNESS OF BOTH ARMS: Primary | ICD-10-CM

## 2024-06-14 ENCOUNTER — MYC MEDICAL ADVICE (OUTPATIENT)
Dept: UROLOGY | Facility: CLINIC | Age: 69
End: 2024-06-14
Payer: COMMERCIAL

## 2024-06-14 NOTE — TELEPHONE ENCOUNTER
"SUBJECTIVE/OBJECTIVE:                                                    Refill request receive for:  Alfuzosin ER 10mg Tab    Last refill per Epic: 5/19/23  Last refill per fax: NA  Date of LOV r/t Medication: 5/19/23  Future appt scheduled? No   Date faxed to clinic: 6/13/24    RECENT LABS/VITALS                                                      Recent Labs   Lab Test 06/02/23  0952 04/12/22  1416   CHOL 214* 149   HDL 44 53   * 79   TRIG 101 87     Lab Results   Component Value Date    AST 25 03/23/2024    AST 16 01/14/2019     Lab Results   Component Value Date    ALT 30 03/23/2024    ALT 29 01/14/2019     No results found for: \"A1C\"  Creatinine   Date Value Ref Range Status   03/23/2024 0.75 0.67 - 1.17 mg/dL Final   03/12/2021 0.95 0.66 - 1.25 mg/dL Final   ]  Potassium   Date Value Ref Range Status   03/23/2024 3.9 3.4 - 5.3 mmol/L Final   04/12/2022 4.2 3.4 - 5.3 mmol/L Final   03/12/2021 3.9 3.4 - 5.3 mmol/L Final   ]  TSH   Date Value Ref Range Status   03/23/2024 1.02 0.30 - 4.20 uIU/mL Final   04/12/2022 0.64 0.40 - 4.00 mU/L Final   03/12/2021 0.88 0.40 - 4.00 mU/L Final   12/02/2011 1.05 0.4 - 5.0 mU/L Final     BP Readings from Last 4 Encounters:   01/25/24 (!) 144/89   01/25/24 (!) 145/88   06/02/23 137/86   04/12/22 134/86       PLAN:                                                      Medication refilled per protocol: not yet, pt needs follow up appt scheduled.    Dianelys Carey RN                     "

## 2024-06-14 NOTE — TELEPHONE ENCOUNTER
Called pt to discuss medication refill request from [facility] in [place].  Pt needs another appointment prior to refilling Alfuzosin ER as it has been over one year since we saw him.     Reviewed last office visit notes from Kate 5/19/23:    67 year old male with LUTS characterized by nocturia, slow stream, and urinary hesitancy, improved with alfuzosin. Will continue with the same.      -Continue alfuzosin 10 mg once daily. Refilled.  -Continue to limit fluids before bed.  -Due for annual PSA which he plans to have done as part of annual physical in early June.  -If PCP willing to take over refilling alfuzosin, he can follow up with urology as needed.    No answer, left generic voicemail for pt to return call to clinic.      Dianelys Carey RN

## 2024-06-19 NOTE — TELEPHONE ENCOUNTER
6/19/24 2nd attempt - left generic voicemail for patient to return call to clinic at [phone], also informed patient on voicemail that a [employer] message was sent as well.     MyChart message and two voicemails to contact patient but no success.     Closing encounter  Marcia Jones MA on 6/19/2024 at 11:38 AM

## 2024-07-03 ENCOUNTER — TELEPHONE (OUTPATIENT)
Dept: UROLOGY | Facility: CLINIC | Age: 69
End: 2024-07-03
Payer: COMMERCIAL

## 2024-07-03 NOTE — TELEPHONE ENCOUNTER
7/3 Called patient and left voicemail. Provided patient with [phone] as a call back number. Patient needs office visit scheduled for medication refill. Spot on hold for 7/18/2024 at 1:15 PM for either and in-person or virtual video visit (needs camera/speaker access and in the state of [place])     Johanna richard Complex   Dermatology, Surgery, Urology  [facility]

## 2024-07-03 NOTE — TELEPHONE ENCOUNTER
Faxed refill request from: [facility] [address] [place] [address]  Medication request: alfuzosin ER (UROXATRAL) 10 MG 24 hr tablet   Sig: - Route: Take 1 tablet (10 mg) by mouth daily - Oral   Last filled: 5/19/23  Last Qty: 30 11 refills  Pt's last office visit: 5/19/23  Next scheduled office visit: none    Patient is overdue for his one year office visit.     Sera Workman LPN on 7/3/2024 at 9:47 AM

## 2024-07-08 NOTE — TELEPHONE ENCOUNTER
7/8 Spoke with patient. He stated he now has Medicare and VA coverage and he has a physical set up soon- he will talk to the providers there and see if he can get his prescription refilled. If he needs to schedule through [facility]- patient stated that he will call the clinic to schedule. Closing encounter.     Johanna richard Complex   Dermatology, Surgery, Urology  [facility] [place]

## 2024-08-10 ENCOUNTER — HEALTH MAINTENANCE LETTER (OUTPATIENT)
Age: 69
End: 2024-08-10

## 2024-10-21 NOTE — PATIENT INSTRUCTIONS
Patient Education     Eating Heart-Healthy Food: Using the DASH Plan    Eating for your heart doesn t have to be hard or boring. You just need to know how to make healthier choices. The DASH eating plan has been developed to help you do just that. DASH stands for Dietary Approaches to Stop Hypertension. It is a plan that has been proven to be healthier for your heart and to lower your risk for high blood pressure. It can also help lower your risk for cancer, heart disease, osteoporosis, and diabetes.  Choosing from each food group  Choose foods from each of the food groups below each day. Try to get the recommended number of servings for each food group. The serving numbers are based on a diet of 2,000 calories a day. Talk with your healthcare provider if you re not sure about your calorie needs. Along with getting the correct servings, the DASH plan also advises less than 2,300 mg of salt (sodium) per day. Lowering sodium intake to 1,500 mg per day lowers blood pressure even more. (There's about 2,300 mg of sodium in 1 teaspoon of salt.)      Grains  Servings: 6 to 8 a day  A serving is:    1 slice bread    1 ounce dry cereal    Half a cup cooked rice, pasta or cereal  Best choices: Whole grains and any grains high in fiber. Vegetables  Servings: 4 to 5 a day  A serving is:    1 cup raw leafy vegetable    Half a cup cut-up raw or cooked vegetable    Half a cup vegetable juice  Best choices: Fresh or frozen vegetables prepared without added salt or fat.   Fruits  Servings: 4 to 5 a day  A serving is:    1 medium fruit    One-quarter cup dried fruit    Half a cup fresh, frozen, or canned fruit    Half a cup of 100% fruit juices  Best choices: A variety of fresh fruits of different colors. Whole fruits are a better choice than fruit juices. Low-fat or fat-free dairy  Servings: 2 to 3 a day  A serving is:    1 cup milk    1 cup yogurt    One and a half ounces cheese  Best choices: Skim or 1% milk, low-fat or fat-free  Continue Celexa  Supportive care    yogurt or buttermilk, and low-fat cheeses.         Lean meats, poultry, fish  Servings: 6 or fewer a day  A serving is:    1 ounce cooked meats, poultry, or fish    1 egg  Best choices: Lean poultry and fish. Trim away visible fat. Broil, grill, roast, or boil instead of frying. Remove skin from poultry before eating. Limit how much red meat you eat.  Nuts, seeds, beans  Servings: 4 to 5 a week  A serving is:    One-third cup nuts (one and a half ounces)    2 tablespoons nut butter or seeds    Half a cup cooked dry beans or legumes  Best choices: Dry roasted nuts with no salt added, lentils, kidney beans, garbanzo beans, and whole choudhary beans.   Fats and oils  Servings: 2 to 3 a day  A serving is:    1 teaspoon vegetable oil    1 teaspoon soft margarine    1 tablespoon mayonnaise    2 tablespoons salad dressing  Best choices: Nut and vegetable oils (nontropical vegetable oils), such as olive and canola oil. Sweets  Servings: 5 a week or fewer  A serving is:    1 tablespoon sugar, maple syrup, or honey    1 tablespoon jam or jelly    1 half-ounce jelly beans (about 15)    1 cup lemonade  Best choices: Dried fruit can be a satisfying sweet. Choose low-fat sweets. And watch your serving sizes!      For more on the DASH eating plan, visit:  www.nhlbi.nih.gov/health/health-topics/topics/dash   Sean last reviewed this educational content on 7/1/2019 2000-2020 [employer]. All rights reserved. This information is not intended as a substitute for professional medical care. Always follow your healthcare professional's instructions.

## 2025-01-09 DIAGNOSIS — E78.5 HYPERLIPIDEMIA LDL GOAL <130: ICD-10-CM

## 2025-01-09 RX ORDER — SIMVASTATIN 40 MG
40 TABLET ORAL AT BEDTIME
Qty: 90 TABLET | Refills: 0 | OUTPATIENT
Start: 2025-01-09

## 2025-01-09 NOTE — TELEPHONE ENCOUNTER
Clinic RN: Please investigate patient's chart or contact patient if the information cannot be found because patient should have run out of this medication on 7/15/24. Confirm patient is taking this medication as prescribed. Document findings and route refill encounter to provider for approval or denial.  Celsa Snell RN, BSN  [facility]

## 2025-01-09 NOTE — TELEPHONE ENCOUNTER
Called and spoke with pt.    He states [facility] so isn't [facility] why Dr. Jovel got a request for this refill.     Will check with his wife but says to cancel this for now.    Ernestine Fraga, RN, BSN  [facility]

## 2025-02-12 ENCOUNTER — PATIENT OUTREACH (OUTPATIENT)
Dept: GASTROENTEROLOGY | Facility: CLINIC | Age: 70
End: 2025-02-12
Payer: COMMERCIAL

## 2025-04-07 NOTE — PATIENT INSTRUCTIONS
UROLOGY CLINIC VISIT PATIENT INSTRUCTIONS    Start taking alfuzosin 10 mg once daily in the evening to help with urinary symptoms likely related to your prostate.    Limit your fluid intake before bed and make sure to empty your bladder before going to bed.    Follow up in 6-8 weeks to recheck.    If you have any issues, questions or concerns in the meantime, do not hesitate to contact us at [phone] or via [employer].     It was a pleasure meeting with you today.  Thank you for allowing me and my team the privilege of caring for you today.  YOU are the reason we are here, and I truly hope we provided you with the excellent service you deserve.  Please let us know if there is anything else we can do for you so that we can be sure you are leaving completely satisfied with your care experience.      
07-Apr-2025 22:52

## 2025-08-06 ENCOUNTER — PATIENT OUTREACH (OUTPATIENT)
Dept: GASTROENTEROLOGY | Facility: CLINIC | Age: 70
End: 2025-08-06
Payer: COMMERCIAL

## 2025-08-06 DIAGNOSIS — Z12.11 SPECIAL SCREENING FOR MALIGNANT NEOPLASMS, COLON: Primary | ICD-10-CM

## 2025-08-16 ENCOUNTER — HEALTH MAINTENANCE LETTER (OUTPATIENT)
Age: 70
End: 2025-08-16

## (undated) DEVICE — KIT ENDO FIRST STEP DISINFECTANT 200ML W/POUCH EP-4

## (undated) DEVICE — PREP CHLORAPREP 26ML TINTED ORANGE  260815

## (undated) DEVICE — PAD CHUX UNDERPAD 23X24" 7136

## (undated) RX ORDER — LIDOCAINE HYDROCHLORIDE 10 MG/ML
INJECTION, SOLUTION EPIDURAL; INFILTRATION; INTRACAUDAL; PERINEURAL
Status: DISPENSED
Start: 2024-01-25

## (undated) RX ORDER — BETAMETHASONE SODIUM PHOSPHATE AND BETAMETHASONE ACETATE 3; 3 MG/ML; MG/ML
INJECTION, SUSPENSION INTRA-ARTICULAR; INTRALESIONAL; INTRAMUSCULAR; SOFT TISSUE
Status: DISPENSED
Start: 2024-01-25

## (undated) RX ORDER — FENTANYL CITRATE 50 UG/ML
INJECTION, SOLUTION INTRAMUSCULAR; INTRAVENOUS
Status: DISPENSED
Start: 2021-05-13